# Patient Record
Sex: FEMALE | Race: WHITE
[De-identification: names, ages, dates, MRNs, and addresses within clinical notes are randomized per-mention and may not be internally consistent; named-entity substitution may affect disease eponyms.]

---

## 2020-03-26 ENCOUNTER — HOSPITAL ENCOUNTER (INPATIENT)
Dept: HOSPITAL 41 - JD.ED | Age: 80
LOS: 14 days | Discharge: NURSING, REHAB OR LONG-TERM CARE | DRG: 389 | End: 2020-04-09
Attending: INTERNAL MEDICINE | Admitting: INTERNAL MEDICINE
Payer: MEDICARE

## 2020-03-26 DIAGNOSIS — E66.9: ICD-10-CM

## 2020-03-26 DIAGNOSIS — Z79.899: ICD-10-CM

## 2020-03-26 DIAGNOSIS — B96.1: ICD-10-CM

## 2020-03-26 DIAGNOSIS — E86.0: ICD-10-CM

## 2020-03-26 DIAGNOSIS — N17.9: ICD-10-CM

## 2020-03-26 DIAGNOSIS — K56.609: Primary | ICD-10-CM

## 2020-03-26 DIAGNOSIS — Z79.01: ICD-10-CM

## 2020-03-26 DIAGNOSIS — Z86.718: ICD-10-CM

## 2020-03-26 DIAGNOSIS — E78.5: ICD-10-CM

## 2020-03-26 DIAGNOSIS — R31.9: ICD-10-CM

## 2020-03-26 DIAGNOSIS — K92.1: ICD-10-CM

## 2020-03-26 DIAGNOSIS — E87.2: ICD-10-CM

## 2020-03-26 DIAGNOSIS — R11.2: ICD-10-CM

## 2020-03-26 DIAGNOSIS — Z91.013: ICD-10-CM

## 2020-03-26 DIAGNOSIS — K59.09: ICD-10-CM

## 2020-03-26 DIAGNOSIS — E78.00: ICD-10-CM

## 2020-03-26 DIAGNOSIS — C56.9: ICD-10-CM

## 2020-03-26 DIAGNOSIS — K21.9: ICD-10-CM

## 2020-03-26 DIAGNOSIS — Z51.5: ICD-10-CM

## 2020-03-26 DIAGNOSIS — C18.9: ICD-10-CM

## 2020-03-26 DIAGNOSIS — C19: ICD-10-CM

## 2020-03-26 DIAGNOSIS — Z85.43: ICD-10-CM

## 2020-03-26 DIAGNOSIS — N39.0: ICD-10-CM

## 2020-03-26 DIAGNOSIS — Z90.722: ICD-10-CM

## 2020-03-26 DIAGNOSIS — H91.90: ICD-10-CM

## 2020-03-26 DIAGNOSIS — E86.9: ICD-10-CM

## 2020-03-26 PROCEDURE — 87186 SC STD MICRODIL/AGAR DIL: CPT

## 2020-03-26 PROCEDURE — 81001 URINALYSIS AUTO W/SCOPE: CPT

## 2020-03-26 PROCEDURE — 96365 THER/PROPH/DIAG IV INF INIT: CPT

## 2020-03-26 PROCEDURE — 85025 COMPLETE CBC W/AUTO DIFF WBC: CPT

## 2020-03-26 PROCEDURE — 87086 URINE CULTURE/COLONY COUNT: CPT

## 2020-03-26 PROCEDURE — 71046 X-RAY EXAM CHEST 2 VIEWS: CPT

## 2020-03-26 PROCEDURE — 99285 EMERGENCY DEPT VISIT HI MDM: CPT

## 2020-03-26 PROCEDURE — 86140 C-REACTIVE PROTEIN: CPT

## 2020-03-26 PROCEDURE — 84484 ASSAY OF TROPONIN QUANT: CPT

## 2020-03-26 PROCEDURE — 83605 ASSAY OF LACTIC ACID: CPT

## 2020-03-26 PROCEDURE — 87040 BLOOD CULTURE FOR BACTERIA: CPT

## 2020-03-26 PROCEDURE — 36415 COLL VENOUS BLD VENIPUNCTURE: CPT

## 2020-03-26 PROCEDURE — 87088 URINE BACTERIA CULTURE: CPT

## 2020-03-26 PROCEDURE — 80053 COMPREHEN METABOLIC PANEL: CPT

## 2020-03-26 PROCEDURE — 96375 TX/PRO/DX INJ NEW DRUG ADDON: CPT

## 2020-03-26 PROCEDURE — 96361 HYDRATE IV INFUSION ADD-ON: CPT

## 2020-03-26 PROCEDURE — 96376 TX/PRO/DX INJ SAME DRUG ADON: CPT

## 2020-03-26 PROCEDURE — 87077 CULTURE AEROBIC IDENTIFY: CPT

## 2020-03-26 RX ADMIN — SCOPALAMINE SCH MG: 1 PATCH, EXTENDED RELEASE TRANSDERMAL at 20:45

## 2020-03-26 NOTE — EDM.PDOC
ED HPI GENERAL MEDICAL PROBLEM





- General


Chief Complaint: Abdominal Pain


Stated Complaint: DEHYDRATED


Time Seen by Provider: 03/26/20 12:00


Source of Information: Reports: Patient


History Limitations: Reports: No Limitations





- History of Present Illness


INITIAL COMMENTS - FREE TEXT/NARRATIVE: 





Patient is an 79-year-old female who presents with her daughter with complaints 

of nausea and vomiting.  Patient was hospitalized and had surgery to remove her 

uterus and colon lesions at [facility] on 18 March.  Per patient's report, the 

surgery was unsuccessful.  She was discharged on 20 March.  Daughter states 

that she had nausea and vomiting for couple days after surgery.  She was okay 

for 1 day after getting home and then the nausea and vomiting returned on 

Sunday.  She has not been able to keep her medications, food, or liquids down.  

She often has nausea and vomiting with anesthesia, however this is lasting 

longer than it normally does.  She was discharged with Zofran which she took 

around 9:00 this morning.  She complains of increased weakness, dizziness, and 

lightheadedness.  She has had regular bowel movement since surgery.  She does 

also have blood in her stool, however she states this has been an ongoing issue 

for her due to her colorectal cancer.  She states that the blood in her stool 

has not worsened in any way.  She does have mild lower abdominal pain in the 

area of the incision. Denies any fever, chills, or dysuria.


  ** Middle Abdomen


Pain Score (Numeric/FACES): 4





- Related Data


 Allergies











Allergy/AdvReac Type Severity Reaction Status Date / Time


 


shellfish derived Allergy  Rash Verified 03/26/20 11:44











Home Meds: 


 Home Meds





Acetaminophen 1,000 mg PO Q6HR PRN 03/26/20 [History]


Apixaban [Eliquis] 5 mg PO BID 03/26/20 [History]


Multivitamin [Daily Bianca] 1 each PO DAILY 03/26/20 [History]


Omeprazole 20 mg PO DAILY PRN 03/26/20 [History]


Sennosides/Docusate Sodium [Sennosides-Docusate Sodium] 1 each PO BID PRN 03/26/ 20 [History]


Simethicone 80 mg PO QID PRN 03/26/20 [History]


Simvastatin 5 mg PO BEDTIME 03/26/20 [History]


traMADol [Ultram] 50 mg PO Q6H PRN 03/26/20 [History]











Past Medical History


HEENT History: Reports: Hard of Hearing


Cardiovascular History: Reports: Blood Clots/VTE/DVT, High Cholesterol


Respiratory History: Reports: None


Gastrointestinal History: Reports: Chronic Constipation, GERD


OB/GYN History: Reports: Pregnancy


Musculoskeletal History: Reports: None


Neurological History: Reports: None


Psychiatric History: Reports: None


Endocrine/Metabolic History: Reports: Obesity/BMI 30+


Hematologic History: Reports: Anticoagulation Therapy, Blood Transfusion(s)


Immunologic History: Reports: None


Oncologic (Cancer) History: Reports: Colon, Ovarian


Dermatologic History: Reports: None





- Infectious Disease History


Infectious Disease History: Reports: None





- Past Surgical History


GI Surgical History: Reports: Colonoscopy, Other (See Below)


Other GI Surgeries/Procedures: Exploratory Laparotomy


Female  Surgical History: Reports: Other (See Below)


Other Female  Surgeries/Procedures: Ovaries Removed in 2007


Oncologic Surgical History: Reports: Biopsy of Breast





Social & Family History





- Tobacco Use


Smoking Status *Q: Never Smoker





- Caffeine Use


Caffeine Use: Reports: Coffee





- Recreational Drug Use


Recreational Drug Use: No





ED ROS GENERAL





- Review of Systems


Review Of Systems: See Below


Constitutional: Reports: Weakness, Decreased Appetite.  Denies: Fever, Chills


HEENT: Reports: No Symptoms


Respiratory: Reports: No Symptoms.  Denies: Shortness of Breath, Cough


Cardiovascular: Reports: Lightheadedness.  Denies: Chest Pain, Dyspnea on 

Exertion, Syncope


Endocrine: Reports: No Symptoms


GI/Abdominal: Reports: Nausea, Vomiting.  Denies: Abdominal Pain, Diarrhea


: Reports: Hematuria.  Denies: Dysuria, Flank Pain, Frequency


Musculoskeletal: Reports: No Symptoms


Skin: Reports: No Symptoms


Neurological: Reports: No Symptoms


Psychiatric: Reports: No Symptoms


Hematologic/Lymphatic: Reports: No Symptoms


Immunologic: Reports: No Symptoms





ED EXAM, GI/ABD





- Physical Exam


Exam: See Below


Exam Limited By: No Limitations


General Appearance: Alert, WD/WN, No Apparent Distress


Respiratory/Chest: No Respiratory Distress, Lungs Clear, Normal Breath Sounds, 

No Accessory Muscle Use, Chest Non-Tender


Cardiovascular: Normal Peripheral Pulses, Regular Rate, Rhythm, No Edema, No 

Gallop, No JVD, No Murmur, No Rub


GI/Abdominal Exam: Normal Bowel Sounds, Soft, No Organomegaly, No Distention, 

No Abnormal Bruit, No Mass, Pelvis Stable, Tender (mild lower abdominal 

tenderness around incision site), Other (lower abdominal midline incision. Well 

approximated. No redness, swelling, or drainage present.)


Neurological: Alert, Oriented, CN II-XII Intact, Normal Cognition, Normal Gait, 

Normal Reflexes, No Motor/Sensory Deficits


Psychiatric: Normal Affect, Normal Mood


Skin Exam: Warm, Dry, Intact, Normal Color, No Rash





Course





- Vital Signs


Last Recorded V/S: 


 Last Vital Signs











Temp  97.9 F   03/26/20 11:39


 


Pulse  88   03/26/20 11:39


 


Resp  15   03/26/20 11:39


 


BP  163/82 H  03/26/20 11:39


 


Pulse Ox  97   03/26/20 11:39














- Orders/Labs/Meds


Orders: 


 Active Orders 24 hr











 Category Date Time Status


 


 Patient Status [ADT] Routine ADT  03/26/20 16:01 Ordered


 


 Peripheral IV Care [RC] .AS DIRECTED Care  03/26/20 12:08 Active


 


 CULTURE BLOOD [BC] Stat Lab  03/26/20 15:18 Received


 


 CULTURE BLOOD [BC] Stat Lab  03/26/20 15:35 Received


 


 CULTURE URINE [RM] Stat Lab  03/26/20 14:30 Received


 


 LACTIC ACID [CHEM] Stat Lab  03/26/20 15:18 Received


 


 Sodium Chloride 0.9% [Normal Saline] 1,000 ml Med  03/26/20 12:15 Active





 IV ASDIRECTED   


 


 Sodium Chloride 0.9% [Saline Flush] Med  03/26/20 12:08 Active





 10 ml FLUSH ASDIRECTED PRN   


 


 cefTRIAXone [Rocephin] 1 gm Med  03/26/20 14:45 Active





 Sodium Chloride 0.9% [Normal Saline] 100 ml   





 IV Q24H   


 


 Blood Culture x2 Reflex Set [OM.PC] Stat Oth  03/26/20 14:56 Ordered


 


 Peripheral IV Insertion Adult [OM.PC] Stat Oth  03/26/20 12:07 Ordered








 Medication Orders





Sodium Chloride (Normal Saline)  1,000 mls @ 999 mls/hr IV ASDIRECTED MILADYS


   Last Admin: 03/26/20 12:18  Dose: 150 mls/hr


Ceftriaxone Sodium 1 gm/ (Sodium Chloride)  100 mls @ 200 mls/hr IV Q24H MILADYS


   Last Admin: 03/26/20 15:42  Dose: 200 mls/hr


Sodium Chloride (Saline Flush)  10 ml FLUSH ASDIRECTED PRN


   PRN Reason: Keep Vein Open


   Last Admin: 03/26/20 12:18  Dose: 10 ml








Labs: 


 Laboratory Tests











  03/26/20 03/26/20 03/26/20 Range/Units





  12:30 12:30 12:30 


 


WBC  17.18 H    (3.98-10.04)  K/mm3


 


RBC  4.44    (3.98-5.22)  M/mm3


 


Hgb  13.4    (11.2-15.7)  gm/dl


 


Hct  42.6    (34.1-44.9)  %


 


MCV  95.9 H    (79.4-94.8)  fl


 


MCH  30.2    (25.6-32.2)  pg


 


MCHC  31.5 L    (32.2-35.5)  g/dl


 


RDW Std Deviation  47.1 H    (36.4-46.3)  fL


 


Plt Count  424 H    (182-369)  K/mm3


 


MPV  9.2 L    (9.4-12.3)  fl


 


Neut % (Auto)  78.4 H    (34.0-71.1)  %


 


Lymph % (Auto)  10.9 L    (19.3-51.7)  %


 


Mono % (Auto)  9.2    (4.7-12.5)  %


 


Eos % (Auto)  0.4 L    (0.7-5.8)  


 


Baso % (Auto)  0.2    (0.1-1.2)  %


 


Neut # (Auto)  13.48 H    (1.56-6.13)  K/mm3


 


Lymph # (Auto)  1.87    (1.18-3.74)  K/mm3


 


Mono # (Auto)  1.58 H    (0.24-0.36)  K/mm3


 


Eos # (Auto)  0.07    (0.04-0.36)  K/mm3


 


Baso # (Auto)  0.03    (0.01-0.08)  K/mm3


 


Manual Slide Review  Normal smear    


 


Sodium   142   (136-145)  mEq/L


 


Potassium   4.8   (3.5-5.1)  mEq/L


 


Chloride   104   ()  mEq/L


 


Carbon Dioxide   25   (21-32)  mEq/L


 


Anion Gap   17.8 H   (5-15)  


 


BUN   27 H   (7-18)  mg/dL


 


Creatinine   1.3 H   (0.55-1.02)  mg/dL


 


Est Cr Clr Drug Dosing   31.06   mL/min


 


Estimated GFR (MDRD)   39   (>60)  mL/min


 


BUN/Creatinine Ratio   20.8 H   (14-18)  


 


Glucose   103   ()  mg/dL


 


Calcium   9.0   (8.5-10.1)  mg/dL


 


Total Bilirubin   0.6   (0.2-1.0)  mg/dL


 


AST   28   (15-37)  U/L


 


ALT   36   (14-59)  U/L


 


Alkaline Phosphatase   87   ()  U/L


 


Troponin I    < 0.017  (0.00-0.056)  ng/mL


 


C-Reactive Protein   5.3 H*   (<1.0)  mg/dL


 


Total Protein   6.9   (6.4-8.2)  g/dl


 


Albumin   2.8 L   (3.4-5.0)  g/dl


 


Globulin   4.1   gm/dL


 


Albumin/Globulin Ratio   0.7 L   (1-2)  


 


Urine Color     (Yellow)  


 


Urine Appearance     (Clear)  


 


Urine pH     (5.0-8.0)  


 


Ur Specific Gravity     (1.005-1.030)  


 


Urine Protein     (Negative)  


 


Urine Glucose (UA)     (Negative)  


 


Urine Ketones     (Negative)  


 


Urine Occult Blood     (Negative)  


 


Urine Nitrite     (Negative)  


 


Urine Bilirubin     (Negative)  


 


Urine Urobilinogen     (0.2-1.0)  


 


Ur Leukocyte Esterase     (Negative)  


 


Urine RBC     (0-5)  /hpf


 


Urine WBC     (0-5)  /hpf


 


Ur Squamous Epith Cells     (0-5)  /hpf


 


Urine Bacteria     (FEW)  /hpf


 


Urine Mucus     (FEW)  /hpf














  03/26/20 Range/Units





  14:30 


 


WBC   (3.98-10.04)  K/mm3


 


RBC   (3.98-5.22)  M/mm3


 


Hgb   (11.2-15.7)  gm/dl


 


Hct   (34.1-44.9)  %


 


MCV   (79.4-94.8)  fl


 


MCH   (25.6-32.2)  pg


 


MCHC   (32.2-35.5)  g/dl


 


RDW Std Deviation   (36.4-46.3)  fL


 


Plt Count   (182-369)  K/mm3


 


MPV   (9.4-12.3)  fl


 


Neut % (Auto)   (34.0-71.1)  %


 


Lymph % (Auto)   (19.3-51.7)  %


 


Mono % (Auto)   (4.7-12.5)  %


 


Eos % (Auto)   (0.7-5.8)  


 


Baso % (Auto)   (0.1-1.2)  %


 


Neut # (Auto)   (1.56-6.13)  K/mm3


 


Lymph # (Auto)   (1.18-3.74)  K/mm3


 


Mono # (Auto)   (0.24-0.36)  K/mm3


 


Eos # (Auto)   (0.04-0.36)  K/mm3


 


Baso # (Auto)   (0.01-0.08)  K/mm3


 


Manual Slide Review   


 


Sodium   (136-145)  mEq/L


 


Potassium   (3.5-5.1)  mEq/L


 


Chloride   ()  mEq/L


 


Carbon Dioxide   (21-32)  mEq/L


 


Anion Gap   (5-15)  


 


BUN   (7-18)  mg/dL


 


Creatinine   (0.55-1.02)  mg/dL


 


Est Cr Clr Drug Dosing   mL/min


 


Estimated GFR (MDRD)   (>60)  mL/min


 


BUN/Creatinine Ratio   (14-18)  


 


Glucose   ()  mg/dL


 


Calcium   (8.5-10.1)  mg/dL


 


Total Bilirubin   (0.2-1.0)  mg/dL


 


AST   (15-37)  U/L


 


ALT   (14-59)  U/L


 


Alkaline Phosphatase   ()  U/L


 


Troponin I   (0.00-0.056)  ng/mL


 


C-Reactive Protein   (<1.0)  mg/dL


 


Total Protein   (6.4-8.2)  g/dl


 


Albumin   (3.4-5.0)  g/dl


 


Globulin   gm/dL


 


Albumin/Globulin Ratio   (1-2)  


 


Urine Color  Allison H  (Yellow)  


 


Urine Appearance  Cloudy H  (Clear)  


 


Urine pH  6.0  (5.0-8.0)  


 


Ur Specific Gravity  > or = 1.030  (1.005-1.030)  


 


Urine Protein  2+ H  (Negative)  


 


Urine Glucose (UA)  Negative  (Negative)  


 


Urine Ketones  2+ H  (Negative)  


 


Urine Occult Blood  3+ H  (Negative)  


 


Urine Nitrite  Positive H  (Negative)  


 


Urine Bilirubin  1+ H  (Negative)  


 


Urine Urobilinogen  1.0  (0.2-1.0)  


 


Ur Leukocyte Esterase  1+ H  (Negative)  


 


Urine RBC  40-50 H  (0-5)  /hpf


 


Urine WBC  >100 H  (0-5)  /hpf


 


Ur Squamous Epith Cells  5-10 H  (0-5)  /hpf


 


Urine Bacteria  Moderate H  (FEW)  /hpf


 


Urine Mucus  Not seen  (FEW)  /hpf











Meds: 


Medications











Generic Name Dose Route Start Last Admin





  Trade Name Virginie  PRN Reason Stop Dose Admin


 


Sodium Chloride  1,000 mls @ 999 mls/hr  03/26/20 12:15  03/26/20 12:18





  Normal Saline  IV   150 mls/hr





  ASDIRECTED MILADYS   Administration





     





     





     





     


 


Ceftriaxone Sodium 1 gm/  100 mls @ 200 mls/hr  03/26/20 14:45  03/26/20 15:42





  Sodium Chloride  IV   200 mls/hr





  Q24H MILADYS   Administration





     





     





     





     


 


Sodium Chloride  10 ml  03/26/20 12:08  03/26/20 12:18





  Saline Flush  FLUSH   10 ml





  ASDIRECTED PRN   Administration





  Keep Vein Open   





     





     





     














Discontinued Medications














Generic Name Dose Route Start Last Admin





  Trade Name Virginie  PRN Reason Stop Dose Admin


 


Al Hydroxide/Mg Hydroxide  30 ml  03/26/20 13:41  03/26/20 13:47





  Mag-Al Plus  PO  03/26/20 13:42  30 ml





  ONETIME ONE   Administration





     





     





     





     


 


Famotidine  20 mg  03/26/20 14:32  03/26/20 14:38





  Pepcid  IVPUSH  03/26/20 14:33  20 mg





  ONETIME ONE   Administration





     





     





     





     


 


Ondansetron HCl  4 mg  03/26/20 12:08  03/26/20 12:18





  Zofran  IVPUSH  03/26/20 12:09  4 mg





  ONETIME ONE   Administration





     





     





     





     


 


Ondansetron HCl  4 mg  03/26/20 14:30  03/26/20 14:38





  Zofran  IVPUSH  03/26/20 14:31  4 mg





  ONETIME ONE   Administration





     





     





     





     














- Re-Assessments/Exams


Free Text/Narrative Re-Assessment/Exam: 





03/26/20 1500


Hematology was significant for WBC elevated at 17.18, anion gap 17.8, BUN 27, 

creatinine 1.3, CRP 5.3.  Urinalysis was grossly positive for urinary tract 

infection.  I have ordered blood cultures and a lactic acid.  Once he is a 

complete we will give Rocephin 1 g IV.  I will call to speak with the 

hospitalist regarding admission.





03/26/20 16:03


Spoke with Dr. Griggs.  Patient will be admitted as inpatient for urinary tract 

infection.





Departure





- Departure


Time of Disposition: 16:03


Disposition: Admitted As Inpatient 65


Condition: Fair


Clinical Impression: 


Urinary tract infection


Qualifiers:


 Urinary tract infection type: site unspecified Hematuria presence: with 

hematuria Qualified Code(s): N39.0 - Urinary tract infection, site not specified

; R31.9 - Hematuria, unspecified








- Discharge Information


Referrals: 


Eliana Patel MD [Primary Care Provider] - 


Forms:  ED Department Discharge





Sepsis Event Note





- Evaluation


Sepsis Screening Result: No Definite Risk





- Focused Exam


Vital Signs: 


 Vital Signs











  Temp Pulse Resp BP Pulse Ox


 


 03/26/20 11:39  97.9 F  88  15  163/82 H  97











Date Exam was Performed: 03/26/20


Time Exam was Performed: 16:03





- My Orders


Last 24 Hours: 


My Active Orders





03/26/20 12:07


Peripheral IV Insertion Adult [OM.PC] Stat 





03/26/20 12:08


Peripheral IV Care [RC] .AS DIRECTED 


Sodium Chloride 0.9% [Saline Flush]   10 ml FLUSH ASDIRECTED PRN 





03/26/20 12:15


Sodium Chloride 0.9% [Normal Saline] 1,000 ml IV ASDIRECTED 





03/26/20 14:30


CULTURE URINE [RM] Stat 





03/26/20 14:45


cefTRIAXone [Rocephin] 1 gm   Sodium Chloride 0.9% [Normal Saline] 100 ml IV 

Q24H 





03/26/20 14:56


Blood Culture x2 Reflex Set [OM.PC] Stat 





03/26/20 15:18


CULTURE BLOOD [BC] Stat 


LACTIC ACID [CHEM] Stat 





03/26/20 15:35


CULTURE BLOOD [BC] Stat 





03/26/20 16:01


Patient Status [ADT] Routine 














- Assessment/Plan


Last 24 Hours: 


My Active Orders





03/26/20 12:07


Peripheral IV Insertion Adult [OM.PC] Stat 





03/26/20 12:08


Peripheral IV Care [RC] .AS DIRECTED 


Sodium Chloride 0.9% [Saline Flush]   10 ml FLUSH ASDIRECTED PRN 





03/26/20 12:15


Sodium Chloride 0.9% [Normal Saline] 1,000 ml IV ASDIRECTED 





03/26/20 14:30


CULTURE URINE [RM] Stat 





03/26/20 14:45


cefTRIAXone [Rocephin] 1 gm   Sodium Chloride 0.9% [Normal Saline] 100 ml IV 

Q24H 





03/26/20 14:56


Blood Culture x2 Reflex Set [OM.PC] Stat 





03/26/20 15:18


CULTURE BLOOD [BC] Stat 


LACTIC ACID [CHEM] Stat 





03/26/20 15:35


CULTURE BLOOD [BC] Stat 





03/26/20 16:01


Patient Status [ADT] Routine

## 2020-03-26 NOTE — CR
Chest: 2 views of the chest were obtained.

 

Comparison: No prior chest imaging.

 

Heart size and mediastinum are normal.  Lungs are clear with no acute 

parenchymal change.  Bony structures are grossly intact.

 

Impression:

1.  Nothing acute is suspected on 2 view chest x-ray.

 

Diagnostic code #1

 

This report was dictated in MDT

## 2020-03-26 NOTE — PCM.HP.2
H&P History of Present Illness





- General


Date of Service: 03/26/20


Admit Problem/Dx: 


 Admission Diagnosis/Problem





Admission Diagnosis/Problem      Urinary tract infection








Source of Information: Patient, Provider, RN, RN Notes Reviewed


History Limitations: Reports: No Limitations





- History of Present Illness


Initial Comments - Free Text/Narative: 


Ninfa Carrillo is a 78 yo female who presents to ED on 3/26/2020 with nausea and 

vomiting.  She states that she feels like she is dehydrated.  She recently 

returned from [facility] in which she had a unsuccessful surgery to remove her 

uterus and colon lesions.  This reportedly occurred on March 18 and she was 

discharged on March 20.  Patient's daughter who accompanies her states that she 

had nausea and vomiting for couple days after surgery.  She had one good day 

and then her symptoms returned this past Sunday.  Reports has not been able to 

keep any medications food or liquid down.  She has history of significant 

nausea and vomiting with anesthesia however this is been lasting much longer 

than normal.  She has a prescription for p.o. Zofran which she took this morning

, does not really helped.  She reports additional increased weakness, dizziness

, lightheadedness, and lower abdominal pain at her incision site.  She reports 

she is had regular bowel movement since her surgery and states she does have 

blood in her stool.  She reports this is been an ongoing issue with her 

colorectal cancer but has not worsened in any way.  She denies any fever, chills

, or urinary issues.





In the ED temp was 97.9.  Pulse 88.  Respirations 15.  Blood pressure 163/82.  

Pulse ox 97%.  Labs are obtained with an elevated white count of 17.18.  

Hemoglobin is good at 13.4.  Hematocrit 42.6.  Platelets are high at 424,000.  

She is macrocytic.  Neutrophils are elevated at 13.48.  Anion gap is noted to 

be high at 17.8.  Creatinine is 1.3.  GFR is 39.  BUN is 27.  Glucose is normal 

at 103.  Troponin is negative.  Electrolytes otherwise look normal.  CRP is 

elevated at 5.3.  Albumin is low at 2.8.  UA is obtained and is strongly 

positive and concentrated.  2+ protein, 2+ ketones, 3+ occult blood, positive 

nitrate, 1+ bilirubin, 1+ leukocyte esterase, 50 RBCs, greater than 100 WBCs, 5-

10 squamous epithelial cells, and moderate bacteria are noted.  She is given a 

1 L fluid bolus and started on 1 g of Rocephin.  She is also given 20 mg of 

Pepcid and IV push Zofran for nausea.  Lactic acid is normal at 1.1.  Chest x-

ray is obtained and shows nothing acute.  Blood cultures were obtained and are 

pending.  Urine culture is pending.





She carries a history of: prior VTE, HLD, chronic constipation, GERD, obesity, 

colon and ovarian cancer, S/P oophorectomy in 2007.  She was never smoker.  Her 

PCP is Dr. Patel.  She subsequently admitted to the medical floor for 

management of her intractable nausea and vomiting and UTI.





  ** Middle Abdomen


Pain Score (Numeric/FACES): 4





- Related Data


Allergies/Adverse Reactions: 


 Allergies











Allergy/AdvReac Type Severity Reaction Status Date / Time


 


shellfish derived Allergy  Rash Verified 03/26/20 17:00











Home Medications: 


 Home Meds





Acetaminophen 1,000 mg PO Q6HR PRN 03/26/20 [History]


Apixaban [Eliquis] 5 mg PO BID 03/26/20 [History]


Magnesium Hydroxide [Milk of Magnesia] 30 ml PO BID PRN 03/26/20 [History]


Multivitamin [Daily Bianca] 1 each PO DAILY 03/26/20 [History]


Omeprazole 20 mg PO DAILY PRN 03/26/20 [History]


Sennosides/Docusate Sodium [Sennosides-Docusate Sodium] 1 each PO BID PRN 03/26/ 20 [History]


Simethicone 80 mg PO QID PRN 03/26/20 [History]


Simvastatin 5 mg PO BEDTIME 03/26/20 [History]


traMADol [Ultram] 50 mg PO Q6H PRN 03/26/20 [History]











Past Medical History


HEENT History: Reports: Hard of Hearing


Cardiovascular History: Reports: Blood Clots/VTE/DVT, High Cholesterol


Respiratory History: Reports: None


Gastrointestinal History: Reports: Chronic Constipation, GERD


OB/GYN History: Reports: Pregnancy


Musculoskeletal History: Reports: None


Neurological History: Reports: None


Psychiatric History: Reports: None


Endocrine/Metabolic History: Reports: Obesity/BMI 30+


Hematologic History: Reports: Anticoagulation Therapy, Blood Transfusion(s)


Immunologic History: Reports: None


Oncologic (Cancer) History: Reports: Colon, Ovarian


Dermatologic History: Reports: None





- Infectious Disease History


Infectious Disease History: Reports: None





- Past Surgical History


GI Surgical History: Reports: Colonoscopy, Other (See Below)


Other GI Surgeries/Procedures: Exploratory Laparotomy


Female  Surgical History: Reports: Other (See Below)


Other Female  Surgeries/Procedures: Ovaries Removed in 2007


Oncologic Surgical History: Reports: Biopsy of Breast





Social & Family History





- Tobacco Use


Smoking Status *Q: Never Smoker





- Caffeine Use


Caffeine Use: Reports: Coffee





- Recreational Drug Use


Recreational Drug Use: No





H&P Review of Systems





- Review of Systems:


Review Of Systems: See Below


General: Reports: Malaise, Weakness, Fatigue, Decreased Appetite.  Denies: Fever

, Chills


HEENT: Reports: No Symptoms.  Denies: Headaches, Sore Throat


Pulmonary: Reports: No Symptoms.  Denies: Shortness of Breath, Wheezing, 

Pleuritic Chest Pain, Cough, Sputum


Cardiovascular: Reports: No Symptoms.  Denies: Chest Pain, Palpitations, 

Dyspnea on Exertion, Syncope


Gastrointestinal: Reports: Bloody Stool (chronic 2/2 colon cancer ), Nausea, 

Vomiting.  Denies: Abdominal Pain, Constipation, Diarrhea


Genitourinary: Reports: Hematuria.  Denies: Frequency, Pain, Discharge


Musculoskeletal: Reports: No Symptoms


Skin: Reports: No Symptoms.  Denies: Cyanosis


Psychiatric: Reports: No Symptoms.  Denies: Confusion


Neurological: Reports: No Symptoms.  Denies: Pre-Existing Deficit, Trouble 

Speaking, Difficulty Walking, Gait Disturbance


Hematologic/Lymphatic: Reports: No Symptoms


Immunologic: Reports: No Symptoms





Exam





- Exam


Exam: See Below





- Vital Signs


Vital Signs: 


 Last Vital Signs











Temp  97.9 F   03/26/20 11:39


 


Pulse  88   03/26/20 11:39


 


Resp  15   03/26/20 11:39


 


BP  163/82 H  03/26/20 11:39


 


Pulse Ox  97   03/26/20 11:39











Weight: 232 lb





- Exam


Quality Assessment: DVT Prophylaxis


General: Alert, Oriented, Cooperative, Mild Distress (looks uncomfortable )


HEENT: Conjunctiva Clear, EACs Clear, Hearing Intact, Mucosa Moist & Pink, 

Nares Patent, Posterior Pharynx Clear, PERRLA


Neck: Supple, Trachea Midline


Lungs: Clear to Auscultation, Normal Respiratory Effort


Cardiovascular: Regular Rate, Regular Rhythm


GI/Abdominal Exam: Normal Bowel Sounds, Soft, No Distention, Tender (around 

incision site )


 (Female) Exam: Deferred


Rectal (Female) Exam: Deferred


Back Exam: Normal Inspection, Full Range of Motion


Extremities: Normal Inspection, Normal Range of Motion, Non-Tender, No Pedal 

Edema, Normal Capillary Refill


Skin: Warm, Dry, Intact, Incision (midline lower abdominen - no signs of 

infection)


Neurological: Cranial Nerves Intact (grossly )


Neuro Extensive - Mental Status: Alert, Oriented x3





- Patient Data


Lab Results Last 24 hrs: 


 Laboratory Results - last 24 hr











  03/26/20 03/26/20 03/26/20 Range/Units





  12:30 12:30 12:30 


 


WBC  17.18 H    (3.98-10.04)  K/mm3


 


RBC  4.44    (3.98-5.22)  M/mm3


 


Hgb  13.4    (11.2-15.7)  gm/dl


 


Hct  42.6    (34.1-44.9)  %


 


MCV  95.9 H    (79.4-94.8)  fl


 


MCH  30.2    (25.6-32.2)  pg


 


MCHC  31.5 L    (32.2-35.5)  g/dl


 


RDW Std Deviation  47.1 H    (36.4-46.3)  fL


 


Plt Count  424 H    (182-369)  K/mm3


 


MPV  9.2 L    (9.4-12.3)  fl


 


Neut % (Auto)  78.4 H    (34.0-71.1)  %


 


Lymph % (Auto)  10.9 L    (19.3-51.7)  %


 


Mono % (Auto)  9.2    (4.7-12.5)  %


 


Eos % (Auto)  0.4 L    (0.7-5.8)  


 


Baso % (Auto)  0.2    (0.1-1.2)  %


 


Neut # (Auto)  13.48 H    (1.56-6.13)  K/mm3


 


Lymph # (Auto)  1.87    (1.18-3.74)  K/mm3


 


Mono # (Auto)  1.58 H    (0.24-0.36)  K/mm3


 


Eos # (Auto)  0.07    (0.04-0.36)  K/mm3


 


Baso # (Auto)  0.03    (0.01-0.08)  K/mm3


 


Manual Slide Review  Normal smear    


 


Sodium   142   (136-145)  mEq/L


 


Potassium   4.8   (3.5-5.1)  mEq/L


 


Chloride   104   ()  mEq/L


 


Carbon Dioxide   25   (21-32)  mEq/L


 


Anion Gap   17.8 H   (5-15)  


 


BUN   27 H   (7-18)  mg/dL


 


Creatinine   1.3 H   (0.55-1.02)  mg/dL


 


Est Cr Clr Drug Dosing   31.06   mL/min


 


Estimated GFR (MDRD)   39   (>60)  mL/min


 


BUN/Creatinine Ratio   20.8 H   (14-18)  


 


Glucose   103   ()  mg/dL


 


Lactic Acid     (0.4-2.0)  mmol/L


 


Calcium   9.0   (8.5-10.1)  mg/dL


 


Total Bilirubin   0.6   (0.2-1.0)  mg/dL


 


AST   28   (15-37)  U/L


 


ALT   36   (14-59)  U/L


 


Alkaline Phosphatase   87   ()  U/L


 


Troponin I    < 0.017  (0.00-0.056)  ng/mL


 


C-Reactive Protein   5.3 H*   (<1.0)  mg/dL


 


Total Protein   6.9   (6.4-8.2)  g/dl


 


Albumin   2.8 L   (3.4-5.0)  g/dl


 


Globulin   4.1   gm/dL


 


Albumin/Globulin Ratio   0.7 L   (1-2)  


 


Urine Color     (Yellow)  


 


Urine Appearance     (Clear)  


 


Urine pH     (5.0-8.0)  


 


Ur Specific Gravity     (1.005-1.030)  


 


Urine Protein     (Negative)  


 


Urine Glucose (UA)     (Negative)  


 


Urine Ketones     (Negative)  


 


Urine Occult Blood     (Negative)  


 


Urine Nitrite     (Negative)  


 


Urine Bilirubin     (Negative)  


 


Urine Urobilinogen     (0.2-1.0)  


 


Ur Leukocyte Esterase     (Negative)  


 


Urine RBC     (0-5)  /hpf


 


Urine WBC     (0-5)  /hpf


 


Ur Squamous Epith Cells     (0-5)  /hpf


 


Urine Bacteria     (FEW)  /hpf


 


Urine Mucus     (FEW)  /hpf














  03/26/20 03/26/20 Range/Units





  14:30 15:18 


 


WBC    (3.98-10.04)  K/mm3


 


RBC    (3.98-5.22)  M/mm3


 


Hgb    (11.2-15.7)  gm/dl


 


Hct    (34.1-44.9)  %


 


MCV    (79.4-94.8)  fl


 


MCH    (25.6-32.2)  pg


 


MCHC    (32.2-35.5)  g/dl


 


RDW Std Deviation    (36.4-46.3)  fL


 


Plt Count    (182-369)  K/mm3


 


MPV    (9.4-12.3)  fl


 


Neut % (Auto)    (34.0-71.1)  %


 


Lymph % (Auto)    (19.3-51.7)  %


 


Mono % (Auto)    (4.7-12.5)  %


 


Eos % (Auto)    (0.7-5.8)  


 


Baso % (Auto)    (0.1-1.2)  %


 


Neut # (Auto)    (1.56-6.13)  K/mm3


 


Lymph # (Auto)    (1.18-3.74)  K/mm3


 


Mono # (Auto)    (0.24-0.36)  K/mm3


 


Eos # (Auto)    (0.04-0.36)  K/mm3


 


Baso # (Auto)    (0.01-0.08)  K/mm3


 


Manual Slide Review    


 


Sodium    (136-145)  mEq/L


 


Potassium    (3.5-5.1)  mEq/L


 


Chloride    ()  mEq/L


 


Carbon Dioxide    (21-32)  mEq/L


 


Anion Gap    (5-15)  


 


BUN    (7-18)  mg/dL


 


Creatinine    (0.55-1.02)  mg/dL


 


Est Cr Clr Drug Dosing    mL/min


 


Estimated GFR (MDRD)    (>60)  mL/min


 


BUN/Creatinine Ratio    (14-18)  


 


Glucose    ()  mg/dL


 


Lactic Acid   1.1  (0.4-2.0)  mmol/L


 


Calcium    (8.5-10.1)  mg/dL


 


Total Bilirubin    (0.2-1.0)  mg/dL


 


AST    (15-37)  U/L


 


ALT    (14-59)  U/L


 


Alkaline Phosphatase    ()  U/L


 


Troponin I    (0.00-0.056)  ng/mL


 


C-Reactive Protein    (<1.0)  mg/dL


 


Total Protein    (6.4-8.2)  g/dl


 


Albumin    (3.4-5.0)  g/dl


 


Globulin    gm/dL


 


Albumin/Globulin Ratio    (1-2)  


 


Urine Color  Allison H   (Yellow)  


 


Urine Appearance  Cloudy H   (Clear)  


 


Urine pH  6.0   (5.0-8.0)  


 


Ur Specific Gravity  > or = 1.030   (1.005-1.030)  


 


Urine Protein  2+ H   (Negative)  


 


Urine Glucose (UA)  Negative   (Negative)  


 


Urine Ketones  2+ H   (Negative)  


 


Urine Occult Blood  3+ H   (Negative)  


 


Urine Nitrite  Positive H   (Negative)  


 


Urine Bilirubin  1+ H   (Negative)  


 


Urine Urobilinogen  1.0   (0.2-1.0)  


 


Ur Leukocyte Esterase  1+ H   (Negative)  


 


Urine RBC  40-50 H   (0-5)  /hpf


 


Urine WBC  >100 H   (0-5)  /hpf


 


Ur Squamous Epith Cells  5-10 H   (0-5)  /hpf


 


Urine Bacteria  Moderate H   (FEW)  /hpf


 


Urine Mucus  Not seen   (FEW)  /hpf











Result Diagrams: 


 03/26/20 12:30





 03/26/20 12:30





Sepsis Event Note





- Evaluation


Sepsis Screening Result: No Definite Risk





- Focused Exam


Vital Signs: 


 Vital Signs











  Temp Pulse Resp BP Pulse Ox


 


 03/26/20 11:39  97.9 F  88  15  163/82 H  97











Date Exam was Performed: 03/26/20


Time Exam was Performed: 18:34





- Problem List


(1) Nausea and vomiting


SNOMED Code(s): 44982618


   ICD Code: R11.2 - NAUSEA WITH VOMITING, UNSPECIFIED   Status: Acute   

Priority: High   Current Visit: Yes   


Qualifiers: 


   Vomiting type: unspecified   Vomiting Intractability: intractable   

Qualified Code(s): R11.2 - Nausea with vomiting, unspecified   





(2) History of ovarian cancer


Status: Chronic   Priority: Medium   Current Visit: No   





(3) Colon cancer


SNOMED Code(s): 197547081


   ICD Code: C18.9 - MALIGNANT NEOPLASM OF COLON, UNSPECIFIED   Status: Acute   

Priority: High   Current Visit: Yes   


Qualifiers: 


   Colon location: unspecified part of colon   Qualified Code(s): C18.9 - 

Malignant neoplasm of colon, unspecified   





(4) Leukocytosis


SNOMED Code(s): 878391933, 818030347


   ICD Code: D72.829 - ELEVATED WHITE BLOOD CELL COUNT, UNSPECIFIED   Status: 

Acute   Priority: High   Current Visit: Yes   


Qualifiers: 


   Leukocytosis type: unspecified   Qualified Code(s): D72.829 - Elevated white 

blood cell count, unspecified   





(5) Acute renal injury


SNOMED Code(s): 11538608, 97520676


   ICD Code: N17.9 - ACUTE KIDNEY FAILURE, UNSPECIFIED   Status: Acute   

Priority: High   Current Visit: Yes   





(6) High anion gap metabolic acidosis


SNOMED Code(s): 06886853


   ICD Code: E87.2 - ACIDOSIS   Status: Acute   Priority: High   Current Visit: 

Yes   





(7) History of venous thromboembolism


SNOMED Code(s): 456712061


   ICD Code: Z86.718 - PERSONAL HISTORY OF OTHER VENOUS THROMBOSIS AND EMBOLISM

   Status: Chronic   Priority: Medium   Current Visit: Yes   





(8) Chronic anticoagulation


SNOMED Code(s): 446434748


   ICD Code: Z79.01 - LONG TERM (CURRENT) USE OF ANTICOAGULANTS   Status: 

Chronic   Priority: Low   Current Visit: Yes   





(9) HLD (hyperlipidemia)


SNOMED Code(s): 16991636


   ICD Code: E78.5 - HYPERLIPIDEMIA, UNSPECIFIED   Status: Chronic   Priority: 

Low   Current Visit: No   


Qualifiers: 


   Hyperlipidemia type: unspecified   Qualified Code(s): E78.5 - Hyperlipidemia

, unspecified   





(10) GERD (gastroesophageal reflux disease)


SNOMED Code(s): 488028765


   ICD Code: K21.9 - GASTRO-ESOPHAGEAL REFLUX DISEASE WITHOUT ESOPHAGITIS   

Status: Chronic   Priority: Medium   Current Visit: No   


Qualifiers: 


   Esophagitis presence: esophagitis presence not specified   Qualified Code(s)

: K21.9 - Gastro-esophageal reflux disease without esophagitis   





(11) Obesity


SNOMED Code(s): 738439500, 331482679


   ICD Code: E66.9 - OBESITY, UNSPECIFIED   Status: Chronic   Priority: Medium 

  Current Visit: No   


Qualifiers: 


   Obesity type: unspecified obesity type   Obesity classification: adult class 

2 (BMI 35 - 39.9)   Body mass index: BMI 38.0-38.9 





(12) Hematochezia


SNOMED Code(s): 153613223


   ICD Code: K92.1 - MELENA   Status: Chronic   Priority: Medium   Current Visit

: Yes   





(13) Urinary tract infection


SNOMED Code(s): 01056410


   ICD Code: N39.0 - URINARY TRACT INFECTION, SITE NOT SPECIFIED   Status: 

Acute   Priority: High   Current Visit: Yes   


Qualifiers: 


   Urinary tract infection type: site unspecified   Hematuria presence: with 

hematuria   Qualified Code(s): N39.0 - Urinary tract infection, site not 

specified; R31.9 - Hematuria, unspecified   





(14) S/P laparotomy


SNOMED Code(s): 233932752, 957541617, 483202155


   ICD Code: Z98.890 - OTHER SPECIFIED POSTPROCEDURAL STATES   Status: Acute   

Priority: Medium   Current Visit: Yes   





(15) Volume depletion


SNOMED Code(s): 67142263


   ICD Code: E86.9 - VOLUME DEPLETION, UNSPECIFIED   Status: Acute   Current 

Visit: Yes   


Problem List Initiated/Reviewed/Updated: Yes


Orders Last 24hrs: 


 Active Orders 24 hr











 Category Date Time Status


 


 Patient Status [ADT] Routine ADT  03/26/20 16:01 Active


 


 Height and Weight [RC] DAILY Care  03/26/20 16:14 Active


 


 Intake and Output [RC] QSHIFT Care  03/26/20 16:14 Active


 


 Oxygen Therapy [RC] PRN Care  03/26/20 16:14 Active


 


 Peripheral IV Care [RC] .AS DIRECTED Care  03/26/20 12:08 Active


 


 Pulse Oximetry [RC] PRN Care  03/26/20 16:14 Active


 


 Up With Assistance [RC] ASDIRECTED Care  03/26/20 16:14 Active


 


 VTE/DVT Education [RC] PER UNIT ROUTINE Care  03/26/20 16:14 Active


 


 Vital Signs [RC] Q4H Care  03/26/20 16:14 Active


 


 Consult to Case Management/ [CONS] Cons  03/26/20 16:14 Active





 Routine   


 


 Consult to Dietician [CONS] Routine Cons  03/26/20 16:14 Active


 


 Consult to Spiritual Care [CONS] Routine Cons  03/26/20 16:14 Active


 


 OT Evaluation and Treatment [CONS] Routine Cons  03/26/20 16:14 Active


 


 PT Evaluation and Treatment [CONS] Routine Cons  03/26/20 16:14 Active


 


 Heart Healthy Diet [DIET] Diet  03/26/20 Lunch Active


 


 BASIC METABOLIC PANEL,BMP [CHEM] AM Lab  03/27/20 05:11 Ordered


 


 BASIC METABOLIC PANEL,BMP [CHEM] AM Lab  03/28/20 05:11 Ordered


 


 BASIC METABOLIC PANEL,BMP [CHEM] AM Lab  03/29/20 05:11 Ordered


 


 BASIC METABOLIC PANEL,BMP [CHEM] AM Lab  03/30/20 05:11 Ordered


 


 CBC WITH AUTO DIFF [HEME] AM Lab  03/27/20 05:11 Ordered


 


 CBC WITH AUTO DIFF [HEME] AM Lab  03/28/20 05:11 Ordered


 


 CBC WITH AUTO DIFF [HEME] AM Lab  03/29/20 05:11 Ordered


 


 CBC WITH AUTO DIFF [HEME] AM Lab  03/30/20 05:11 Ordered


 


 CULTURE BLOOD [BC] Stat Lab  03/26/20 15:18 Received


 


 CULTURE BLOOD [BC] Stat Lab  03/26/20 15:35 Received


 


 CULTURE URINE [RM] Stat Lab  03/26/20 14:30 Received


 


 MAGNESIUM [CHEM] AM Lab  03/27/20 05:11 Ordered


 


 MAGNESIUM [CHEM] AM Lab  03/28/20 05:11 Ordered


 


 MAGNESIUM [CHEM] AM Lab  03/29/20 05:11 Ordered


 


 MAGNESIUM [CHEM] AM Lab  03/30/20 05:11 Ordered


 


 Acetaminophen [Tylenol] Med  03/26/20 16:14 Active





 650 mg PO Q4H PRN   


 


 Apixaban [Eliquis] Med  03/26/20 21:00 Ordered





 5 mg PO BID   


 


 Docusate Sodium/Sennosides [Senna Plus] Med  03/26/20 16:20 Active





 1 tab PO BID PRN   


 


 Multivitamin Med  03/27/20 09:00 Ordered





 1 each PO DAILY   


 


 Ondansetron [Zofran] Med  03/26/20 16:14 Active





 4 mg IV Q6H PRN   


 


 Simethicone Med  03/26/20 16:20 Ordered





 80 mg PO QID PRN   


 


 Sodium Chloride 0.9% [Normal Saline] 1,000 ml Med  03/26/20 12:15 Active





 IV ASDIRECTED   


 


 Sodium Chloride 0.9% [Normal Saline] 1,000 ml Med  03/26/20 16:30 Active





 IV ASDIRECTED   


 


 Sodium Chloride 0.9% [Saline Flush] Med  03/26/20 12:08 Active





 10 ml FLUSH ASDIRECTED PRN   


 


 cefTRIAXone [Rocephin] 1 gm Med  03/26/20 14:45 Active





 Sodium Chloride 0.9% [Normal Saline] 100 ml   





 IV Q24H   


 


 traMADol [Ultram] Med  03/26/20 16:20 Ordered





 50 mg PO Q6H PRN   


 


 Blood Culture x2 Reflex Set [OM.PC] Stat Oth  03/26/20 14:56 Ordered


 


 Peripheral IV Insertion Adult [OM.PC] Stat Oth  03/26/20 12:07 Ordered








 Medication Orders





Acetaminophen (Tylenol)  650 mg PO Q4H PRN


   PRN Reason: Pain (Mild 1-3)/fever


Apixaban (Eliquis)  5 mg PO BID [facility]


Sodium Chloride (Normal Saline)  1,000 mls @ 999 mls/hr IV ASDIRECTED MILADYS


   Last Admin: 03/26/20 12:18  Dose: 150 mls/hr


Ceftriaxone Sodium 1 gm/ (Sodium Chloride)  100 mls @ 200 mls/hr IV Q24H [facility]


   Last Admin: 03/26/20 15:42  Dose: 200 mls/hr


Sodium Chloride (Normal Saline)  1,000 mls @ 75 mls/hr IV ASDIRECTED [facility]


   Stop: 03/27/20 05:49


Non-Formulary Medication (Multivitamin)  1 each PO DAILY [facility]


Ondansetron HCl (Zofran)  4 mg IV Q6H PRN


   PRN Reason: Nausea/Vomiting


Senna/Docusate Sodium (Senna Plus)  1 tab PO BID PRN


   PRN Reason: Constipation


Simethicone (Simethicone)  80 mg PO QID PRN


   PRN Reason: flatulence


Sodium Chloride (Saline Flush)  10 ml FLUSH ASDIRECTED PRN


   PRN Reason: Keep Vein Open


   Last Admin: 03/26/20 12:18  Dose: 10 ml


Tramadol HCl (Ultram)  50 mg PO Q6H PRN


   PRN Reason: Pain








Assessment/Plan Comment:: 


I/P:





Acute:





Urinary tract infection


   -Reports hematuria


   -Recently underwent surgery at [facility] - martinez catheter placed during 

procedure


   -Denies any fever


   -WBC 17.18


   -CRP 5.3


   -UA: Cloudy, concentrated, 2+ protein, 2+ ketones, 3+ occult blood, positive 

nitrite, 1+ bilirubin, 1+ leukocyte esterase, 40-50 RBC, greater than 100 WBCs, 

moderate bacteria.


   -Urine culture pending 


   -Blood cultures pending


   -Sepsis criteria:


      -Apparent bacterial infection, No fever, WBC 17.18, No tachypnea, No 

tachycardia, Lactic acid 1.1


      -Does not meet sepsis criteria at this point


   -Started on Rocephin 1gm in ED - continue


   -IV fluids as indicated





Nausea and vomiting


   -Reports history of significant post-operative nausea and vomiting


   -Has had symptoms since return from surgery


   -Given IV fluids and zofran after discharge with minimal brief improvement 

of symptoms


   -Zofran in ED with minimal improvement


   -Start Phenergan PRN


   -Scopolamine patch 


   -Monitor electrolytes - good so far


   -Last BM 3/25/20


   


S/P laparotomy 2/2 planned colon resection


   -Performed at [facility]


   -Daughter reports procedure aborted once she was opened due to significance 

of neoplasm


      -Planning chemotherapy in [place]


   -No signs of infection of surgical site


   -Continue to monitor 





Acute kidney injury


   -Likely 2/2 dehydration from vomiting


   -BUN 27


   -Creatinine 1.3


   -eGFR 39


   -IV bolus given in ED


   -IV fluids as ordered





Volume depletion


   -TRINO as above


   -Concentrated urine


   -Anion gap 17.8


   -IV fluids as indicated





Chronic:


prior VTE


HLD


chronic constipation


GERD


obesity


colon 


Hx/o ovarian cancer


S/P oophorectomy in 2007





Plan:


Admit to medical floor


Routine AM labs


Other orders as indicated above


Review home medications 


PT/OT


CM/SW for discharge planning


Dietician consult


DVT prophylaxis: Home Eliquis


Code status: Full Code


PCP: Dr. Patel








- Mortality Measure


Prognosis:: Poor (Overall poor prognosis given colon cancer; Good prognosis for 

UTI/Nausea episode)

## 2020-03-27 RX ADMIN — THERA TABS SCH: TAB at 08:03

## 2020-03-27 NOTE — PCM.PN
- General Info


Date of Service: 03/27/20


Admission Dx/Problem (Free Text): 


 Admission Diagnosis/Problem





Admission Diagnosis/Problem      Urinary tract infection








Subjective Update: 


Tatyana is doing well. Her nausea has resolved. She has been up ambulating. No 

patient concerns. 





Functional Status: Reports: Pain Controlled, Tolerating Diet, Ambulating, 

Urinating.  Denies: New Symptoms





- Review of Systems


General: Reports: No Symptoms.  Denies: Fever, Weakness, Fatigue, Malaise


HEENT: Reports: No Symptoms.  Denies: Headaches, Sore Throat


Pulmonary: Reports: No Symptoms.  Denies: Shortness of Breath, Cough, Sputum


Cardiovascular: Reports: No Symptoms.  Denies: Chest Pain, Palpitations, Edema


Gastrointestinal: Reports: No Symptoms.  Denies: Abdominal Pain, Constipation, 

Diarrhea, Nausea, Vomiting


Genitourinary: Reports: No Symptoms.  Denies: Pain


Musculoskeletal: Reports: No Symptoms


Skin: Reports: No Symptoms.  Denies: Cyanosis


Neurological: Reports: No Symptoms.  Denies: Confusion, Difficulty Walking, 

Gait Disturbance


Psychiatric: Reports: No Symptoms





- Patient Data


Vitals - Most Recent: 


 Last Vital Signs











Temp  98.2 F   03/27/20 05:24


 


Pulse  81   03/27/20 05:24


 


Resp  20   03/27/20 05:24


 


BP  119/63   03/27/20 05:24


 


Pulse Ox  94 L  03/27/20 05:24











Weight - Most Recent: 232 lb 11.2 oz


I&O - Last 24 Hours: 


 Intake & Output











 03/26/20 03/27/20 03/27/20





 22:59 06:59 14:59


 


Intake Total  1200 


 


Output Total 600 450 


 


Balance -600 750 











Lab Results Last 24 Hours: 


 Laboratory Results - last 24 hr











  03/26/20 03/26/20 03/26/20 Range/Units





  12:30 12:30 12:30 


 


WBC  17.18 H    (3.98-10.04)  K/mm3


 


RBC  4.44    (3.98-5.22)  M/mm3


 


Hgb  13.4    (11.2-15.7)  gm/dl


 


Hct  42.6    (34.1-44.9)  %


 


MCV  95.9 H    (79.4-94.8)  fl


 


MCH  30.2    (25.6-32.2)  pg


 


MCHC  31.5 L    (32.2-35.5)  g/dl


 


RDW Std Deviation  47.1 H    (36.4-46.3)  fL


 


Plt Count  424 H    (182-369)  K/mm3


 


MPV  9.2 L    (9.4-12.3)  fl


 


Neut % (Auto)  78.4 H    (34.0-71.1)  %


 


Lymph % (Auto)  10.9 L    (19.3-51.7)  %


 


Mono % (Auto)  9.2    (4.7-12.5)  %


 


Eos % (Auto)  0.4 L    (0.7-5.8)  


 


Baso % (Auto)  0.2    (0.1-1.2)  %


 


Neut # (Auto)  13.48 H    (1.56-6.13)  K/mm3


 


Lymph # (Auto)  1.87    (1.18-3.74)  K/mm3


 


Mono # (Auto)  1.58 H    (0.24-0.36)  K/mm3


 


Eos # (Auto)  0.07    (0.04-0.36)  K/mm3


 


Baso # (Auto)  0.03    (0.01-0.08)  K/mm3


 


Manual Slide Review  Normal smear    


 


Sodium   142   (136-145)  mEq/L


 


Potassium   4.8   (3.5-5.1)  mEq/L


 


Chloride   104   ()  mEq/L


 


Carbon Dioxide   25   (21-32)  mEq/L


 


Anion Gap   17.8 H   (5-15)  


 


BUN   27 H   (7-18)  mg/dL


 


Creatinine   1.3 H   (0.55-1.02)  mg/dL


 


Est Cr Clr Drug Dosing   31.06   mL/min


 


Estimated GFR (MDRD)   39   (>60)  mL/min


 


BUN/Creatinine Ratio   20.8 H   (14-18)  


 


Glucose   103   ()  mg/dL


 


Lactic Acid     (0.4-2.0)  mmol/L


 


Calcium   9.0   (8.5-10.1)  mg/dL


 


Magnesium     (1.8-2.4)  mg/dl


 


Total Bilirubin   0.6   (0.2-1.0)  mg/dL


 


AST   28   (15-37)  U/L


 


ALT   36   (14-59)  U/L


 


Alkaline Phosphatase   87   ()  U/L


 


Troponin I    < 0.017  (0.00-0.056)  ng/mL


 


C-Reactive Protein   5.3 H*   (<1.0)  mg/dL


 


Total Protein   6.9   (6.4-8.2)  g/dl


 


Albumin   2.8 L   (3.4-5.0)  g/dl


 


Globulin   4.1   gm/dL


 


Albumin/Globulin Ratio   0.7 L   (1-2)  


 


Urine Color     (Yellow)  


 


Urine Appearance     (Clear)  


 


Urine pH     (5.0-8.0)  


 


Ur Specific Gravity     (1.005-1.030)  


 


Urine Protein     (Negative)  


 


Urine Glucose (UA)     (Negative)  


 


Urine Ketones     (Negative)  


 


Urine Occult Blood     (Negative)  


 


Urine Nitrite     (Negative)  


 


Urine Bilirubin     (Negative)  


 


Urine Urobilinogen     (0.2-1.0)  


 


Ur Leukocyte Esterase     (Negative)  


 


Urine RBC     (0-5)  /hpf


 


Urine WBC     (0-5)  /hpf


 


Ur Squamous Epith Cells     (0-5)  /hpf


 


Urine Bacteria     (FEW)  /hpf


 


Urine Mucus     (FEW)  /hpf














  03/26/20 03/26/20 03/27/20 Range/Units





  14:30 15:18 04:38 


 


WBC    13.51 H  (3.98-10.04)  K/mm3


 


RBC    3.93 L  (3.98-5.22)  M/mm3


 


Hgb    11.7  D  (11.2-15.7)  gm/dl


 


Hct    38.3  (34.1-44.9)  %


 


MCV    97.5 H  (79.4-94.8)  fl


 


MCH    29.8  (25.6-32.2)  pg


 


MCHC    30.5 L  (32.2-35.5)  g/dl


 


RDW Std Deviation    47.8 H  (36.4-46.3)  fL


 


Plt Count    412 H  (182-369)  K/mm3


 


MPV    9.9  (9.4-12.3)  fl


 


Neut % (Auto)    69.1  (34.0-71.1)  %


 


Lymph % (Auto)    15.8 L  (19.3-51.7)  %


 


Mono % (Auto)    12.3  (4.7-12.5)  %


 


Eos % (Auto)    1.4  (0.7-5.8)  


 


Baso % (Auto)    0.4  (0.1-1.2)  %


 


Neut # (Auto)    9.35 H  (1.56-6.13)  K/mm3


 


Lymph # (Auto)    2.13  (1.18-3.74)  K/mm3


 


Mono # (Auto)    1.66 H  (0.24-0.36)  K/mm3


 


Eos # (Auto)    0.19  (0.04-0.36)  K/mm3


 


Baso # (Auto)    0.05  (0.01-0.08)  K/mm3


 


Manual Slide Review    Normal smear  


 


Sodium     (136-145)  mEq/L


 


Potassium     (3.5-5.1)  mEq/L


 


Chloride     ()  mEq/L


 


Carbon Dioxide     (21-32)  mEq/L


 


Anion Gap     (5-15)  


 


BUN     (7-18)  mg/dL


 


Creatinine     (0.55-1.02)  mg/dL


 


Est Cr Clr Drug Dosing     mL/min


 


Estimated GFR (MDRD)     (>60)  mL/min


 


BUN/Creatinine Ratio     (14-18)  


 


Glucose     ()  mg/dL


 


Lactic Acid   1.1   (0.4-2.0)  mmol/L


 


Calcium     (8.5-10.1)  mg/dL


 


Magnesium     (1.8-2.4)  mg/dl


 


Total Bilirubin     (0.2-1.0)  mg/dL


 


AST     (15-37)  U/L


 


ALT     (14-59)  U/L


 


Alkaline Phosphatase     ()  U/L


 


Troponin I     (0.00-0.056)  ng/mL


 


C-Reactive Protein     (<1.0)  mg/dL


 


Total Protein     (6.4-8.2)  g/dl


 


Albumin     (3.4-5.0)  g/dl


 


Globulin     gm/dL


 


Albumin/Globulin Ratio     (1-2)  


 


Urine Color  Allison H    (Yellow)  


 


Urine Appearance  Cloudy H    (Clear)  


 


Urine pH  6.0    (5.0-8.0)  


 


Ur Specific Gravity  > or = 1.030    (1.005-1.030)  


 


Urine Protein  2+ H    (Negative)  


 


Urine Glucose (UA)  Negative    (Negative)  


 


Urine Ketones  2+ H    (Negative)  


 


Urine Occult Blood  3+ H    (Negative)  


 


Urine Nitrite  Positive H    (Negative)  


 


Urine Bilirubin  1+ H    (Negative)  


 


Urine Urobilinogen  1.0    (0.2-1.0)  


 


Ur Leukocyte Esterase  1+ H    (Negative)  


 


Urine RBC  40-50 H    (0-5)  /hpf


 


Urine WBC  >100 H    (0-5)  /hpf


 


Ur Squamous Epith Cells  5-10 H    (0-5)  /hpf


 


Urine Bacteria  Moderate H    (FEW)  /hpf


 


Urine Mucus  Not seen    (FEW)  /hpf














  03/27/20 Range/Units





  04:38 


 


WBC   (3.98-10.04)  K/mm3


 


RBC   (3.98-5.22)  M/mm3


 


Hgb   (11.2-15.7)  gm/dl


 


Hct   (34.1-44.9)  %


 


MCV   (79.4-94.8)  fl


 


MCH   (25.6-32.2)  pg


 


MCHC   (32.2-35.5)  g/dl


 


RDW Std Deviation   (36.4-46.3)  fL


 


Plt Count   (182-369)  K/mm3


 


MPV   (9.4-12.3)  fl


 


Neut % (Auto)   (34.0-71.1)  %


 


Lymph % (Auto)   (19.3-51.7)  %


 


Mono % (Auto)   (4.7-12.5)  %


 


Eos % (Auto)   (0.7-5.8)  


 


Baso % (Auto)   (0.1-1.2)  %


 


Neut # (Auto)   (1.56-6.13)  K/mm3


 


Lymph # (Auto)   (1.18-3.74)  K/mm3


 


Mono # (Auto)   (0.24-0.36)  K/mm3


 


Eos # (Auto)   (0.04-0.36)  K/mm3


 


Baso # (Auto)   (0.01-0.08)  K/mm3


 


Manual Slide Review   


 


Sodium  142  (136-145)  mEq/L


 


Potassium  4.4  (3.5-5.1)  mEq/L


 


Chloride  107  ()  mEq/L


 


Carbon Dioxide  24  (21-32)  mEq/L


 


Anion Gap  15.4 H  (5-15)  


 


BUN  27 H  (7-18)  mg/dL


 


Creatinine  1.2 H  (0.55-1.02)  mg/dL


 


Est Cr Clr Drug Dosing  33.65  mL/min


 


Estimated GFR (MDRD)  43  (>60)  mL/min


 


BUN/Creatinine Ratio  22.5 H  (14-18)  


 


Glucose  77 L  ()  mg/dL


 


Lactic Acid   (0.4-2.0)  mmol/L


 


Calcium  8.4 L  (8.5-10.1)  mg/dL


 


Magnesium  2.2  (1.8-2.4)  mg/dl


 


Total Bilirubin   (0.2-1.0)  mg/dL


 


AST   (15-37)  U/L


 


ALT   (14-59)  U/L


 


Alkaline Phosphatase   ()  U/L


 


Troponin I   (0.00-0.056)  ng/mL


 


C-Reactive Protein   (<1.0)  mg/dL


 


Total Protein   (6.4-8.2)  g/dl


 


Albumin   (3.4-5.0)  g/dl


 


Globulin   gm/dL


 


Albumin/Globulin Ratio   (1-2)  


 


Urine Color   (Yellow)  


 


Urine Appearance   (Clear)  


 


Urine pH   (5.0-8.0)  


 


Ur Specific Gravity   (1.005-1.030)  


 


Urine Protein   (Negative)  


 


Urine Glucose (UA)   (Negative)  


 


Urine Ketones   (Negative)  


 


Urine Occult Blood   (Negative)  


 


Urine Nitrite   (Negative)  


 


Urine Bilirubin   (Negative)  


 


Urine Urobilinogen   (0.2-1.0)  


 


Ur Leukocyte Esterase   (Negative)  


 


Urine RBC   (0-5)  /hpf


 


Urine WBC   (0-5)  /hpf


 


Ur Squamous Epith Cells   (0-5)  /hpf


 


Urine Bacteria   (FEW)  /hpf


 


Urine Mucus   (FEW)  /hpf











Sa[facility]l Results Last 24 Hours: 


 Microbiology











 03/26/20 14:30 Urine Culture - Preliminary





 Urine, Clean Catch    Gram Negative Rods











Med Orders - Current: 


 Current Medications





Acetaminophen (Tylenol)  650 mg PO Q4H PRN


   PRN Reason: Pain (Mild 1-3)/fever


Apixaban (Eliquis)  5 mg PO BID [facility]


   Last Admin: 03/26/20 20:45 Dose:  5 mg


Famotidine (Pepcid)  20 mg PO DAILY [facility]


Ceftriaxone Sodium 1 gm/ (Sodium Chloride)  100 mls @ 200 mls/hr IV Q24H [facility]


   Last Admin: 03/26/20 15:42 Dose:  200 mls/hr


Promethazine HCl 25 mg/ Sodium (Chloride)  51 mls @ 100 mls/hr IV Q6H PRN


   PRN Reason: Nausea/Vomiting


Magnesium Hydroxide (Milk Of Magnesia)  30 ml PO BID PRN


   PRN Reason: Constipation


Miscellaneous Information (Remove Patch)  1 ea TRDERM Q72H [facility]


Multivitamins (Thera)  1 each PO DAILY [facility]


Ondansetron HCl (Zofran)  4 mg IV Q6H PRN


   PRN Reason: Nausea/Vomiting


Scopolamine (Transderm-Scop)  1.5 mg TRDERM Q72H [facility]


   Last Admin: 03/26/20 20:45 Dose:  1.5 mg


Senna/Docusate Sodium (Senna Plus)  1 tab PO BID PRN


   PRN Reason: Constipation


Simethicone (Simethicone)  80 mg PO QID PRN


   PRN Reason: flatulence


Sodium Chloride (Saline Flush)  10 ml FLUSH ASDIRECTED PRN


   PRN Reason: Keep Vein Open


   Last Admin: 03/26/20 12:18 Dose:  10 ml


Tramadol HCl (Ultram)  50 mg PO Q6H PRN


   PRN Reason: Pain





Discontinued Medications





Al Hydroxide/Mg Hydroxide (Mag-Al Plus)  30 ml PO ONETIME ONE


   Stop: 03/26/20 13:42


   Last Admin: 03/26/20 13:47 Dose:  30 ml


Famotidine (Pepcid)  20 mg IVPUSH ONETIME ONE


   Stop: 03/26/20 14:33


   Last Admin: 03/26/20 14:38 Dose:  20 mg


Sodium Chloride (Normal Saline)  1,000 mls @ 999 mls/hr IV ASDIRECTED [facility]


   Last Admin: 03/26/20 12:18 Dose:  150 mls/hr


Sodium Chloride (Normal Saline)  1,000 mls @ 75 mls/hr IV ASDIRECTED [facility]


   Stop: 03/27/20 05:49


   Last Admin: 03/26/20 16:47 Dose:  75 mls/hr


Promethazine HCl 25 mg/ Sodium (Chloride)  51 mls @ 100 mls/hr IV ONETIME ONE


   Stop: 03/26/20 18:09


   Last Admin: 03/26/20 18:32 Dose:  100 mls/hr


Ondansetron HCl (Zofran)  4 mg IVPUSH ONETIME ONE


   Stop: 03/26/20 12:09


   Last Admin: 03/26/20 12:18 Dose:  4 mg


Ondansetron HCl (Zofran)  4 mg IVPUSH ONETIME ONE


   Stop: 03/26/20 14:31


   Last Admin: 03/26/20 14:38 Dose:  4 mg











- Exam


Quality Assessment: DVT Prophylaxis


General: Alert, Oriented, Cooperative, No Acute Distress


HEENT: Pupils Equal, Pupils Reactive, Mucous Membr. Moist/Pink


Neck: Supple, Trachea Midline


Lungs: Clear to Auscultation, Normal Respiratory Effort


Cardiovascular: Regular Rate, Regular Rhythm


GI/Abdominal Exam: Normal Bowel Sounds, Soft, No Distention, Tender (around 

incision site )


 (Female) Exam: Deferred


Back Exam: Normal Inspection, Full Range of Motion


Extremities: Normal Inspection, Normal Range of Motion, Non-Tender, Normal 

Capillary Refill


Skin: Warm, Dry, Intact


Wound/Incisions: Healing Well, No Drainage.  No: Erythema


Neurological: No New Focal Deficit


Psy/Mental Status: Alert, Normal Affect, Normal Mood





Sepsis Event Note





- Evaluation


Sepsis Screening Result: No Definite Risk





- Focused Exam


Vital Signs: 


 Vital Signs











  Temp Pulse Resp BP Pulse Ox


 


 03/27/20 05:24  98.2 F  81  20  119/63  94 L


 


 03/27/20 00:09   88   118/69  94 L


 


 03/27/20 00:08  97.7 F  92  20  99/69  94 L


 


 03/26/20 19:30  98.6 F  99  18  148/56 H  98











Date Exam was Performed: 03/27/20


Time Exam was Performed: 12:33





- Problem List & Annotations


(1) Nausea and vomiting


SNOMED Code(s): 21945491


   Code(s): R11.2 - NAUSEA WITH VOMITING, UNSPECIFIED   Status: Resolved   

Priority: High   Current Visit: Yes   


Qualifiers: 


   Vomiting type: unspecified   Vomiting Intractability: intractable   

Qualified Code(s): R11.2 - Nausea with vomiting, unspecified   





(2) History of ovarian cancer


Status: Chronic   Priority: Medium   Current Visit: No   





(3) Colon cancer


SNOMED Code(s): 557512545


   Code(s): C18.9 - MALIGNANT NEOPLASM OF COLON, UNSPECIFIED   Status: Acute   

Priority: High   Current Visit: Yes   


Qualifiers: 


   Colon location: unspecified part of colon   Qualified Code(s): C18.9 - 

Malignant neoplasm of colon, unspecified   





(4) Leukocytosis


SNOMED Code(s): 657933430, 479481496


   Code(s): D72.829 - ELEVATED WHITE BLOOD CELL COUNT, UNSPECIFIED   Status: 

Acute   Priority: High   Current Visit: Yes   


Qualifiers: 


   Leukocytosis type: unspecified   Qualified Code(s): D72.829 - Elevated white 

blood cell count, unspecified   





(5) Acute renal injury


SNOMED Code(s): 47861582, 57103295


   Code(s): N17.9 - ACUTE KIDNEY FAILURE, UNSPECIFIED   Status: Acute   Priority

: High   Current Visit: Yes   





(6) High anion gap metabolic acidosis


SNOMED Code(s): 14940786


   Code(s): E87.2 - ACIDOSIS   Status: Acute   Priority: High   Current Visit: 

Yes   





(7) History of venous thromboembolism


SNOMED Code(s): 046403353


   Code(s): Z86.718 - PERSONAL HISTORY OF OTHER VENOUS THROMBOSIS AND EMBOLISM 

  Status: Chronic   Priority: Medium   Current Visit: Yes   





(8) Chronic anticoagulation


SNOMED Code(s): 179528828


   Code(s): Z79.01 - LONG TERM (CURRENT) USE OF ANTICOAGULANTS   Status: 

Chronic   Priority: Low   Current Visit: Yes   





(9) HLD (hyperlipidemia)


SNOMED Code(s): 96727175


   Code(s): E78.5 - HYPERLIPIDEMIA, UNSPECIFIED   Status: Chronic   Priority: 

Low   Current Visit: No   


Qualifiers: 


   Hyperlipidemia type: unspecified   Qualified Code(s): E78.5 - Hyperlipidemia

, unspecified   





(10) GERD (gastroesophageal reflux disease)


SNOMED Code(s): 585338291


   Code(s): K21.9 - GASTRO-ESOPHAGEAL REFLUX DISEASE WITHOUT ESOPHAGITIS   

Status: Chronic   Priority: Medium   Current Visit: No   


Qualifiers: 


   Esophagitis presence: esophagitis presence not specified   Qualified Code(s)

: K21.9 - Gastro-esophageal reflux disease without esophagitis   





(11) Obesity


SNOMED Code(s): 807364744, 289919819


   Code(s): E66.9 - OBESITY, UNSPECIFIED   Status: Chronic   Priority: Medium   

Current Visit: No   


Qualifiers: 


   Obesity type: unspecified obesity type   Obesity classification: adult class 

2 (BMI 35 - 39.9)   Body mass index: BMI 38.0-38.9 





(12) Hematochezia


SNOMED Code(s): 466314977


   Code(s): K92.1 - MELENA   Status: Chronic   Priority: Medium   Current Visit

: Yes   





(13) Urinary tract infection


SNOMED Code(s): 76255048


   Code(s): N39.0 - URINARY TRACT INFECTION, SITE NOT SPECIFIED   Status: Acute

   Priority: High   Current Visit: Yes   


Qualifiers: 


   Urinary tract infection type: site unspecified   Hematuria presence: with 

hematuria   Qualified Code(s): N39.0 - Urinary tract infection, site not 

specified; R31.9 - Hematuria, unspecified   





(14) S/P laparotomy


SNOMED Code(s): 973080104, 516887944, 800913549


   Code(s): Z98.890 - OTHER SPECIFIED POSTPROCEDURAL STATES   Status: Acute   

Priority: Medium   Current Visit: Yes   





(15) Volume depletion


SNOMED Code(s): 34892537


   Code(s): E86.9 - VOLUME DEPLETION, UNSPECIFIED   Status: Acute   Current 

Visit: Yes   





- Problem List Review


Problem List Initiated/Reviewed/Updated: Yes





- My Orders


Last 24 Hours: 


My Active Orders





03/26/20 16:14


Oxygen Therapy [RC] PRN 


Pulse Oximetry [RC] PRN 


VTE/DVT Education [RC] 09,21 


Vital Signs [RC] Q4HR 


Consult to Case Management/ [CONS] Routine 


Consult to Dietician [CONS] Routine 


Consult to Spiritual Care [CONS] Routine 


OT Evaluation and Treatment [CONS] Routine 


PT Evaluation and Treatment [CONS] Routine 


Acetaminophen [Tylenol]   650 mg PO Q4H PRN 


Ondansetron [Zofran]   4 mg IV Q6H PRN 





03/26/20 16:20


Docusate Sodium/Sennosides [Senna Plus]   1 tab PO BID PRN 


Simethicone   80 mg PO QID PRN 


traMADol [Ultram]   50 mg PO Q6H PRN 





03/26/20 18:32


Promethazine [Phenergan] 25 mg   Sodium Chloride 0.9% [Normal Saline] 50 ml IV 

Q6H 





03/26/20 18:33


Magnesium Hydroxide [Milk of Magnesia]   30 ml PO BID PRN 





03/26/20 20:00


Scopolamine [Transderm-Scop]   1.5 mg TRDERM Q72H 





03/26/20 21:00


Apixaban [Eliquis]   5 mg PO BID 





03/26/20 Lunch


Heart Healthy Diet [DIET] 





03/27/20 09:00


Famotidine [Pepcid]   20 mg PO DAILY 


Multivitamins,Therapeutic [Thera]   1 each PO DAILY 





03/28/20 05:11


BASIC METABOLIC PANEL,BMP [CHEM] AM 


CBC WITH AUTO DIFF [HEME] AM 


MAGNESIUM [CHEM] AM 





03/29/20 05:11


BASIC METABOLIC PANEL,BMP [CHEM] AM 


CBC WITH AUTO DIFF [HEME] AM 


MAGNESIUM [CHEM] AM 





03/30/20 05:11


BASIC METABOLIC PANEL,BMP [CHEM] AM 


CBC WITH AUTO DIFF [HEME] AM 


MAGNESIUM [CHEM] AM 














- Plan


Plan:: 


I/P:





Acute:





Urinary tract infection


   -Reports hematuria


   -Recently underwent surgery at [facility] - martinez catheter placed during 

procedure


   -Denies any fever


   -WBC 17.18-->13.51


   -CRP 5.3


   -UA: Cloudy, concentrated, 2+ protein, 2+ ketones, 3+ occult blood, positive 

nitrite, 1+ bilirubin, 1+ leukocyte esterase, 40-50 RBC, greater than 100 WBCs, 

moderate bacteria.


   -Urine culture: Gram negative rods thus far


   -Blood cultures pending


   -Sepsis criteria:


      -Apparent bacterial infection, No fever, WBC 17.18, No tachypnea, No 

tachycardia, Lactic acid 1.1


      -Does not meet sepsis criteria at this point


   -Started on Rocephin 1gm in ED - continue


   -IV fluids as indicated





S/P Nausea and vomiting


   -Reports history of significant post-operative nausea and vomiting


   -Has had symptoms since return from surgery


   -Given IV fluids and zofran after discharge with minimal brief improvement 

of symptoms


   -Zofran in ED with minimal improvement


   -Start Phenergan PRN


   -Scopolamine patch 


   -Monitor electrolytes - good so far


   -Last BM 3/25/20


   


S/P laparotomy 2/2 planned colon resection


   -Performed at [facility]


   -Daughter reports procedure aborted once she was opened due to significance 

of neoplasm


      -Planning chemotherapy in [place]


   -No signs of infection of surgical site


   -Continue to monitor 





Acute kidney injury, improving 


   -Likely 2/2 dehydration from vomiting


   -BUN 27-->27


   -Creatinine 1.3-->1.2


   -eGFR 39-->43


   -IV bolus given in ED


   -IV fluids as ordered





Volume depletion, improving 


   -TRINO as above


   -Concentrated urine


   -Anion gap 17.8-->15.4


   -IV fluids as indicated





Chronic:


prior VTE


HLD


chronic constipation


GERD


obesity


colon 


Hx/o ovarian cancer


S/P oophorectomy in 2007





Plan:


Admit to medical floor


Routine AM labs


Other orders as indicated above


Review home medications 


PT/OT


CM/SW for discharge planning


Dietician consult


DVT prophylaxis: Home Eliquis


Code status: Full Code


PCP: Dr. Patel


Discharge plan: Likely discharge in 1-2 days pending urine culture and 

continued response to treatment.

## 2020-03-28 RX ADMIN — THERA TABS SCH: TAB at 08:14

## 2020-03-28 RX ADMIN — ONDANSETRON SCH MG: 4 TABLET, ORALLY DISINTEGRATING ORAL at 12:12

## 2020-03-28 NOTE — PCM.PN
- General Info


Date of Service: 03/28/20


Admission Dx/Problem (Free Text): 


 Admission Diagnosis/Problem





Admission Diagnosis/Problem      Urinary tract infection








Functional Status: Reports: Pain Controlled, Tolerating Diet, Ambulating, 

Urinating.  Denies: New Symptoms





- Review of Systems


General: Reports: Weakness.  Denies: Fever, Fatigue, Malaise, Chills


HEENT: Denies: Headaches, Sore Throat


Pulmonary: Denies: Shortness of Breath, Pleuritic Chest Pain, Cough, Sputum, 

Wheezing


Cardiovascular: Denies: Chest Pain, Palpitations, Dyspnea on Exertion


Gastrointestinal: Reports: Abdominal Pain (mild from surgical site ), 

Constipation, Decreased Appetite, Other (Feels bloated ).  Denies: Diarrhea, 

Nausea, Vomiting


Genitourinary: Reports: No Symptoms.  Denies: Pain


Musculoskeletal: Reports: No Symptoms


Skin: Reports: No Symptoms


Neurological: Reports: Difficulty Walking, Weakness.  Denies: Gait Disturbance


Psychiatric: Reports: No Symptoms





- Patient Data


Vitals - Most Recent: 


 Last Vital Signs











Temp  97.0 F   03/28/20 08:13


 


Pulse  91   03/28/20 08:13


 


Resp  18   03/28/20 08:13


 


BP  139/54 L  03/28/20 08:13


 


Pulse Ox  97   03/28/20 08:13











Weight - Most Recent: 235 lb 4.8 oz


I&O - Last 24 Hours: 


 Intake & Output











 03/27/20 03/28/20 03/28/20





 22:59 06:59 14:59


 


Intake Total 1786 1480 300


 


Output Total 200 400 


 


Balance 1586 1080 300











Lab Results Last 24 Hours: 


 Laboratory Results - last 24 hr











  03/28/20 03/28/20 Range/Units





  05:50 05:50 


 


WBC  11.48 H   (3.98-10.04)  K/mm3


 


RBC  3.95 L   (3.98-5.22)  M/mm3


 


Hgb  11.7   (11.2-15.7)  gm/dl


 


Hct  38.3   (34.1-44.9)  %


 


MCV  97.0 H   (79.4-94.8)  fl


 


MCH  29.6   (25.6-32.2)  pg


 


MCHC  30.5 L   (32.2-35.5)  g/dl


 


RDW Std Deviation  47.4 H   (36.4-46.3)  fL


 


Plt Count  445 H   (182-369)  K/mm3


 


MPV  9.6   (9.4-12.3)  fl


 


Neut % (Auto)  66.0   (34.0-71.1)  %


 


Lymph % (Auto)  16.4 L   (19.3-51.7)  %


 


Mono % (Auto)  13.7 H   (4.7-12.5)  %


 


Eos % (Auto)  2.6   (0.7-5.8)  


 


Baso % (Auto)  0.3   (0.1-1.2)  %


 


Neut # (Auto)  7.57 H   (1.56-6.13)  K/mm3


 


Lymph # (Auto)  1.88   (1.18-3.74)  K/mm3


 


Mono # (Auto)  1.57 H   (0.24-0.36)  K/mm3


 


Eos # (Auto)  0.30   (0.04-0.36)  K/mm3


 


Baso # (Auto)  0.04   (0.01-0.08)  K/mm3


 


Manual Slide Review  Normal smear   


 


Sodium   142  (136-145)  mEq/L


 


Potassium   4.1  (3.5-5.1)  mEq/L


 


Chloride   106  ()  mEq/L


 


Carbon Dioxide   26  (21-32)  mEq/L


 


Anion Gap   14.1  (5-15)  


 


BUN   22 H  (7-18)  mg/dL


 


Creatinine   1.2 H  (0.55-1.02)  mg/dL


 


Est Cr Clr Drug Dosing   33.65  mL/min


 


Estimated GFR (MDRD)   43  (>60)  mL/min


 


BUN/Creatinine Ratio   18.3 H  (14-18)  


 


Glucose   94  ()  mg/dL


 


Calcium   8.7  (8.5-10.1)  mg/dL


 


Magnesium   2.1  (1.8-2.4)  mg/dl











Saúl Results Last 24 Hours: 


 Microbiology











 03/26/20 14:30 Urine Culture - Final





 Urine, Clean Catch    Klebsiella Oxytoca


 


 03/26/20 15:18 Aerobic Blood Culture - Preliminary





 Blood - Venous    NO GROWTH AFTER 1 DAY





 Anaerobic Blood Culture - Preliminary





    NO GROWTH AFTER 1 DAY


 


 03/26/20 15:35 Aerobic Blood Culture - Preliminary





 Blood - Venous - Lab Draw    NO GROWTH AFTER 1 DAY





 Anaerobic Blood Culture - Preliminary





    NO GROWTH AFTER 1 DAY











Med Orders - Current: 


 Current Medications





Acetaminophen (Tylenol)  650 mg PO Q4H PRN


   PRN Reason: Pain (Mild 1-3)/fever


   Last Admin: 03/28/20 05:07 Dose:  650 mg


Apixaban (Eliquis)  5 mg PO BID [facility]


   Last Admin: 03/28/20 08:14 Dose:  5 mg


Famotidine (Pepcid)  20 mg PO DAILY [facility]


   Last Admin: 03/28/20 08:14 Dose:  20 mg


Ceftriaxone Sodium 1 gm/ (Sodium Chloride)  100 mls @ 200 mls/hr IV Q24H [facility]


   Last Admin: 03/27/20 14:58 Dose:  200 mls/hr


Promethazine HCl 25 mg/ Sodium (Chloride)  51 mls @ 100 mls/hr IV Q6H PRN


   PRN Reason: Nausea/Vomiting


Sodium Chloride (Normal Saline)  1,000 mls @ 100 mls/hr IV ASDIRECTED [facility]


   Stop: 03/28/20 17:29


   Last Admin: 03/27/20 17:44 Dose:  100 mls/hr


Magnesium Hydroxide (Milk Of Magnesia)  30 ml PO BID PRN


   PRN Reason: Constipation


Miscellaneous Information (Remove Patch)  1 ea TRDERM Q72H [facility]


Multivitamins (Thera)  1 each PO DAILY [facility]


   Last Admin: 03/28/20 08:14 Dose:  Not Given


Ondansetron HCl (Zofran)  4 mg IV Q6H PRN


   PRN Reason: Nausea/Vomiting


Scopolamine (Transderm-Scop)  1.5 mg TRDERM Q72H [facility]


   Last Admin: 03/26/20 20:45 Dose:  1.5 mg


Senna/Docusate Sodium (Senna Plus)  1 tab PO BID PRN


   PRN Reason: Constipation


   Last Admin: 03/28/20 05:07 Dose:  1 tab


Simethicone (Simethicone)  80 mg PO QID PRN


   PRN Reason: flatulence


Sodium Chloride (Saline Flush)  10 ml FLUSH ASDIRECTED PRN


   PRN Reason: Keep Vein Open


   Last Admin: 03/26/20 12:18 Dose:  10 ml


Tramadol HCl (Ultram)  50 mg PO Q6H PRN


   PRN Reason: Pain





Discontinued Medications





Al Hydroxide/Mg Hydroxide (Mag-Al Plus)  30 ml PO ONETIME ONE


   Stop: 03/26/20 13:42


   Last Admin: 03/26/20 13:47 Dose:  30 ml


Famotidine (Pepcid)  20 mg IVPUSH ONETIME ONE


   Stop: 03/26/20 14:33


   Last Admin: 03/26/20 14:38 Dose:  20 mg


Sodium Chloride (Normal Saline)  1,000 mls @ 999 mls/hr IV ASDIRECTED [facility]


   Last Admin: 03/26/20 12:18 Dose:  150 mls/hr


Sodium Chloride (Normal Saline)  1,000 mls @ 75 mls/hr IV ASDIRECTED [facility]


   Stop: 03/27/20 05:49


   Last Admin: 03/26/20 16:47 Dose:  75 mls/hr


Promethazine HCl 25 mg/ Sodium (Chloride)  51 mls @ 100 mls/hr IV ONETIME ONE


   Stop: 03/26/20 18:09


   Last Admin: 03/26/20 18:32 Dose:  100 mls/hr


Ondansetron HCl (Zofran)  4 mg IVPUSH ONETIME ONE


   Stop: 03/26/20 12:09


   Last Admin: 03/26/20 12:18 Dose:  4 mg


Ondansetron HCl (Zofran)  4 mg IVPUSH ONETIME ONE


   Stop: 03/26/20 14:31


   Last Admin: 03/26/20 14:38 Dose:  4 mg











- Exam


Quality Assessment: DVT Prophylaxis


General: Alert, Oriented, Cooperative, No Acute Distress


HEENT: Pupils Equal, Pupils Reactive, Mucous Membr. Moist/Pink


Neck: Supple, Trachea Midline


Lungs: Clear to Auscultation, Normal Respiratory Effort


Cardiovascular: Regular Rate, Regular Rhythm


GI/Abdominal Exam: Normal Bowel Sounds, Soft, Non-Tender, No Distention


 (Female) Exam: Deferred


Back Exam: Normal Inspection, Full Range of Motion


Extremities: Normal Inspection, Normal Range of Motion, Non-Tender, Normal 

Capillary Refill


Skin: Warm, Dry, Intact


Neurological: No New Focal Deficit


Psy/Mental Status: Alert, Normal Affect, Normal Mood





Sepsis Event Note





- Evaluation


Sepsis Screening Result: No Definite Risk





- Focused Exam


Vital Signs: 


 Vital Signs











  Temp Pulse Resp BP Pulse Ox


 


 03/28/20 08:13  97.0 F  91  18  139/54 L  97


 


 03/28/20 03:47  97.3 F  83  20  138/82  95


 


 03/28/20 00:04  98.2 F  87  16  117/71  92 L











Date Exam was Performed: 03/28/20


Time Exam was Performed: 12:12





- Problem List & Annotations


(1) Nausea and vomiting


SNOMED Code(s): 37127475


   Code(s): R11.2 - NAUSEA WITH VOMITING, UNSPECIFIED   Status: Resolved   

Priority: High   Current Visit: Yes   


Qualifiers: 


   Vomiting type: unspecified   Vomiting Intractability: intractable   

Qualified Code(s): R11.2 - Nausea with vomiting, unspecified   





(2) History of ovarian cancer


Status: Chronic   Priority: Medium   Current Visit: No   





(3) Colon cancer


SNOMED Code(s): 249274559


   Code(s): C18.9 - MALIGNANT NEOPLASM OF COLON, UNSPECIFIED   Status: Acute   

Priority: High   Current Visit: Yes   


Qualifiers: 


   Colon location: unspecified part of colon   Qualified Code(s): C18.9 - 

Malignant neoplasm of colon, unspecified   





(4) Leukocytosis


SNOMED Code(s): 359289978, 905579888


   Code(s): D72.829 - ELEVATED WHITE BLOOD CELL COUNT, UNSPECIFIED   Status: 

Acute   Priority: High   Current Visit: Yes   


Qualifiers: 


   Leukocytosis type: unspecified   Qualified Code(s): D72.829 - Elevated white 

blood cell count, unspecified   





(5) Acute renal injury


SNOMED Code(s): 72260671, 84249804


   Code(s): N17.9 - ACUTE KIDNEY FAILURE, UNSPECIFIED   Status: Acute   Priority

: High   Current Visit: Yes   





(6) High anion gap metabolic acidosis


SNOMED Code(s): 44156762


   Code(s): E87.2 - ACIDOSIS   Status: Acute   Priority: High   Current Visit: 

Yes   





(7) History of venous thromboembolism


SNOMED Code(s): 367111680


   Code(s): Z86.718 - PERSONAL HISTORY OF OTHER VENOUS THROMBOSIS AND EMBOLISM 

  Status: Chronic   Priority: Medium   Current Visit: Yes   





(8) Chronic anticoagulation


SNOMED Code(s): 328187045


   Code(s): Z79.01 - LONG TERM (CURRENT) USE OF ANTICOAGULANTS   Status: 

Chronic   Priority: Low   Current Visit: Yes   





(9) HLD (hyperlipidemia)


SNOMED Code(s): 56999939


   Code(s): E78.5 - HYPERLIPIDEMIA, UNSPECIFIED   Status: Chronic   Priority: 

Low   Current Visit: No   


Qualifiers: 


   Hyperlipidemia type: unspecified   Qualified Code(s): E78.5 - Hyperlipidemia

, unspecified   





(10) GERD (gastroesophageal reflux disease)


SNOMED Code(s): 417015586


   Code(s): K21.9 - GASTRO-ESOPHAGEAL REFLUX DISEASE WITHOUT ESOPHAGITIS   

Status: Chronic   Priority: Medium   Current Visit: No   


Qualifiers: 


   Esophagitis presence: esophagitis presence not specified   Qualified Code(s)

: K21.9 - Gastro-esophageal reflux disease without esophagitis   





(11) Obesity


SNOMED Code(s): 279190057, 305031775


   Code(s): E66.9 - OBESITY, UNSPECIFIED   Status: Chronic   Priority: Medium   

Current Visit: No   


Qualifiers: 


   Obesity type: unspecified obesity type   Obesity classification: adult class 

2 (BMI 35 - 39.9)   Body mass index: BMI 38.0-38.9 





(12) Hematochezia


SNOMED Code(s): 535948773


   Code(s): K92.1 - MELENA   Status: Chronic   Priority: Medium   Current Visit

: Yes   





(13) Urinary tract infection


SNOMED Code(s): 69777941


   Code(s): N39.0 - URINARY TRACT INFECTION, SITE NOT SPECIFIED   Status: Acute

   Priority: High   Current Visit: Yes   


Qualifiers: 


   Urinary tract infection type: site unspecified   Hematuria presence: with 

hematuria   Qualified Code(s): N39.0 - Urinary tract infection, site not 

specified; R31.9 - Hematuria, unspecified   





(14) S/P laparotomy


SNOMED Code(s): 277696278, 483059857, 063904206


   Code(s): Z98.890 - OTHER SPECIFIED POSTPROCEDURAL STATES   Status: Acute   

Priority: Medium   Current Visit: Yes   





(15) Volume depletion


SNOMED Code(s): 70693058


   Code(s): E86.9 - VOLUME DEPLETION, UNSPECIFIED   Status: Acute   Current 

Visit: Yes   





(16) Chronic constipation


SNOMED Code(s): 942890217


   Code(s): K59.09 - OTHER CONSTIPATION   Status: Chronic   Priority: High   

Current Visit: Yes   





- Problem List Review


Problem List Initiated/Reviewed/Updated: Yes





- My Orders


Last 24 Hours: 


My Active Orders





03/27/20 Dinner


Regular Diet [DIET] 





03/29/20 05:11


BASIC METABOLIC PANEL,BMP [CHEM] AM 


CBC WITH AUTO DIFF [HEME] AM 


MAGNESIUM [CHEM] AM 





03/30/20 05:11


BASIC METABOLIC PANEL,BMP [CHEM] AM 


CBC WITH AUTO DIFF [HEME] AM 


MAGNESIUM [CHEM] AM 














- Plan


Plan:: 


I/P:





Acute:





Urinary tract infection


   -Reports hematuria


   -Recently underwent surgery at [facility] - martinez catheter placed during 

procedure


   -Denies any fever


   -WBC 17.18-->13.51-->11.48


   -CRP 5.3


   -UA: Cloudy, concentrated, 2+ protein, 2+ ketones, 3+ occult blood, positive 

nitrite, 1+ bilirubin, 1+ leukocyte esterase, 40-50 RBC, greater than 100 WBCs, 

moderate bacteria.


   -Urine culture: Pan sensitive Klebsiella


   -Blood cultures negative 


   -Sepsis criteria:


      -Apparent bacterial infection, No fever, WBC 17.18, No tachypnea, No 

tachycardia, Lactic acid 1.1


      -Does not meet sepsis criteria at this point


   -Started on Rocephin 1gm in ED - Discontinue and switch to p.o. Omnicef today


   -IV fluids as indicated





S/P Nausea and vomiting


   -Reports history of significant post-operative nausea and vomiting


   -Has had symptoms since return from surgery


   -Given IV fluids and zofran after discharge with minimal brief improvement 

of symptoms


   -Zofran in ED with minimal improvement


   -Start Phenergan PRN -> discontinue and schedule zofran 


   -Scopolamine patch 


   -Monitor electrolytes - good so far


   


S/P laparotomy 2/2 planned colon resection


   -Performed at [facility]


   -Daughter reports procedure aborted once she was opened due to significance 

of neoplasm


      -Planning chemotherapy in [place]


   -No signs of infection of surgical site


   -Continue to monitor 





Acute kidney injury, improving 


   -Likely 2/2 dehydration from vomiting


   -BUN 27-->27-->22


   -Creatinine 1.3-->1.2-->1.2


   -eGFR 39-->43-->1.3


   -IV bolus given in ED


   -IV fluids as ordered





Volume depletion, improving 


   -TRINO as above


   -Concentrated urine


   -Anion gap 17.8-->15.4-->14.1


   -IV fluids as indicated





Acute on chronic constipation


   -Last BM 3/25/20


   -Likely exacerbated by prior abdominal surgery


   -States she feels bloated


   -Start PO fiber pill


   -Scheduled senna


   -PRN simethicone for bloating


   -Ambulate 





Chronic:


prior VTE


HLD


chronic constipation


GERD


obesity


colon 


Hx/o ovarian cancer


S/P oophorectomy in 2007





Plan:


Admit to medical floor


Routine AM labs


Other orders as indicated above


Review home medications 


PT/OT


CM/SW for discharge planning


Dietician consult


DVT prophylaxis: Home Eliquis


Code status: Full Code


PCP: Dr. Patel


Discharge plan: Likely discharge tomorrow pending continued response to 

treatment.

## 2020-03-29 RX ADMIN — ONDANSETRON SCH MG: 4 TABLET, ORALLY DISINTEGRATING ORAL at 00:47

## 2020-03-29 RX ADMIN — Medication SCH EA: at 20:00

## 2020-03-29 RX ADMIN — THERA TABS SCH: TAB at 08:46

## 2020-03-29 RX ADMIN — SODIUM CHLORIDE PRN MG: 9 INJECTION, SOLUTION INTRAVENOUS at 23:02

## 2020-03-29 RX ADMIN — SCOPALAMINE SCH MG: 1 PATCH, EXTENDED RELEASE TRANSDERMAL at 20:00

## 2020-03-29 NOTE — PCM.CONS
H&P History of Present Illness





- General


Date of Service: 03/29/20


Admit Problem/Dx: 


 Admission Diagnosis/Problem





Admission Diagnosis/Problem      Urinary tract infection








Source of Information: Patient, Provider





- History of Present Illness


Initial Comments - Free Text/Narative: 





The patient is an 80 y/o lady with metastatic ovarian cancer who recently 

underwent a laparotomy a [facility] for resection of tumor about 11 days ago. 

The surgery was unsuccessful for any resection, but she did have biopsies done. 

She had persistent nausea after her surgery at [facility], but she was not 

sure if this was as a result of anesthesia reaction. She has not had any bowel 

movement in the last 4 days. She has no history of any bowel obstructions. 





The patient presented to the hospital 3 days ago and began treatment for a UTI. 

She was improving with the nausea but worsened again. She then had an NGT 

placed. She has had a small amount of output since that time.


  ** Middle Abdomen


Pain Score (Numeric/FACES): 4





- Related Data


Allergies/Adverse Reactions: 


 Allergies











Allergy/AdvReac Type Severity Reaction Status Date / Time


 


shellfish derived Allergy  Rash Verified 03/26/20 17:00











Home Medications: 


 Home Meds





Acetaminophen 1,000 mg PO Q6HR PRN 03/26/20 [History]


Apixaban [Eliquis] 5 mg PO BID 03/26/20 [History]


Magnesium Hydroxide [Milk of Magnesia] 30 ml PO BID PRN 03/26/20 [History]


Multivitamin [Daily Bianca] 1 each PO DAILY 03/26/20 [History]


Omeprazole 20 mg PO DAILY PRN 03/26/20 [History]


Sennosides/Docusate Sodium [Sennosides-Docusate Sodium] 1 each PO BID PRN 03/26/ 20 [History]


Simethicone 80 mg PO QID PRN 03/26/20 [History]


Simvastatin 5 mg PO BEDTIME 03/26/20 [History]


traMADol [Ultram] 50 mg PO Q6H PRN 03/26/20 [History]











Past Medical History


HEENT History: Reports: Hard of Hearing


Cardiovascular History: Reports: Blood Clots/VTE/DVT, High Cholesterol


Respiratory History: Reports: None


Gastrointestinal History: Reports: Chronic Constipation, GERD


OB/GYN History: Reports: Pregnancy


Musculoskeletal History: Reports: None


Neurological History: Reports: None


Psychiatric History: Reports: None


Endocrine/Metabolic History: Reports: Obesity/BMI 30+


Hematologic History: Reports: Anticoagulation Therapy, Blood Transfusion(s)


Immunologic History: Reports: None


Oncologic (Cancer) History: Reports: Colon, Ovarian


Dermatologic History: Reports: None





- Infectious Disease History


Infectious Disease History: Reports: None





- Past Surgical History


GI Surgical History: Reports: Colonoscopy, Other (See Below)


Other GI Surgeries/Procedures: Exploratory Laparotomy


Female  Surgical History: Reports: Other (See Below)


Other Female  Surgeries/Procedures: Ovaries Removed in 2007


Oncologic Surgical History: Reports: Biopsy of Breast





Social & Family History





- Family History


Oncologic: Reports: Ovarian (sister)





- Tobacco Use


Smoking Status *Q: Never Smoker


Years of Tobacco use: 20


Packs/Tins Daily: 1


Used Tobacco, but Quit: Yes


Month/Year Tobacco Last Used: Jan 1980


Second Hand Smoke Exposure: No





- Caffeine Use


Caffeine Use: Reports: Coffee





- Recreational Drug Use


Recreational Drug Use: No





H&P Review of Systems





- Review of Systems:


Review Of Systems: See Below


General: Reports: Malaise, Weakness, Fatigue.  Denies: Fever, Chills


HEENT: Reports: No Symptoms


Pulmonary: Reports: No Symptoms


Cardiovascular: Reports: No Symptoms


Gastrointestinal: Reports: Bloody Stool, Nausea, Vomiting.  Denies: Abdominal 

Pain


Genitourinary: Reports: No Symptoms


Musculoskeletal: Reports: No Symptoms


Skin: Reports: Bruising (on abdomen)


Hematologic/Lymphatic: Reports: No Symptoms


Immunologic: Reports: No Symptoms





Exam





- Exam


Exam: See Below





- Vital Signs


Vital Signs: 


 Last Vital Signs











Temp  36.6 C   03/29/20 08:43


 


Pulse  90   03/29/20 08:43


 


Resp  18   03/29/20 08:43


 


BP  129/89   03/29/20 08:43


 


Pulse Ox  95   03/29/20 08:43











Weight: 107.501 kg





- Exam


Quality Assessment: No: Supplemental Oxygen


General: Alert, Oriented


HEENT: Conjunctiva Clear, EOMI, Other (NGT in place)


Neck: Supple


Lungs: Clear to Auscultation, Normal Respiratory Effort


Cardiovascular: Regular Rate, Regular Rhythm


GI/Abdominal Exam: Soft, Distended (mild), Tender (in LUQ), Other (firm in LUQ)


Extremities: Normal Inspection, No Pedal Edema


Peripheral Pulses: 2+: Dorsalis Pedis (L), Dorsalis Pedis (R)


Skin: Warm, Dry, Intact


Neurological: Cranial Nerves Intact


Neuro Extensive - Mental Status: Normal Mood/Affect





- Patient Data


Lab Results Last 24 hrs: 


 Laboratory Results - last 24 hr











  03/29/20 03/29/20 Range/Units





  05:35 05:35 


 


WBC  10.96 H   (3.98-10.04)  K/mm3


 


RBC  4.02   (3.98-5.22)  M/mm3


 


Hgb  12.0   (11.2-15.7)  gm/dl


 


Hct  38.8   (34.1-44.9)  %


 


MCV  96.5 H   (79.4-94.8)  fl


 


MCH  29.9   (25.6-32.2)  pg


 


MCHC  30.9 L   (32.2-35.5)  g/dl


 


RDW Std Deviation  46.6 H   (36.4-46.3)  fL


 


Plt Count  439 H   (182-369)  K/mm3


 


MPV  9.9   (9.4-12.3)  fl


 


Neut % (Auto)  61.7   (34.0-71.1)  %


 


Lymph % (Auto)  21.4   (19.3-51.7)  %


 


Mono % (Auto)  13.2 H   (4.7-12.5)  %


 


Eos % (Auto)  2.4   (0.7-5.8)  


 


Baso % (Auto)  0.3   (0.1-1.2)  %


 


Neut # (Auto)  6.77 H   (1.56-6.13)  K/mm3


 


Lymph # (Auto)  2.34   (1.18-3.74)  K/mm3


 


Mono # (Auto)  1.45 H   (0.24-0.36)  K/mm3


 


Eos # (Auto)  0.26   (0.04-0.36)  K/mm3


 


Baso # (Auto)  0.03   (0.01-0.08)  K/mm3


 


Manual Slide Review  Normal smear   


 


Sodium   143  (136-145)  mEq/L


 


Potassium   4.1  (3.5-5.1)  mEq/L


 


Chloride   106  ()  mEq/L


 


Carbon Dioxide   24  (21-32)  mEq/L


 


Anion Gap   17.1 H  (5-15)  


 


BUN   20 H  (7-18)  mg/dL


 


Creatinine   1.2 H  (0.55-1.02)  mg/dL


 


Est Cr Clr Drug Dosing   33.65  mL/min


 


Estimated GFR (MDRD)   43  (>60)  mL/min


 


BUN/Creatinine Ratio   16.7  (14-18)  


 


Glucose   99  ()  mg/dL


 


Calcium   8.7  (8.5-10.1)  mg/dL


 


Magnesium   2.1  (1.8-2.4)  mg/dl











Result Diagrams: 


 03/29/20 05:35





 03/29/20 05:35


Saúl Results Last 24 hrs: 


 Microbiology











 03/26/20 15:18 Aerobic Blood Culture - Preliminary





 Blood - Venous    NO GROWTH AFTER 2 DAYS





 Anaerobic Blood Culture - Preliminary





    NO GROWTH AFTER 2 DAYS


 


 03/26/20 15:35 Aerobic Blood Culture - Preliminary





 Blood - Venous - Lab Draw    NO GROWTH AFTER 2 DAYS





 Anaerobic Blood Culture - Preliminary





    NO GROWTH AFTER 2 DAYS


 


 03/26/20 14:30 Urine Culture - Final





 Urine, Clean Catch    Klebsiella Oxytoca














Sepsis Event Note





- Evaluation


Sepsis Screening Result: No Definite Risk





- Focused Exam


Vital Signs: 


 Vital Signs











  Temp Pulse Resp BP Pulse Ox


 


 03/29/20 08:43  36.6 C  90  18  129/89  95


 


 03/29/20 05:20  36.4 C  91  20  135/80  90 L


 


 03/29/20 00:46  36.7 C  80  18  134/64  93 L











Date Exam was Performed: 03/29/20


Time Exam was Performed: 13:17





*Q Meaningful Use (ADM)





- VTE Risk Assess *Q


Each Risk Factor Represents 2 Points: Malignancy (present or previous), Major 

surgery greater than 45 minutes


Total Score 2 Point Risk Factors: 4


Each Risk Factor Represents 3 Points: Age 75 Years or Greater, History of DVT/PE


Total Score 3 Point Risk Factors: 6





Consult PN Assessment/Plan


POD#: other (11)


(1) S/P laparotomy


SNOMED Code(s): 069799140, 369972404, 975219109


   Code(s): Z98.890 - OTHER SPECIFIED POSTPROCEDURAL STATES   Priority: Medium 

  Current Visit: Yes   





(2) Small bowel obstruction


SNOMED Code(s): 480777467


   Code(s): K56.609 - UNSP INTESTNL OBST, UNSP AS TO PARTIAL VERSUS COMPLETE 

OBST   Priority: High   Current Visit: Yes   





(3) Urinary tract infection


SNOMED Code(s): 79544622


   Code(s): N39.0 - URINARY TRACT INFECTION, SITE NOT SPECIFIED   Priority: 

High   Current Visit: Yes   


Qualifiers: 


   Urinary tract infection type: site unspecified   Hematuria presence: with 

hematuria   Qualified Code(s): N39.0 - Urinary tract infection, site not 

specified; R31.9 - Hematuria, unspecified   


Problem List Initiated/Reviewed/Updated: Yes


My Orders Last 24 Hours: 





80 y/o lady wiht NGT in place for postoperative SBO vs. malignant obstruction 

vs. ileus. Pt voiced desire to avoid more surgery if possible.


- Continue NGT to LIS. May have lozenges for comfort


- encourage ambulation 2-3 x per day (or more as pt desires)


- Monitor daily BMP, mg and phos 


- AROBF


- GI Ppx while NGT in place


- medical management per primary





Will follow





Aissatou Swanson MD


General surgery


Requesting Provider: VINOD Holliday


Date Consult Requested: 03/29/20


Patient History Reviewed: Yes


Admission H&P Reviewed: Yes


Notified Requestor: Yes

## 2020-03-29 NOTE — PCM.PN
- General Info


Date of Service: 03/29/20


Admission Dx/Problem (Free Text): 


 Admission Diagnosis/Problem





Admission Diagnosis/Problem      Urinary tract infection








Functional Status: Reports: Pain Controlled, Ambulating, Urinating, New 

Symptoms.  Denies: Tolerating Diet (NPO), Incentive Spirometry





- Review of Systems


General: Reports: Weakness.  Denies: Fever, Fatigue, Malaise, Chills


HEENT: Reports: No Symptoms.  Denies: Headaches, Sore Throat


Pulmonary: Reports: No Symptoms.  Denies: Shortness of Breath, Cough, Sputum, 

Wheezing


Cardiovascular: Reports: No Symptoms.  Denies: Chest Pain, Palpitations, 

Dyspnea on Exertion


Gastrointestinal: Reports: Abdominal Pain (mild), Constipation, Decreased 

Appetite.  Denies: Diarrhea, Flatus, Nausea, Vomiting


Genitourinary: Reports: No Symptoms


Musculoskeletal: Reports: No Symptoms


Skin: Reports: No Symptoms.  Denies: Cyanosis


Neurological: Reports: No Symptoms.  Denies: Confusion


Psychiatric: Reports: No Symptoms





- Patient Data


Vitals - Most Recent: 


 Last Vital Signs











Temp  97.9 F   03/29/20 08:43


 


Pulse  90   03/29/20 08:43


 


Resp  18   03/29/20 08:43


 


BP  129/89   03/29/20 08:43


 


Pulse Ox  95   03/29/20 08:43











Weight - Most Recent: 237 lb


I&O - Last 24 Hours: 


 Intake & Output











 03/28/20 03/29/20 03/29/20





 22:59 06:59 14:59


 


Intake Total 600 300 


 


Output Total 400 300 


 


Balance 200 0 











Lab Results Last 24 Hours: 


 Laboratory Results - last 24 hr











  03/29/20 03/29/20 Range/Units





  05:35 05:35 


 


WBC  10.96 H   (3.98-10.04)  K/mm3


 


RBC  4.02   (3.98-5.22)  M/mm3


 


Hgb  12.0   (11.2-15.7)  gm/dl


 


Hct  38.8   (34.1-44.9)  %


 


MCV  96.5 H   (79.4-94.8)  fl


 


MCH  29.9   (25.6-32.2)  pg


 


MCHC  30.9 L   (32.2-35.5)  g/dl


 


RDW Std Deviation  46.6 H   (36.4-46.3)  fL


 


Plt Count  439 H   (182-369)  K/mm3


 


MPV  9.9   (9.4-12.3)  fl


 


Neut % (Auto)  61.7   (34.0-71.1)  %


 


Lymph % (Auto)  21.4   (19.3-51.7)  %


 


Mono % (Auto)  13.2 H   (4.7-12.5)  %


 


Eos % (Auto)  2.4   (0.7-5.8)  


 


Baso % (Auto)  0.3   (0.1-1.2)  %


 


Neut # (Auto)  6.77 H   (1.56-6.13)  K/mm3


 


Lymph # (Auto)  2.34   (1.18-3.74)  K/mm3


 


Mono # (Auto)  1.45 H   (0.24-0.36)  K/mm3


 


Eos # (Auto)  0.26   (0.04-0.36)  K/mm3


 


Baso # (Auto)  0.03   (0.01-0.08)  K/mm3


 


Manual Slide Review  Normal smear   


 


Sodium   143  (136-145)  mEq/L


 


Potassium   4.1  (3.5-5.1)  mEq/L


 


Chloride   106  ()  mEq/L


 


Carbon Dioxide   24  (21-32)  mEq/L


 


Anion Gap   17.1 H  (5-15)  


 


BUN   20 H  (7-18)  mg/dL


 


Creatinine   1.2 H  (0.55-1.02)  mg/dL


 


Est Cr Clr Drug Dosing   33.65  mL/min


 


Estimated GFR (MDRD)   43  (>60)  mL/min


 


BUN/Creatinine Ratio   16.7  (14-18)  


 


Glucose   99  ()  mg/dL


 


Calcium   8.7  (8.5-10.1)  mg/dL


 


Magnesium   2.1  (1.8-2.4)  mg/dl











Saúl Results Last 24 Hours: 


 Microbiology











 03/26/20 15:18 Aerobic Blood Culture - Preliminary





 Blood - Venous    NO GROWTH AFTER 2 DAYS





 Anaerobic Blood Culture - Preliminary





    NO GROWTH AFTER 2 DAYS


 


 03/26/20 15:35 Aerobic Blood Culture - Preliminary





 Blood - Venous - Lab Draw    NO GROWTH AFTER 2 DAYS





 Anaerobic Blood Culture - Preliminary





    NO GROWTH AFTER 2 DAYS


 


 03/26/20 14:30 Urine Culture - Final





 Urine, Clean Catch    Klebsiella Oxytoca











Med Orders - Current: 


 Current Medications





Acetaminophen (Tylenol)  650 mg PO Q4H PRN


   PRN Reason: Pain (Mild 1-3)/fever


   Last Admin: 03/28/20 05:07 Dose:  650 mg


Apixaban (Eliquis)  5 mg PO BID [facility]


   Last Admin: 03/29/20 08:45 Dose:  5 mg


Calcium Polycarbophil (Fibercon)  1,250 mg PO DAILY [facility]


   Last Admin: 03/29/20 08:45 Dose:  1,250 mg


Cefdinir (Omnicef)  300 mg PO BID [facility]


   Last Admin: 03/29/20 08:45 Dose:  300 mg


Famotidine (Pepcid)  20 mg PO BID [facility]


   Last Admin: 03/29/20 08:45 Dose:  20 mg


Magnesium Hydroxide (Milk Of Magnesia)  30 ml PO BID PRN


   PRN Reason: Constipation


   Last Admin: 03/29/20 05:17 Dose:  30 ml


Melatonin (Melatonin)  6 mg PO BEDTIME PRN


   PRN Reason: Sleep


   Last Admin: 03/28/20 20:48 Dose:  6 mg


Miscellaneous Information (Remove Patch)  1 ea TRDERM Q72H [facility]


Multivitamins (Thera)  1 each PO DAILY [facility]


   Last Admin: 03/29/20 08:46 Dose:  Not Given


Ondansetron HCl (Zofran)  4 mg IV Q6H PRN


   PRN Reason: Nausea/Vomiting


Ondansetron HCl (Zofran Odt)  4 mg PO Q12H [facility]


   Last Admin: 03/29/20 00:47 Dose:  4 mg


Scopolamine (Transderm-Scop)  1.5 mg TRDERM Q72H [facility]


   Last Admin: 03/26/20 20:45 Dose:  1.5 mg


Senna/Docusate Sodium (Senna Plus)  1 tab PO BID [facility]


   Last Admin: 03/29/20 08:45 Dose:  1 tab


Simethicone (Simethicone)  80 mg PO QID PRN


   PRN Reason: bloating 


   Last Admin: 03/29/20 08:58 Dose:  80 mg


Sodium Chloride (Saline Flush)  10 ml FLUSH ASDIRECTED PRN


   PRN Reason: Keep Vein Open


   Last Admin: 03/26/20 12:18 Dose:  10 ml


Tramadol HCl (Ultram)  50 mg PO Q6H PRN


   PRN Reason: Pain


   Last Admin: 03/28/20 20:49 Dose:  50 mg





Discontinued Medications





Al Hydroxide/Mg Hydroxide (Mag-Al Plus)  30 ml PO ONETIME ONE


   Stop: 03/26/20 13:42


   Last Admin: 03/26/20 13:47 Dose:  30 ml


Famotidine (Pepcid)  20 mg IVPUSH ONETIME ONE


   Stop: 03/26/20 14:33


   Last Admin: 03/26/20 14:38 Dose:  20 mg


Famotidine (Pepcid)  20 mg PO DAILY [facility]


   Last Admin: 03/28/20 08:14 Dose:  20 mg


Sodium Chloride (Normal Saline)  1,000 mls @ 999 mls/hr IV ASDIRECTED [facility]


   Last Admin: 03/26/20 12:18 Dose:  150 mls/hr


Ceftriaxone Sodium 1 gm/ (Sodium Chloride)  100 mls @ 200 mls/hr IV Q24H [facility]


   Last Admin: 03/27/20 14:58 Dose:  200 mls/hr


Sodium Chloride (Normal Saline)  [facility],000 mls [facility]


   Stop: 03/27/20 05:49


   Last Admin: 03/26/20 16:47 Dose:  75 mls/hr


Promethazine HCl 25 mg/ Sodium (Chloride)  51 mls @ 100 mls/hr IV ONETIME ONE


   Stop: 03/26/20 18:09


   Last Admin: 03/26/20 18:32 Dose:  100 mls/hr


Promethazine HCl 25 mg/ Sodium (Chloride)  51 mls @ 100 mls/hr IV Q6H PRN


   PRN Reason: Nausea/Vomiting


Sodium Chloride (Normal Saline)  1,000 mls @ 100 mls/hr IV ASDIRECTED [facility]


   Stop: 03/28/20 17:29


   Last Admin: 03/27/20 17:44 Dose:  100 mls/hr


Ondansetron HCl (Zofran)  4 mg IVPUSH ONETIME ONE


   Stop: 03/26/20 12:09


   Last Admin: 03/26/20 12:18 Dose:  4 mg


Ondansetron HCl (Zofran)  4 mg IVPUSH ONETIME ONE


   Stop: 03/26/20 14:31


   Last Admin: 03/26/20 14:38 Dose:  4 mg


Senna/Docusate Sodium (Senna Plus)  1 tab PO BID PRN


   PRN Reason: Constipation


   Last Admin: 03/28/20 05:07 Dose:  1 tab











- Exam


Quality Assessment: DVT Prophylaxis


General: Alert, Oriented, Cooperative, No Acute Distress


HEENT: Pupils Equal, Pupils Reactive, Mucous Membr. Moist/Pink


Neck: Supple, Trachea Midline


Lungs: Clear to Auscultation, Normal Respiratory Effort


Cardiovascular: Regular Rate, Regular Rhythm


GI/Abdominal Exam: Distended, Tender (Around surgical site and LUQ), Abnormal 

Bowel Sounds (absent ).  No: Rigid


 (Female) Exam: Deferred


Back Exam: Normal Inspection, Full Range of Motion


Extremities: Normal Inspection, Normal Range of Motion, Non-Tender, No Pedal 

Edema, Normal Capillary Refill


Skin: Warm, Dry, Intact


Wound/Incisions: Healing Well, No Drainage.  No: Erythema


Neurological: No New Focal Deficit


Psy/Mental Status: Alert





Sepsis Event Note





- Evaluation


Sepsis Screening Result: No Definite Risk





- Focused Exam


Vital Signs: 


 Vital Signs











  Temp Pulse Resp BP Pulse Ox


 


 03/29/20 08:43  97.9 F  90  18  129/89  95


 


 03/29/20 05:20  97.5 F  91  20  135/80  90 L


 


 03/29/20 00:46  98.1 F  80  18  134/64  93 L











Date Exam was Performed: 03/29/20


Time Exam was Performed: 12:27





- Problem List & Annotations


(1) Nausea and vomiting


SNOMED Code(s): 98949786


   Code(s): R11.2 - NAUSEA WITH VOMITING, UNSPECIFIED   Status: Resolved   

Priority: High   Current Visit: Yes   


Qualifiers: 


   Vomiting type: unspecified   Vomiting Intractability: intractable   

Qualified Code(s): R11.2 - Nausea with vomiting, unspecified   





(2) History of ovarian cancer


Status: Chronic   Priority: Medium   Current Visit: No   





(3) Colon cancer


SNOMED Code(s): 762496885


   Code(s): C18.9 - MALIGNANT NEOPLASM OF COLON, UNSPECIFIED   Status: Acute   

Priority: High   Current Visit: Yes   


Qualifiers: 


   Colon location: unspecified part of colon   Qualified Code(s): C18.9 - 

Malignant neoplasm of colon, unspecified   





(4) Leukocytosis


SNOMED Code(s): 839994197, 327978924


   Code(s): D72.829 - ELEVATED WHITE BLOOD CELL COUNT, UNSPECIFIED   Status: 

Acute   Priority: High   Current Visit: Yes   


Qualifiers: 


   Leukocytosis type: unspecified   Qualified Code(s): D72.829 - Elevated white 

blood cell count, unspecified   





(5) Acute renal injury


SNOMED Code(s): 31638008, 41551624


   Code(s): N17.9 - ACUTE KIDNEY FAILURE, UNSPECIFIED   Status: Acute   Priority

: High   Current Visit: Yes   





(6) High anion gap metabolic acidosis


SNOMED Code(s): 22951827


   Code(s): E87.2 - ACIDOSIS   Status: Acute   Priority: High   Current Visit: 

Yes   





(7) History of venous thromboembolism


SNOMED Code(s): 372249327


   Code(s): Z86.718 - PERSONAL HISTORY OF OTHER VENOUS THROMBOSIS AND EMBOLISM 

  Status: Chronic   Priority: Medium   Current Visit: Yes   





(8) Chronic anticoagulation


SNOMED Code(s): 538964819


   Code(s): Z79.01 - LONG TERM (CURRENT) USE OF ANTICOAGULANTS   Status: 

Chronic   Priority: Low   Current Visit: Yes   





(9) HLD (hyperlipidemia)


SNOMED Code(s): 83789285


   Code(s): E78.5 - HYPERLIPIDEMIA, UNSPECIFIED   Status: Chronic   Priority: 

Low   Current Visit: No   


Qualifiers: 


   Hyperlipidemia type: unspecified   Qualified Code(s): E78.5 - Hyperlipidemia

, unspecified   





(10) GERD (gastroesophageal reflux disease)


SNOMED Code(s): 807829362


   Code(s): K21.9 - GASTRO-ESOPHAGEAL REFLUX DISEASE WITHOUT ESOPHAGITIS   

Status: Chronic   Priority: Medium   Current Visit: No   


Qualifiers: 


   Esophagitis presence: esophagitis presence not specified   Qualified Code(s)

: K21.9 - Gastro-esophageal reflux disease without esophagitis   





(11) Obesity


SNOMED Code(s): 662852700, 967905647


   Code(s): E66.9 - OBESITY, UNSPECIFIED   Status: Chronic   Priority: Medium   

Current Visit: No   


Qualifiers: 


   Obesity type: unspecified obesity type   Obesity classification: adult class 

2 (BMI 35 - 39.9)   Body mass index: BMI 38.0-38.9 





(12) Hematochezia


SNOMED Code(s): 792164613


   Code(s): K92.1 - MELENA   Status: Chronic   Priority: Medium   Current Visit

: Yes   





(13) Urinary tract infection


SNOMED Code(s): 38767088


   Code(s): N39.0 - URINARY TRACT INFECTION, SITE NOT SPECIFIED   Status: Acute

   Priority: High   Current Visit: Yes   


Qualifiers: 


   Urinary tract infection type: site unspecified   Hematuria presence: with 

hematuria   Qualified Code(s): N39.0 - Urinary tract infection, site not 

specified; R31.9 - Hematuria, unspecified   





(14) S/P laparotomy


SNOMED Code(s): 784963348, 723596473, 404610700


   Code(s): Z98.890 - OTHER SPECIFIED POSTPROCEDURAL STATES   Status: Acute   

Priority: Medium   Current Visit: Yes   





(15) Volume depletion


SNOMED Code(s): 30896704


   Code(s): E86.9 - VOLUME DEPLETION, UNSPECIFIED   Status: Acute   Current 

Visit: Yes   





(16) Chronic constipation


SNOMED Code(s): 174034618


   Code(s): K59.09 - OTHER CONSTIPATION   Status: Chronic   Priority: High   

Current Visit: Yes   





(17) Small bowel obstruction


SNOMED Code(s): 409732864


   Code(s): K56.609 - UNSP INTESTNL OBST, UNSP AS TO PARTIAL VERSUS COMPLETE 

OBST   Status: Acute   Priority: High   Current Visit: Yes   





- Problem List Review


Problem List Initiated/Reviewed/Updated: Yes





- My Orders


Last 24 Hours: 


My Active Orders





03/28/20 12:00


Cefdinir [Omnicef]   300 mg PO BID 


Ondansetron [Zofran ODT]   4 mg PO Q12H 


calcium polycarbophiL [Fibercon]   1,250 mg PO DAILY 





03/28/20 12:11


Ambulate [RC] QID 





03/28/20 14:27


Melatonin   6 mg PO BEDTIME PRN 





03/28/20 21:00


Docusate Sodium/Sennosides [Senna Plus]   1 tab PO BID 


Famotidine [Pepcid]   20 mg PO BID 





03/29/20 09:33


Abdomen 1V Flat [CR] Stat 





03/30/20 05:11


BASIC METABOLIC PANEL,BMP [CHEM] AM 


CBC WITH AUTO DIFF [HEME] AM 


MAGNESIUM [CHEM] AM 














- Plan


Plan:: 


I/P:





Acute:





SBO/Ileus


   -Last BM was 3/25/20


   -History of chronic constipation, laparotomy on 3/18/20 and discharged on 3/

20/20


   -Reported significant abdominal cancer, ovarian primary


   -Reported N/V prior to coming to ED; Nausea and emesis returned 3/29/20


   -Abdominal X-ray shows distended small bowel


   -NG tube placed


   -Low intermittent suction


   -NPO


   -Ambulate


   -Dr Burnett, general surgeon consulted


   -IV fluids as ordered


   -Q6hr blood glucose checks


   


Urinary tract infection


   -Reports hematuria


   -Recently underwent surgery at [facility] - martinez catheter placed during 

procedure


   -Denies any fever


   -WBC 17.18-->13.51-->11.48


   -CRP 5.3


   -UA: Cloudy, concentrated, 2+ protein, 2+ ketones, 3+ occult blood, positive 

nitrite, 1+ bilirubin, 1+ leukocyte esterase, 40-50 RBC, greater than 100 WBCs, 

moderate bacteria.


   -Urine culture: Pan sensitive Klebsiella


   -Blood cultures negative 


   -Sepsis criteria:


      -Apparent bacterial infection, No fever, WBC 17.18, No tachypnea, No 

tachycardia, Lactic acid 1.1


      -Does not meet sepsis criteria at this point


   -Started on Rocephin 1gm in ED - Resume Rocephin today due to SBO


   -IV fluids as indicated





Nausea and vomiting, 2/2 SBO


   -Reports history of significant post-operative nausea and vomiting


   -Has had symptoms since return from surgery


   -Given IV fluids and zofran after discharge with minimal brief improvement 

of symptoms


   -Zofran in ED with minimal improvement


   -Start Phenergan PRN -> discontinue, PRN zofran 


   -Scopolamine patch 


   -Monitor electrolytes - good so far


   -Vomited 3/29/20


   


S/P laparotomy 2/2 planned colon resection


   -Performed at [facility]


   -Daughter reports procedure aborted once she was opened due to significance 

of neoplasm


      -Planning chemotherapy in [place]


   -No signs of infection of surgical site


   -Continue to monitor 





Acute kidney injury, stable


   -Likely 2/2 dehydration from vomiting


   -BUN 27-->27-->22-->20


   -Creatinine 1.3-->1.2-->1.2


   -eGFR 39-->43


   -IV bolus given in ED


   -IV fluids as ordered





Volume depletion


   -TRINO as above


   -Concentrated urine


   -Anion gap 17.8-->15.4-->14.1-->17.1


   -IV fluids as indicated





Chronic:


prior VTE


HLD


chronic constipation


GERD


obesity


colon 


Hx/o ovarian cancer


S/P oophorectomy in 2007





Plan:


Admit to medical floor


Routine AM labs


Other orders as indicated above


Review home medications 


PT/OT


CM/SW for discharge planning


Dietician consult


DVT prophylaxis: Home Eliquis


Code status: Full Code


PCP: Dr. Jorge


Discharge plan: LOS >96 Hr due to new onset SBO

## 2020-03-29 NOTE — CR
Abdomen: Supine view of the abdomen was obtained.

 

Comparison: No prior abdominal imaging is available.

 

Dilated air-filled loops of small bowel are noted within the mid and 

left upper abdomen.  Inferior vena cava filter is noted.  Bony 

structures are osteopenic.  Mild scattered degenerative change is 

noted within the spine.

 

Impression:

1.  Dilated air-filled small bowel loops within the left mid and upper

 abdomen which is suspicious for proximal small bowel obstruction.

2.  Other findings which are believed to be nonacute as noted above.

 

Diagnostic code #3

 

Study was dictated in MDT

## 2020-03-30 NOTE — PCM.PN
- General Info


Date of Service: 03/30/20


Admission Dx/Problem (Free Text): 


 Admission Diagnosis/Problem





Admission Diagnosis/Problem      Urinary tract infection








Functional Status: Reports: Pain Controlled, Tolerating Diet, Ambulating, 

Urinating.  Denies: New Symptoms





- Review of Systems


General: Denies: Fever, Weakness, Fatigue, Malaise, Chills


HEENT: Reports: No Symptoms.  Denies: Headaches, Sore Throat


Pulmonary: Reports: No Symptoms.  Denies: Shortness of Breath, Pleuritic Chest 

Pain, Cough, Sputum, Hemoptysis, Wheezing


Cardiovascular: Reports: No Symptoms.  Denies: Chest Pain, Palpitations, 

Dyspnea on Exertion


Gastrointestinal: Reports: Constipation, Decreased Appetite.  Denies: Abdominal 

Pain, Diarrhea, Flatus, Nausea, Vomiting


Genitourinary: Reports: No Symptoms.  Denies: Pain


Musculoskeletal: Reports: No Symptoms


Skin: Reports: No Symptoms


Neurological: Reports: No Symptoms.  Denies: Confusion


Psychiatric: Reports: No Symptoms





- Patient Data


Vitals - Most Recent: 


 Last Vital Signs











Temp  98.4 F   03/30/20 09:16


 


Pulse  86   03/30/20 09:16


 


Resp  20   03/30/20 09:16


 


BP  127/68   03/30/20 09:16


 


Pulse Ox  92 L  03/30/20 09:16











Weight - Most Recent: 238 lb 1.6 oz


I&O - Last 24 Hours: 


 Intake & Output











 03/29/20 03/30/20 03/30/20





 22:59 06:59 14:59


 


Intake Total 755 1304 


 


Output Total 1200 350 


 


Balance -445 954 











Lab Results Last 24 Hours: 


 Laboratory Results - last 24 hr











  03/29/20 03/29/20 03/29/20 Range/Units





  12:59 18:23 23:47 


 


WBC     (3.98-10.04)  K/mm3


 


RBC     (3.98-5.22)  M/mm3


 


Hgb     (11.2-15.7)  gm/dl


 


Hct     (34.1-44.9)  %


 


MCV     (79.4-94.8)  fl


 


MCH     (25.6-32.2)  pg


 


MCHC     (32.2-35.5)  g/dl


 


RDW Std Deviation     (36.4-46.3)  fL


 


Plt Count     (182-369)  K/mm3


 


MPV     (9.4-12.3)  fl


 


Neut % (Auto)     (34.0-71.1)  %


 


Lymph % (Auto)     (19.3-51.7)  %


 


Mono % (Auto)     (4.7-12.5)  %


 


Eos % (Auto)     (0.7-5.8)  


 


Baso % (Auto)     (0.1-1.2)  %


 


Neut # (Auto)     (1.56-6.13)  K/mm3


 


Lymph # (Auto)     (1.18-3.74)  K/mm3


 


Mono # (Auto)     (0.24-0.36)  K/mm3


 


Eos # (Auto)     (0.04-0.36)  K/mm3


 


Baso # (Auto)     (0.01-0.08)  K/mm3


 


Manual Slide Review     


 


Sodium     (136-145)  mEq/L


 


Potassium     (3.5-5.1)  mEq/L


 


Chloride     ()  mEq/L


 


Carbon Dioxide     (21-32)  mEq/L


 


Anion Gap     (5-15)  


 


BUN     (7-18)  mg/dL


 


Creatinine     (0.55-1.02)  mg/dL


 


Est Cr Clr Drug Dosing     mL/min


 


Estimated GFR (MDRD)     (>60)  mL/min


 


BUN/Creatinine Ratio     (14-18)  


 


Glucose     ()  mg/dL


 


POC Glucose  111 H  133 H  148 H  ()  mg/dL


 


Calcium     (8.5-10.1)  mg/dL


 


Phosphorus     (2.6-4.7)  mg/dL


 


Magnesium     (1.8-2.4)  mg/dl


 


Total Bilirubin     (0.2-1.0)  mg/dL


 


AST     (15-37)  U/L


 


ALT     (14-59)  U/L


 


Alkaline Phosphatase     ()  U/L


 


Total Protein     (6.4-8.2)  g/dl


 


Albumin     (3.4-5.0)  g/dl


 


Globulin     gm/dL


 


Albumin/Globulin Ratio     (1-2)  














  03/30/20 03/30/20 03/30/20 Range/Units





  05:40 05:40 05:40 


 


WBC   10.69 H   (3.98-10.04)  K/mm3


 


RBC   3.94 L   (3.98-5.22)  M/mm3


 


Hgb   11.9   (11.2-15.7)  gm/dl


 


Hct   38.0   (34.1-44.9)  %


 


MCV   96.4 H   (79.4-94.8)  fl


 


MCH   30.2   (25.6-32.2)  pg


 


MCHC   31.3 L   (32.2-35.5)  g/dl


 


RDW Std Deviation   46.8 H   (36.4-46.3)  fL


 


Plt Count   430 H   (182-369)  K/mm3


 


MPV   9.9   (9.4-12.3)  fl


 


Neut % (Auto)   68.0   (34.0-71.1)  %


 


Lymph % (Auto)   15.5 L   (19.3-51.7)  %


 


Mono % (Auto)   13.4 H   (4.7-12.5)  %


 


Eos % (Auto)   1.6   (0.7-5.8)  


 


Baso % (Auto)   0.5   (0.1-1.2)  %


 


Neut # (Auto)   7.27 H   (1.56-6.13)  K/mm3


 


Lymph # (Auto)   1.66   (1.18-3.74)  K/mm3


 


Mono # (Auto)   1.43 H   (0.24-0.36)  K/mm3


 


Eos # (Auto)   0.17   (0.04-0.36)  K/mm3


 


Baso # (Auto)   0.05   (0.01-0.08)  K/mm3


 


Manual Slide Review   Abnormal smear   


 


Sodium  143    (136-145)  mEq/L


 


Potassium  4.4    (3.5-5.1)  mEq/L


 


Chloride  106    ()  mEq/L


 


Carbon Dioxide  27    (21-32)  mEq/L


 


Anion Gap  14.4    (5-15)  


 


BUN  16    (7-18)  mg/dL


 


Creatinine  1.2 H    (0.55-1.02)  mg/dL


 


Est Cr Clr Drug Dosing  33.65    mL/min


 


Estimated GFR (MDRD)  43    (>60)  mL/min


 


BUN/Creatinine Ratio  13.3 L    (14-18)  


 


Glucose  112    ()  mg/dL


 


POC Glucose     ()  mg/dL


 


Calcium  9.1    (8.5-10.1)  mg/dL


 


Phosphorus  4.2    (2.6-4.7)  mg/dL


 


Magnesium    2.1  (1.8-2.4)  mg/dl


 


Total Bilirubin  0.7    (0.2-1.0)  mg/dL


 


AST  180 H    (15-37)  U/L


 


ALT  133 H    (14-59)  U/L


 


Alkaline Phosphatase  95    ()  U/L


 


Total Protein  6.3 L    (6.4-8.2)  g/dl


 


Albumin  2.5 L    (3.4-5.0)  g/dl


 


Globulin  3.8    gm/dL


 


Albumin/Globulin Ratio  0.7 L    (1-2)  














  03/30/20 Range/Units





  06:35 


 


WBC   (3.98-10.04)  K/mm3


 


RBC   (3.98-5.22)  M/mm3


 


Hgb   (11.2-15.7)  gm/dl


 


Hct   (34.1-44.9)  %


 


MCV   (79.4-94.8)  fl


 


MCH   (25.6-32.2)  pg


 


MCHC   (32.2-35.5)  g/dl


 


RDW Std Deviation   (36.4-46.3)  fL


 


Plt Count   (182-369)  K/mm3


 


MPV   (9.4-12.3)  fl


 


Neut % (Auto)   (34.0-71.1)  %


 


Lymph % (Auto)   (19.3-51.7)  %


 


Mono % (Auto)   (4.7-12.5)  %


 


Eos % (Auto)   (0.7-5.8)  


 


Baso % (Auto)   (0.1-1.2)  %


 


Neut # (Auto)   (1.56-6.13)  K/mm3


 


Lymph # (Auto)   (1.18-3.74)  K/mm3


 


Mono # (Auto)   (0.24-0.36)  K/mm3


 


Eos # (Auto)   (0.04-0.36)  K/mm3


 


Baso # (Auto)   (0.01-0.08)  K/mm3


 


Manual Slide Review   


 


Sodium   (136-145)  mEq/L


 


Potassium   (3.5-5.1)  mEq/L


 


Chloride   ()  mEq/L


 


Carbon Dioxide   (21-32)  mEq/L


 


Anion Gap   (5-15)  


 


BUN   (7-18)  mg/dL


 


Creatinine   (0.55-1.02)  mg/dL


 


Est Cr Clr Drug Dosing   mL/min


 


Estimated GFR (MDRD)   (>60)  mL/min


 


BUN/Creatinine Ratio   (14-18)  


 


Glucose   ()  mg/dL


 


POC Glucose  100  ()  mg/dL


 


Calcium   (8.5-10.1)  mg/dL


 


Phosphorus   (2.6-4.7)  mg/dL


 


Magnesium   (1.8-2.4)  mg/dl


 


Total Bilirubin   (0.2-1.0)  mg/dL


 


AST   (15-37)  U/L


 


ALT   (14-59)  U/L


 


Alkaline Phosphatase   ()  U/L


 


Total Protein   (6.4-8.2)  g/dl


 


Albumin   (3.4-5.0)  g/dl


 


Globulin   gm/dL


 


Albumin/Globulin Ratio   (1-2)  











Saúl Results Last 24 Hours: 


 Microbiology











 03/26/20 15:18 Aerobic Blood Culture - Preliminary





 Blood - Venous    NO GROWTH AFTER 3 DAYS





 Anaerobic Blood Culture - Preliminary





    NO GROWTH AFTER 3 DAYS


 


 03/26/20 15:35 Aerobic Blood Culture - Preliminary





 Blood - Venous - Lab Draw    NO GROWTH AFTER 3 DAYS





 Anaerobic Blood Culture - Preliminary





    NO GROWTH AFTER 3 DAYS











Med Orders - Current: 


 Current Medications





Acetaminophen (Tylenol)  650 mg PO Q4H PRN


   PRN Reason: Pain (Mild 1-3)/fever


   Last Admin: 03/28/20 05:07 Dose:  650 mg


Benzocaine/Menthol (Cepacol Sore Throat)  1 lozenge MUCMEM Q2HR PRN


   PRN Reason: Sore Throat


Enoxaparin Sodium (Lovenox)  110 mg SUBCUT Q12H [facility]


   Last Admin: 03/30/20 10:32 Dose:  110 mg


Famotidine (Pepcid)  20 mg IVPUSH DAILY [facility]


   Last Admin: 03/30/20 10:35 Dose:  20 mg


Dextrose/Lactated Ringer's (Dextrose 5%-Lactated Ringers)  1,000 mls @ 150 mls/

hr IV ASDIRECTED [facility]


   Last Admin: 03/30/20 01:21 Dose:  150 mls/hr


Ceftriaxone Sodium 1 gm/ (Sodium Chloride)  100 mls @ 200 mls/hr IV Q24H [facility]


   Last Admin: 03/30/20 11:40 Dose:  200 mls/hr


Magnesium Hydroxide (Milk Of Magnesia)  30 ml PO BID PRN


   PRN Reason: Constipation


   Last Admin: 03/29/20 05:17 Dose:  30 ml


Melatonin (Melatonin)  6 mg PO BEDTIME PRN


   PRN Reason: Sleep


   Last Admin: 03/29/20 20:51 Dose:  6 mg


Miscellaneous Information (Remove Patch)  1 ea TRDERM Q72H [facility]


   Last Admin: 03/29/20 20:00 Dose:  1 ea


Ondansetron HCl (Zofran)  4 mg IV Q6H PRN


   PRN Reason: Nausea/Vomiting


   Last Admin: 03/29/20 23:02 Dose:  4 mg


Scopolamine (Transderm-Scop)  1.5 mg TRDERM Q72H [facility]


   Last Admin: 03/29/20 20:00 Dose:  1.5 mg


Senna/Docusate Sodium (Senna Plus)  1 tab PO BID [facility]


   Last Admin: 03/30/20 10:30 Dose:  1 tab


Simethicone (Simethicone)  80 mg PO QID PRN


   PRN Reason: bloating 


   Last Admin: 03/29/20 22:37 Dose:  80 mg


Sodium Chloride (Saline Flush)  10 ml FLUSH ASDIRECTED PRN


   PRN Reason: Keep Vein Open


   Last Admin: 03/26/20 12:18 Dose:  10 ml


Tramadol HCl (Ultram)  50 mg PO Q6H PRN


   PRN Reason: Pain


   Last Admin: 03/29/20 20:52 Dose:  50 mg





Discontinued Medications





Al Hydroxide/Mg Hydroxide (Mag-Al Plus)  30 ml PO ONETIME ONE


   Stop: 03/26/20 13:42


   Last Admin: 03/26/20 13:47 Dose:  30 ml


Apixaban (Eliquis)  5 mg PO BID [facility]


   Last Admin: 03/29/20 08:45 Dose:  5 mg


Calcium Polycarbophil (Fibercon)  1,250 mg PO DAILY [facility]


   Last Admin: 03/30/20 11:01 Dose:  Not Given


Cefdinir (Omnicef)  300 mg PO BID [facility]


   Last Admin: 03/29/20 08:45 Dose:  300 mg


Famotidine (Pepcid)  20 mg IVPUSH ONETIME ONE


   Stop: 03/26/20 14:33


   Last Admin: 03/26/20 14:38 Dose:  20 mg


Famotidine (Pepcid)  20 mg PO DAILY [facility]


   Last Admin: 03/28/20 08:14 Dose:  20 mg


Famotidine (Pepcid)  20 mg PO BID [facility]


   Last Admin: 03/29/20 08:45 Dose:  20 mg


Famotidine (Pepcid)  20 mg IVPUSH DAILY [facility]


Sodium Chloride (Normal Saline)  1,000 mls @ 999 mls/hr IV ASDIRECTED [facility]


   Last Admin: 03/26/20 12:18 Dose:  150 mls/hr


Ceftriaxone Sodium 1 gm/ (Sodium Chloride)  100 mls @ 200 mls/hr IV Q24H [facility]


   Last Admin: 03/27/20 14:58 Dose:  200 mls/hr


Sodium Chloride (Normal Saline)  1,000 mls @ 75 mls/hr IV ASDIRECTED [facility]


   Stop: 03/27/20 05:49


   Last Admin: 03/26/20 16:47 Dose:  75 mls/hr


Promethazine HCl 25 mg/ Sodium (Chloride)  51 mls @ 100 mls/hr IV ONETIME ONE


   Stop: 03/26/20 18:09


   Last Admin: 03/26/20 18:32 Dose:  100 mls/hr


Promethazine HCl 25 mg/ Sodium (Chloride)  51 mls @ 100 mls/hr IV Q6H PRN


   PRN Reason: Nausea/Vomiting


Sodium Chloride (Normal Saline)  1,000 mls @ 100 mls/hr IV ASDIRECTED [facility]


   Stop: 03/28/20 17:29


   Last Admin: 03/27/20 17:44 Dose:  100 mls/hr


Multivitamins (Thera)  1 each PO DAILY [facility]


   Last Admin: 03/29/20 08:46 Dose:  Not Given


Ondansetron HCl (Zofran)  4 mg IVPUSH ONETIME ONE


   Stop: 03/26/20 12:09


   Last Admin: 03/26/20 12:18 Dose:  4 mg


Ondansetron HCl (Zofran)  4 mg IVPUSH ONETIME ONE


   Stop: 03/26/20 14:31


   Last Admin: 03/26/20 14:38 Dose:  4 mg


Ondansetron HCl (Zofran Odt)  4 mg PO Q12H [facility]


   Last Admin: 03/29/20 00:47 Dose:  4 mg


Senna/Docusate Sodium (Senna Plus)  1 tab PO BID PRN


   PRN Reason: Constipation


   Last Admin: 03/28/20 05:07 Dose:  1 tab











- Exam


Quality Assessment: DVT Prophylaxis


General: Alert, Oriented, Cooperative, No Acute Distress


HEENT: Pupils Equal, Pupils Reactive, Mucous Membr. Moist/Pink


Neck: Supple, Trachea Midline


Lungs: Clear to Auscultation, Normal Respiratory Effort


Cardiovascular: Regular Rate, Regular Rhythm


GI/Abdominal Exam: Soft, No Distention, Tender (LUQ - mild tenderness ), 

Abnormal Bowel Sounds (weak but present)


 (Female) Exam: Deferred


Back Exam: Normal Inspection, Full Range of Motion


Extremities: Normal Inspection, Normal Range of Motion, Non-Tender, No Pedal 

Edema, Normal Capillary Refill


Peripheral Pulses: 2+: Radial (L), Radial (R), Dorsalis Pedis (L), Dorsalis 

Pedis (R)


Skin: Warm, Dry, Intact


Neurological: No New Focal Deficit


Psy/Mental Status: Alert, Normal Affect, Normal Mood





Sepsis Event Note





- Evaluation


Sepsis Screening Result: No Definite Risk





- Focused Exam


Vital Signs: 


 Vital Signs











  Temp Pulse Resp BP Pulse Ox


 


 03/30/20 09:16  98.4 F  86  20  127/68  92 L


 


 03/30/20 03:14  98.1 F  88  18  138/68  95











Date Exam was Performed: 03/30/20


Time Exam was Performed: 12:37





- Problem List & Annotations


(1) Nausea and vomiting


SNOMED Code(s): 26644000


   Code(s): R11.2 - NAUSEA WITH VOMITING, UNSPECIFIED   Status: Resolved   

Priority: High   Current Visit: Yes   


Qualifiers: 


   Vomiting type: unspecified   Vomiting Intractability: intractable   

Qualified Code(s): R11.2 - Nausea with vomiting, unspecified   





(2) History of ovarian cancer


Status: Chronic   Priority: Medium   Current Visit: No   





(3) Colon cancer


SNOMED Code(s): 251211840


   Code(s): C18.9 - MALIGNANT NEOPLASM OF COLON, UNSPECIFIED   Status: Acute   

Priority: High   Current Visit: Yes   


Qualifiers: 


   Colon location: unspecified part of colon   Qualified Code(s): C18.9 - 

Malignant neoplasm of colon, unspecified   





(4) Leukocytosis


SNOMED Code(s): 401327417, 557018661


   Code(s): D72.829 - ELEVATED WHITE BLOOD CELL COUNT, UNSPECIFIED   Status: 

Acute   Priority: High   Current Visit: Yes   


Qualifiers: 


   Leukocytosis type: unspecified   Qualified Code(s): D72.829 - Elevated white 

blood cell count, unspecified   





(5) Acute renal injury


SNOMED Code(s): 82221491, 81350346


   Code(s): N17.9 - ACUTE KIDNEY FAILURE, UNSPECIFIED   Status: Acute   Priority

: High   Current Visit: Yes   





(6) High anion gap metabolic acidosis


SNOMED Code(s): 01865956


   Code(s): E87.2 - ACIDOSIS   Status: Acute   Priority: High   Current Visit: 

Yes   





(7) History of venous thromboembolism


SNOMED Code(s): 597423163


   Code(s): Z86.718 - PERSONAL HISTORY OF OTHER VENOUS THROMBOSIS AND EMBOLISM 

  Status: Chronic   Priority: Medium   Current Visit: Yes   





(8) Chronic anticoagulation


SNOMED Code(s): 911293408


   Code(s): Z79.01 - LONG TERM (CURRENT) USE OF ANTICOAGULANTS   Status: 

Chronic   Priority: Low   Current Visit: Yes   





(9) HLD (hyperlipidemia)


SNOMED Code(s): 75843289


   Code(s): E78.5 - HYPERLIPIDEMIA, UNSPECIFIED   Status: Chronic   Priority: 

Low   Current Visit: No   


Qualifiers: 


   Hyperlipidemia type: unspecified   Qualified Code(s): E78.5 - Hyperlipidemia

, unspecified   





(10) GERD (gastroesophageal reflux disease)


SNOMED Code(s): 994761704


   Code(s): K21.9 - GASTRO-ESOPHAGEAL REFLUX DISEASE WITHOUT ESOPHAGITIS   

Status: Chronic   Priority: Medium   Current Visit: No   


Qualifiers: 


   Esophagitis presence: esophagitis presence not specified   Qualified Code(s)

: K21.9 - Gastro-esophageal reflux disease without esophagitis   





(11) Obesity


SNOMED Code(s): 208249095, 030053038


   Code(s): E66.9 - OBESITY, UNSPECIFIED   Status: Chronic   Priority: Medium   

Current Visit: No   


Qualifiers: 


   Obesity type: unspecified obesity type   Obesity classification: adult class 

2 (BMI 35 - 39.9)   Body mass index: BMI 38.0-38.9 





(12) Hematochezia


SNOMED Code(s): 295839961


   Code(s): K92.1 - MELENA   Status: Chronic   Priority: Medium   Current Visit

: Yes   





(13) Urinary tract infection


SNOMED Code(s): 21410782


   Code(s): N39.0 - URINARY TRACT INFECTION, SITE NOT SPECIFIED   Status: Acute

   Priority: High   Current Visit: Yes   


Qualifiers: 


   Urinary tract infection type: site unspecified   Hematuria presence: with 

hematuria   Qualified Code(s): N39.0 - Urinary tract infection, site not 

specified; R31.9 - Hematuria, unspecified   





(14) S/P laparotomy


SNOMED Code(s): 307259829, 243757430, 742563614


   Code(s): Z98.890 - OTHER SPECIFIED POSTPROCEDURAL STATES   Status: Acute   

Priority: Medium   Current Visit: Yes   





(15) Volume depletion


SNOMED Code(s): 77903982


   Code(s): E86.9 - VOLUME DEPLETION, UNSPECIFIED   Status: Acute   Current 

Visit: Yes   





(16) Chronic constipation


SNOMED Code(s): 595333035


   Code(s): K59.09 - OTHER CONSTIPATION   Status: Chronic   Priority: High   

Current Visit: Yes   





(17) Small bowel obstruction


SNOMED Code(s): 791017826


   Code(s): K56.609 - UNSP INTESTNL OBST, UNSP AS TO PARTIAL VERSUS COMPLETE 

OBST   Status: Acute   Priority: High   Current Visit: Yes   





- Problem List Review


Problem List Initiated/Reviewed/Updated: Yes





- My Orders


Last 24 Hours: 


My Active Orders





03/29/20 11:41


Blood Glucose Check, Bedside [RC] Q6HR 





03/29/20 21:00


Enoxaparin [Lovenox]   110 mg SUBCUT Q12H 





03/30/20 09:00


Famotidine [Pepcid]   20 mg IVPUSH DAILY 





03/31/20 05:11


CBC WITH AUTO DIFF [HEME] AM 


CMP [COMPREHENSIVE METABOLIC PN,CMP] [CHEM] AM 


MAGNESIUM [CHEM] AM 





04/01/20 05:11


CBC WITH AUTO DIFF [HEME] AM 


CMP [COMPREHENSIVE METABOLIC PN,CMP] [CHEM] AM 


MAGNESIUM [CHEM] AM 





04/02/20 05:11


CBC WITH AUTO DIFF [HEME] AM 


CMP [COMPREHENSIVE METABOLIC PN,CMP] [CHEM] AM 


MAGNESIUM [CHEM] AM 














- Plan


Plan:: 


I/P:





Acute:





SBO/Ileus


   -Last BM was 3/25/20


   -History of chronic constipation, laparotomy on 3/18/20 and discharged on 3/

20/20


   -Reported significant abdominal cancer, ovarian primary


   -Reported N/V prior to coming to ED; Nausea and emesis returned 3/29/20


   -Abdominal X-ray shows distended small bowel


   -NG tube placed


   -Low intermittent suction


   -NPO


   -Ambulate


   -Dr Burnett, general surgeon consulted


   -IV fluids as ordered


   -Q6hr blood glucose checks


   


Urinary tract infection


   -Reports hematuria


   -Recently underwent surgery at [facility] - martinez catheter placed during 

procedure


   -Denies any fever


   -WBC 17.18-->13.51-->11.48-->10.69


   -CRP 5.3


   -UA: Cloudy, concentrated, 2+ protein, 2+ ketones, 3+ occult blood, positive 

nitrite, 1+ bilirubin, 1+ leukocyte esterase, 40-50 RBC, greater than 100 WBCs, 

moderate bacteria.


   -Urine culture: Pan sensitive Klebsiella


   -Blood cultures negative 


   -Sepsis criteria:


      -Apparent bacterial infection, No fever, WBC 17.18, No tachypnea, No 

tachycardia, Lactic acid 1.1


      -Does not meet sepsis criteria at this point


   -Started on Rocephin 1gm in ED - Resume Rocephin today due to SBO


   -IV fluids as indicated





Nausea and vomiting, 2/2 SBO


   -Reports history of significant post-operative nausea and vomiting


   -Has had symptoms since return from surgery


   -Given IV fluids and zofran after discharge with minimal brief improvement 

of symptoms


   -Zofran in ED with minimal improvement


   -Start Phenergan PRN -> discontinue, PRN zofran 


   -Scopolamine patch 


   -Monitor electrolytes - good so far


   -Vomited 3/29/20


   


S/P laparotomy 2/2 planned colon resection


   -Performed at [facility]


   -Daughter reports procedure aborted once she was opened due to significance 

of neoplasm


      -Planning chemotherapy in [place]


   -No signs of infection of surgical site


   -Continue to monitor 





Acute kidney injury, stable


   -Likely 2/2 dehydration from vomiting


   -BUN 27-->27-->22-->20-->16


   -Creatinine 1.3-->1.2-->1.2-->1.2


   -eGFR 39-->43-->43-->43


   -IV bolus given in ED


   -IV fluids as ordered





Volume depletion


   -TRINO as above


   -Concentrated urine


   -Anion gap 17.8-->15.4-->14.1-->17.1-->14.4


   -IV fluids as indicated





Chronic:


prior VTE


HLD


chronic constipation


GERD


obesity


colon 


Hx/o ovarian cancer


S/P oophorectomy in 2007





Plan:


Admit to medical floor


Routine AM labs


Other orders as indicated above


Review home medications 


PT/OT


CM/SW for discharge planning


Dietician consult


DVT prophylaxis: Home Eliquis


Code status: Full Code


PCP: Dr. Patel


Discharge plan: LOS >96 Hr due to new onset SBO

## 2020-03-30 NOTE — PCM.CONSN
- General Info


Date of Service: 03/30/20


Subjective Update: 





The patient reports no specific complaints this morning.  Her nausea has 

resolved.  No flatus or bowel movement





- Patient Data


Vitals - Most Recent: 


 Last Vital Signs











Temp  36.9 C   03/30/20 09:16


 


Pulse  86   03/30/20 09:16


 


Resp  20   03/30/20 09:16


 


BP  127/68   03/30/20 09:16


 


Pulse Ox  92 L  03/30/20 09:16











Weight - Most Recent: 108 kg


I&O - Last 24 Hours: 


 Intake & Output











 03/29/20 03/30/20 03/30/20





 22:59 06:59 14:59


 


Intake Total 755 1304 


 


Output Total 1200 350 


 


Balance -445 954 











Lab Results Last 24 Hours: 


 Laboratory Results - last 24 hr











  03/29/20 03/29/20 03/29/20 Range/Units





  12:59 18:23 23:47 


 


WBC     (3.98-10.04)  K/mm3


 


RBC     (3.98-5.22)  M/mm3


 


Hgb     (11.2-15.7)  gm/dl


 


Hct     (34.1-44.9)  %


 


MCV     (79.4-94.8)  fl


 


MCH     (25.6-32.2)  pg


 


MCHC     (32.2-35.5)  g/dl


 


RDW Std Deviation     (36.4-46.3)  fL


 


Plt Count     (182-369)  K/mm3


 


MPV     (9.4-12.3)  fl


 


Neut % (Auto)     (34.0-71.1)  %


 


Lymph % (Auto)     (19.3-51.7)  %


 


Mono % (Auto)     (4.7-12.5)  %


 


Eos % (Auto)     (0.7-5.8)  


 


Baso % (Auto)     (0.1-1.2)  %


 


Neut # (Auto)     (1.56-6.13)  K/mm3


 


Lymph # (Auto)     (1.18-3.74)  K/mm3


 


Mono # (Auto)     (0.24-0.36)  K/mm3


 


Eos # (Auto)     (0.04-0.36)  K/mm3


 


Baso # (Auto)     (0.01-0.08)  K/mm3


 


Manual Slide Review     


 


Sodium     (136-145)  mEq/L


 


Potassium     (3.5-5.1)  mEq/L


 


Chloride     ()  mEq/L


 


Carbon Dioxide     (21-32)  mEq/L


 


Anion Gap     (5-15)  


 


BUN     (7-18)  mg/dL


 


Creatinine     (0.55-1.02)  mg/dL


 


Est Cr Clr Drug Dosing     mL/min


 


Estimated GFR (MDRD)     (>60)  mL/min


 


BUN/Creatinine Ratio     (14-18)  


 


Glucose     ()  mg/dL


 


POC Glucose  111 H  133 H  148 H  ()  mg/dL


 


Calcium     (8.5-10.1)  mg/dL


 


Phosphorus     (2.6-4.7)  mg/dL


 


Magnesium     (1.8-2.4)  mg/dl


 


Total Bilirubin     (0.2-1.0)  mg/dL


 


AST     (15-37)  U/L


 


ALT     (14-59)  U/L


 


Alkaline Phosphatase     ()  U/L


 


Total Protein     (6.4-8.2)  g/dl


 


Albumin     (3.4-5.0)  g/dl


 


Globulin     gm/dL


 


Albumin/Globulin Ratio     (1-2)  














  03/30/20 03/30/20 03/30/20 Range/Units





  05:40 05:40 05:40 


 


WBC   10.69 H   (3.98-10.04)  K/mm3


 


RBC   3.94 L   (3.98-5.22)  M/mm3


 


Hgb   11.9   (11.2-15.7)  gm/dl


 


Hct   38.0   (34.1-44.9)  %


 


MCV   96.4 H   (79.4-94.8)  fl


 


MCH   30.2   (25.6-32.2)  pg


 


MCHC   31.3 L   (32.2-35.5)  g/dl


 


RDW Std Deviation   46.8 H   (36.4-46.3)  fL


 


Plt Count   430 H   (182-369)  K/mm3


 


MPV   9.9   (9.4-12.3)  fl


 


Neut % (Auto)   68.0   (34.0-71.1)  %


 


Lymph % (Auto)   15.5 L   (19.3-51.7)  %


 


Mono % (Auto)   13.4 H   (4.7-12.5)  %


 


Eos % (Auto)   1.6   (0.7-5.8)  


 


Baso % (Auto)   0.5   (0.1-1.2)  %


 


Neut # (Auto)   7.27 H   (1.56-6.13)  K/mm3


 


Lymph # (Auto)   1.66   (1.18-3.74)  K/mm3


 


Mono # (Auto)   1.43 H   (0.24-0.36)  K/mm3


 


Eos # (Auto)   0.17   (0.04-0.36)  K/mm3


 


Baso # (Auto)   0.05   (0.01-0.08)  K/mm3


 


Manual Slide Review   Abnormal smear   


 


Sodium  143    (136-145)  mEq/L


 


Potassium  4.4    (3.5-5.1)  mEq/L


 


Chloride  106    ()  mEq/L


 


Carbon Dioxide  27    (21-32)  mEq/L


 


Anion Gap  14.4    (5-15)  


 


BUN  16    (7-18)  mg/dL


 


Creatinine  1.2 H    (0.55-1.02)  mg/dL


 


Est Cr Clr Drug Dosing  33.65    mL/min


 


Estimated GFR (MDRD)  43    (>60)  mL/min


 


BUN/Creatinine Ratio  13.3 L    (14-18)  


 


Glucose  112    ()  mg/dL


 


POC Glucose     ()  mg/dL


 


Calcium  9.1    (8.5-10.1)  mg/dL


 


Phosphorus  4.2    (2.6-4.7)  mg/dL


 


Magnesium    2.1  (1.8-2.4)  mg/dl


 


Total Bilirubin  0.7    (0.2-1.0)  mg/dL


 


AST  180 H    (15-37)  U/L


 


ALT  133 H    (14-59)  U/L


 


Alkaline Phosphatase  95    ()  U/L


 


Total Protein  6.3 L    (6.4-8.2)  g/dl


 


Albumin  2.5 L    (3.4-5.0)  g/dl


 


Globulin  3.8    gm/dL


 


Albumin/Globulin Ratio  0.7 L    (1-2)  














  03/30/20 Range/Units





  06:35 


 


WBC   (3.98-10.04)  K/mm3


 


RBC   (3.98-5.22)  M/mm3


 


Hgb   (11.2-15.7)  gm/dl


 


Hct   (34.1-44.9)  %


 


MCV   (79.4-94.8)  fl


 


MCH   (25.6-32.2)  pg


 


MCHC   (32.2-35.5)  g/dl


 


RDW Std Deviation   (36.4-46.3)  fL


 


Plt Count   (182-369)  K/mm3


 


MPV   (9.4-12.3)  fl


 


Neut % (Auto)   (34.0-71.1)  %


 


Lymph % (Auto)   (19.3-51.7)  %


 


Mono % (Auto)   (4.7-12.5)  %


 


Eos % (Auto)   (0.7-5.8)  


 


Baso % (Auto)   (0.1-1.2)  %


 


Neut # (Auto)   (1.56-6.13)  K/mm3


 


Lymph # (Auto)   (1.18-3.74)  K/mm3


 


Mono # (Auto)   (0.24-0.36)  K/mm3


 


Eos # (Auto)   (0.04-0.36)  K/mm3


 


Baso # (Auto)   (0.01-0.08)  K/mm3


 


Manual Slide Review   


 


Sodium   (136-145)  mEq/L


 


Potassium   (3.5-5.1)  mEq/L


 


Chloride   ()  mEq/L


 


Carbon Dioxide   (21-32)  mEq/L


 


Anion Gap   (5-15)  


 


BUN   (7-18)  mg/dL


 


Creatinine   (0.55-1.02)  mg/dL


 


Est Cr Clr Drug Dosing   mL/min


 


Estimated GFR (MDRD)   (>60)  mL/min


 


BUN/Creatinine Ratio   (14-18)  


 


Glucose   ()  mg/dL


 


POC Glucose  100  ()  mg/dL


 


Calcium   (8.5-10.1)  mg/dL


 


Phosphorus   (2.6-4.7)  mg/dL


 


Magnesium   (1.8-2.4)  mg/dl


 


Total Bilirubin   (0.2-1.0)  mg/dL


 


AST   (15-37)  U/L


 


ALT   (14-59)  U/L


 


Alkaline Phosphatase   ()  U/L


 


Total Protein   (6.4-8.2)  g/dl


 


Albumin   (3.4-5.0)  g/dl


 


Globulin   gm/dL


 


Albumin/Globulin Ratio   (1-2)  











[facility] Results Last 24 Hours: 


 Microbiology











 03/26/20 15:18 Aerobic Blood Culture - Preliminary





 Blood - Venous    NO GROWTH AFTER 3 DAYS





 Anaerobic Blood Culture - Preliminary





    NO GROWTH AFTER 3 DAYS


 


 03/26/20 15:35 Aerobic Blood Culture - Preliminary





 Blood - Venous - Lab Draw    NO GROWTH AFTER 3 DAYS





 Anaerobic Blood Culture - Preliminary





    NO GROWTH AFTER 3 DAYS











Med Orders - Current: 


 Current Medications





Acetaminophen (Tylenol)  650 mg PO Q4H PRN


   PRN Reason: Pain (Mild 1-3)/fever


   Last Admin: 03/28/20 05:07 Dose:  650 mg


Benzocaine/Menthol (Cepacol Sore Throat)  1 lozenge MUCMEM Q2HR PRN


   PRN Reason: Sore Throat


Enoxaparin Sodium (Lovenox)  110 mg SUBCUT Q12H [facility]


   Last Admin: 03/30/20 10:32 Dose:  110 mg


Famotidine (Pepcid)  20 mg IVPUSH DAILY [facility]


   Last Admin: 03/30/20 10:35 Dose:  20 mg


Dextrose/Lactated Ringer's (Dextrose 5%-Lactated Ringers)  1,000 mls @ 150 mls/

hr IV ASDIRECTED [facility]


   Last Admin: 03/30/20 01:21 Dose:  150 mls/hr


Ceftriaxone Sodium 1 gm/ (Sodium Chloride)  100 mls @ 200 mls/hr IV Q24H [facility]


   Last Admin: 03/29/20 12:18 Dose:  200 mls/hr


Magnesium Hydroxide (Milk Of Magnesia)  30 ml PO BID PRN


   PRN Reason: Constipation


   Last Admin: 03/29/20 05:17 Dose:  30 ml


Melatonin (Melatonin)  6 mg PO BEDTIME PRN


   PRN Reason: Sleep


   Last Admin: 03/29/20 20:51 Dose:  6 mg


Miscellaneous Information (Remove Patch)  1 ea TRDERM Q72H [facility]


   Last Admin: 03/29/20 20:00 Dose:  1 ea


Ondansetron HCl (Zofran)  4 mg IV Q6H PRN


   PRN Reason: Nausea/Vomiting


   Last Admin: 03/29/20 23:02 Dose:  4 mg


Scopolamine (Transderm-Scop)  1.5 mg TRDERM Q72H [facility]


   Last Admin: 03/29/20 20:00 Dose:  1.5 mg


Senna/Docusate Sodium (Senna Plus)  1 tab PO BID [facility]


   Last Admin: 03/30/20 10:30 Dose:  1 tab


Simethicone (Simethicone)  80 mg PO QID PRN


   PRN Reason: bloating 


   Last Admin: 03/29/20 22:37 Dose:  80 mg


Sodium Chloride (Saline Flush)  10 ml FLUSH ASDIRECTED PRN


   PRN Reason: Keep Vein Open


   Last Admin: 03/26/20 12:18 Dose:  10 ml


Tramadol HCl (Ultram)  50 mg PO Q6H PRN


   PRN Reason: Pain


   Last Admin: 03/29/20 20:52 Dose:  50 mg





Discontinued Medications





Al Hydroxide/Mg Hydroxide (Mag-Al Plus)  30 ml PO ONETIME ONE


   Stop: 03/26/20 13:42


   Last Admin: 03/26/20 13:47 Dose:  30 ml


Apixaban (Eliquis)  5 mg PO BID [facility]


   Last Admin: 03/29/20 08:45 Dose:  5 mg


Calcium Polycarbophil (Fibercon)  1,250 mg PO DAILY [facility]


   Last Admin: 03/29/20 08:45 Dose:  1,250 mg


Cefdinir (Omnicef)  300 mg PO BID [facility]


   Last Admin: 03/29/20 08:45 Dose:  300 mg


Famotidine (Pepcid)  20 mg IVPUSH ONETIME ONE


   Stop: 03/26/20 14:33


   Last Admin: 03/26/20 14:38 Dose:  20 mg


Famotidine (Pepcid)  20 mg PO DAILY [facility]


   Last Admin: 03/28/20 08:14 Dose:  20 mg


Famotidine (Pepcid)  20 mg PO BID [facility]


   Last Admin: 03/29/20 08:45 Dose:  20 mg


Famotidine (Pepcid)  20 mg IVPUSH DAILY [facility]


Sodium Chloride (Normal Saline)  1,000 mls @ 999 mls/hr IV ASDIRECTED [facility]


   Last Admin: 03/26/20 12:18 Dose:  150 mls/hr


Ceftriaxone Sodium 1 gm/ (Sodium Chloride)  100 mls @ 200 mls/hr IV Q24H [facility]


   Last Admin: 03/27/20 14:58 Dose:  200 mls/hr


Sodium Chloride (Normal Saline)  1,000 mls @ 75 mls/hr IV ASDIRECTED [facility]


   Stop: 03/27/20 05:49


   Last Admin: 03/26/20 16:47 Dose:  75 mls/hr


Promethazine HCl 25 mg/ Sodium (Chloride)  51 mls @ 100 mls/hr IV ONETIME ONE


   Stop: 03/26/20 18:09


   Last Admin: 03/26/20 18:32 Dose:  100 mls/hr


Promethazine HCl 25 mg/ Sodium (Chloride)  51 mls @ 100 mls/hr IV Q6H PRN


   PRN Reason: Nausea/Vomiting


Sodium Chloride (Normal Saline)  1,000 mls @ 100 mls/hr IV ASDIRECTED [facility]


   Stop: 03/28/20 17:29


   Last Admin: 03/27/20 17:44 Dose:  100 mls/hr


Multivitamins (Thera)  1 each PO DAILY [facility]


   Last Admin: 03/29/20 08:46 Dose:  Not Given


Ondansetron HCl (Zofran)  4 mg IVPUSH ONETIME ONE


   Stop: 03/26/20 12:09


   Last Admin: 03/26/20 12:18 Dose:  4 mg


Ondansetron HCl (Zofran)  4 mg IVPUSH ONETIME ONE


   Stop: 03/26/20 14:31


   Last Admin: 03/26/20 14:38 Dose:  4 mg


Ondansetron HCl (Zofran Odt)  4 mg PO Q12H [facility]


   Last Admin: 03/29/20 00:47 Dose:  4 mg


Senna/Docusate Sodium (Senna Plus)  1 tab PO BID PRN


   PRN Reason: Constipation


   Last Admin: 03/28/20 05:07 Dose:  1 tab











- Exam


Quality Assessment: No: Supplemental Oxygen


General: Alert


Lungs: Normal Respiratory Effort


GI/Abdominal Exam: Soft, Non-Tender, Other (mild firmness in the LUQ)





Sepsis Event Note





- Evaluation


Sepsis Screening Result: No Definite Risk





- Focused Exam


Vital Signs: 


 Vital Signs











  Temp Pulse Resp BP Pulse Ox


 


 03/30/20 09:16  36.9 C  86  20  127/68  92 L


 


 03/30/20 03:14  36.7 C  88  18  138/68  95


 


 03/29/20 23:43  36.6 C  86  16  134/60  96











Date Exam was Performed: 03/30/20


Time Exam was Performed: 10:56





Consult PN Assessment/Plan


(1) S/P laparotomy


SNOMED Code(s): 994924814, 708372712, 058596720


   Code(s): Z98.890 - OTHER SPECIFIED POSTPROCEDURAL STATES   Priority: Medium 

  Current Visit: Yes   





(2) Small bowel obstruction


SNOMED Code(s): 797138547


   Code(s): K56.609 - UNSP INTESTNL OBST, UNSP AS TO PARTIAL VERSUS COMPLETE 

OBST   Priority: High   Current Visit: Yes   





(3) Urinary tract infection


SNOMED Code(s): 88292607


   Code(s): N39.0 - URINARY TRACT INFECTION, SITE NOT SPECIFIED   Priority: 

High   Current Visit: Yes   


Qualifiers: 


   Urinary tract infection type: site unspecified   Hematuria presence: with 

hematuria   Qualified Code(s): N39.0 - Urinary tract infection, site not 

specified; R31.9 - Hematuria, unspecified   


Problem List Initiated/Reviewed/Updated: Yes


My Orders Last 24 Hours: 


My Active Orders





03/29/20 13:28


Benzocaine/Cetylpyrd/Menthol [Cepacol Sore Throat]   1 lozenge MUCMEM Q2HR PRN 





03/31/20 05:11


PHOSPHORUS [CHEM] AM 





04/01/20 05:11


PHOSPHORUS [CHEM] AM 





04/02/20 05:11


PHOSPHORUS [CHEM] AM 











Plan: 





- continue NGT to LIS


- Daily BMP, Mg, Phos


- ambulate TID


- AROBF


- medical management per primary








Aisstaou Swanson MD


General surgery

## 2020-03-31 NOTE — PCM.PN
- General Info


Date of Service: 03/31/20


Admission Dx/Problem (Free Text): 


 Admission Diagnosis/Problem





Admission Diagnosis/Problem      Urinary tract infection








Functional Status: Reports: Pain Controlled, Ambulating, Urinating.  Denies: 

Tolerating Diet (NPO), New Symptoms





- Review of Systems


General: Reports: No Symptoms.  Denies: Fever, Weakness, Fatigue, Malaise, 

Chills


HEENT: Reports: No Symptoms.  Denies: Headaches, Sore Throat


Pulmonary: Reports: No Symptoms.  Denies: Shortness of Breath, Cough, Sputum


Cardiovascular: Reports: No Symptoms.  Denies: Chest Pain, Palpitations, 

Dyspnea on Exertion


Gastrointestinal: Reports: Other (Feels much less "Bloated" today ).  Denies: 

Abdominal Pain, Constipation, Diarrhea, Flatus, Nausea, Vomiting


Genitourinary: Reports: No Symptoms.  Denies: Pain


Musculoskeletal: Reports: No Symptoms


Skin: Reports: No Symptoms.  Denies: Cyanosis


Neurological: Reports: No Symptoms.  Denies: Confusion


Psychiatric: Reports: No Symptoms





- Patient Data


Vitals - Most Recent: 


 Last Vital Signs











Temp  98.2 F   03/31/20 05:34


 


Pulse  80   03/31/20 05:34


 


Resp  16   03/31/20 05:34


 


BP  114/57 L  03/31/20 05:34


 


Pulse Ox  92 L  03/31/20 05:34











Weight - Most Recent: 240 lb 1.6 oz


I&O - Last 24 Hours: 


 Intake & Output











 03/30/20 03/31/20 03/31/20





 22:59 06:59 14:59


 


Intake Total 1077 1600 


 


Output Total 400 1000 


 


Balance 677 600 











Lab Results Last 24 Hours: 


 Laboratory Results - last 24 hr











  03/30/20 03/30/20 03/30/20 Range/Units





  13:23 18:20 22:59 


 


WBC     (3.98-10.04)  K/mm3


 


RBC     (3.98-5.22)  M/mm3


 


Hgb     (11.2-15.7)  gm/dl


 


Hct     (34.1-44.9)  %


 


MCV     (79.4-94.8)  fl


 


MCH     (25.6-32.2)  pg


 


MCHC     (32.2-35.5)  g/dl


 


RDW Std Deviation     (36.4-46.3)  fL


 


Plt Count     (182-369)  K/mm3


 


MPV     (9.4-12.3)  fl


 


Neut % (Auto)     (34.0-71.1)  %


 


Lymph % (Auto)     (19.3-51.7)  %


 


Mono % (Auto)     (4.7-12.5)  %


 


Eos % (Auto)     (0.7-5.8)  


 


Baso % (Auto)     (0.1-1.2)  %


 


Neut # (Auto)     (1.56-6.13)  K/mm3


 


Lymph # (Auto)     (1.18-3.74)  K/mm3


 


Mono # (Auto)     (0.24-0.36)  K/mm3


 


Eos # (Auto)     (0.04-0.36)  K/mm3


 


Baso # (Auto)     (0.01-0.08)  K/mm3


 


Sodium     (136-145)  mEq/L


 


Potassium     (3.5-5.1)  mEq/L


 


Chloride     ()  mEq/L


 


Carbon Dioxide     (21-32)  mEq/L


 


Anion Gap     (5-15)  


 


BUN     (7-18)  mg/dL


 


Creatinine     (0.55-1.02)  mg/dL


 


Est Cr Clr Drug Dosing     mL/min


 


Estimated GFR (MDRD)     (>60)  mL/min


 


BUN/Creatinine Ratio     (14-18)  


 


Glucose     ()  mg/dL


 


POC Glucose  106  124 H  123 H  ()  mg/dL


 


Calcium     (8.5-10.1)  mg/dL


 


Phosphorus     (2.6-4.7)  mg/dL


 


Magnesium     (1.8-2.4)  mg/dl


 


Total Bilirubin     (0.2-1.0)  mg/dL


 


AST     (15-37)  U/L


 


ALT     (14-59)  U/L


 


Alkaline Phosphatase     ()  U/L


 


Total Protein     (6.4-8.2)  g/dl


 


Albumin     (3.4-5.0)  g/dl


 


Globulin     gm/dL


 


Albumin/Globulin Ratio     (1-2)  














  03/31/20 03/31/20 03/31/20 Range/Units





  05:30 05:30 05:30 


 


WBC  11.14 H    (3.98-10.04)  K/mm3


 


RBC  3.61 L    (3.98-5.22)  M/mm3


 


Hgb  10.6 L    (11.2-15.7)  gm/dl


 


Hct  35.0    (34.1-44.9)  %


 


MCV  97.0 H    (79.4-94.8)  fl


 


MCH  29.4    (25.6-32.2)  pg


 


MCHC  30.3 L    (32.2-35.5)  g/dl


 


RDW Std Deviation  46.8 H    (36.4-46.3)  fL


 


Plt Count  399 H    (182-369)  K/mm3


 


MPV  9.9    (9.4-12.3)  fl


 


Neut % (Auto)  68.9    (34.0-71.1)  %


 


Lymph % (Auto)  15.6 L    (19.3-51.7)  %


 


Mono % (Auto)  12.3    (4.7-12.5)  %


 


Eos % (Auto)  2.2    (0.7-5.8)  


 


Baso % (Auto)  0.4    (0.1-1.2)  %


 


Neut # (Auto)  7.67 H    (1.56-6.13)  K/mm3


 


Lymph # (Auto)  1.74    (1.18-3.74)  K/mm3


 


Mono # (Auto)  1.37 H    (0.24-0.36)  K/mm3


 


Eos # (Auto)  0.25    (0.04-0.36)  K/mm3


 


Baso # (Auto)  0.04    (0.01-0.08)  K/mm3


 


Sodium   144   (136-145)  mEq/L


 


Potassium   4.0   (3.5-5.1)  mEq/L


 


Chloride   109 H   ()  mEq/L


 


Carbon Dioxide   26   (21-32)  mEq/L


 


Anion Gap   13.0   (5-15)  


 


BUN   16   (7-18)  mg/dL


 


Creatinine   1.1 H   (0.55-1.02)  mg/dL


 


Est Cr Clr Drug Dosing   36.70   mL/min


 


Estimated GFR (MDRD)   48   (>60)  mL/min


 


BUN/Creatinine Ratio   14.5   (14-18)  


 


Glucose   123 H   ()  mg/dL


 


POC Glucose    132 H  ()  mg/dL


 


Calcium   8.9   (8.5-10.1)  mg/dL


 


Phosphorus   3.5   (2.6-4.7)  mg/dL


 


Magnesium   2.1   (1.8-2.4)  mg/dl


 


Total Bilirubin   0.3   (0.2-1.0)  mg/dL


 


AST   96 H   (15-37)  U/L


 


ALT   124 H   (14-59)  U/L


 


Alkaline Phosphatase   92   ()  U/L


 


Total Protein   5.4 L   (6.4-8.2)  g/dl


 


Albumin   2.2 L   (3.4-5.0)  g/dl


 


Globulin   3.2   gm/dL


 


Albumin/Globulin Ratio   0.7 L   (1-2)  











Sa[facility]l Results Last 24 Hours: 


 Microbiology











 03/26/20 15:18 Aerobic Blood Culture - Preliminary





 Blood - Venous    Gram Variable Bacilli





 Anaerobic Blood Culture - Preliminary





    NO GROWTH AFTER 4 DAYS


 


 03/26/20 15:35 Aerobic Blood Culture - Preliminary





 Blood - Venous - Lab Draw    NO GROWTH AFTER 4 DAYS





 Anaerobic Blood Culture - Preliminary





    NO GROWTH AFTER 4 DAYS











Med Orders - Current: 


 Current Medications





Acetaminophen (Tylenol)  650 mg PO Q4H PRN


   PRN Reason: Pain (Mild 1-3)/fever


   Last Admin: 03/28/20 05:07 Dose:  650 mg


Benzocaine/Menthol (Cepacol Sore Throat)  1 lozenge MUCMEM Q2HR PRN


   PRN Reason: Sore Throat


Enoxaparin Sodium (Lovenox)  110 mg SUBCUT Q12H [facility]


   Last Admin: 03/31/20 08:14 Dose:  110 mg


Famotidine (Pepcid)  20 mg IVPUSH DAILY [facility]


   Last Admin: 03/31/20 08:18 Dose:  20 mg


Dextrose/Lactated Ringer's (Dextrose 5%-Lactated Ringers)  1,000 mls @ 150 mls/

hr IV ASDIRECTED [facility]


   Last Admin: 03/31/20 07:30 Dose:  150 mls/hr


Ceftriaxone Sodium 1 gm/ (Sodium Chloride)  100 mls @ 200 mls/hr IV Q24H [facility]


   Last Admin: 03/30/20 11:40 Dose:  200 mls/hr


Magnesium Hydroxide (Milk Of Magnesia)  30 ml PO BID PRN


   PRN Reason: Constipation


   Last Admin: 03/29/20 05:17 Dose:  30 ml


Melatonin (Melatonin)  6 mg PO BEDTIME PRN


   PRN Reason: Sleep


   Last Admin: 03/29/20 20:51 Dose:  6 mg


Miscellaneous Information (Remove Patch)  1 ea TRDERM Q72H [facility]


   Last Admin: 03/29/20 20:00 Dose:  1 ea


Ondansetron HCl (Zofran)  4 mg IV Q6H PRN


   PRN Reason: Nausea/Vomiting


   Last Admin: 03/29/20 23:02 Dose:  4 mg


Scopolamine (Transderm-Scop)  1.5 mg TRDERM Q72H [facility]


   Last Admin: 03/29/20 20:00 Dose:  1.5 mg


Senna/Docusate Sodium (Senna Plus)  1 tab PO BID [facility]


   Last Admin: 03/31/20 08:13 Dose:  1 tab


Simethicone (Simethicone)  80 mg PO QID PRN


   PRN Reason: bloating 


   Last Admin: 03/29/20 22:37 Dose:  80 mg


Sodium Chloride (Saline Flush)  10 ml FLUSH ASDIRECTED PRN


   PRN Reason: Keep Vein Open


   Last Admin: 03/26/20 12:18 Dose:  10 ml


Tramadol HCl (Ultram)  50 mg PO Q6H PRN


   PRN Reason: Pain


   Last Admin: 03/31/20 02:07 Dose:  50 mg





Discontinued Medications





Al Hydroxide/Mg Hydroxide (Mag-Al Plus)  30 ml PO ONETIME ONE


   Stop: 03/26/20 13:42


   Last Admin: 03/26/20 13:47 Dose:  30 ml


Apixaban (Eliquis)  5 mg PO BID [facility]


   Last Admin: 03/29/20 08:45 Dose:  5 mg


Calcium Polycarbophil (Fibercon)  1,250 mg PO DAILY [facility]


   Last Admin: 03/30/20 11:01 Dose:  Not Given


Cefdinir (Omnicef)  300 mg PO BID [facility]


   Last Admin: 03/29/20 08:45 Dose:  300 mg


Famotidine (Pepcid)  20 mg IVPUSH ONETIME ONE


   Stop: 03/26/20 14:33


   Last Admin: 03/26/20 14:38 Dose:  20 mg


Famotidine (Pepcid)  20 mg PO DAILY [facility]


   Last Admin: 03/28/20 08:14 Dose:  20 mg


Famotidine (Pepcid)  20 mg PO BID [facility]


   Last Admin: 03/29/20 08:45 Dose:  20 mg


Famotidine (Pepcid)  20 mg IVPUSH DAILY [facility]


Sodium Chloride (Normal Saline)  1,000 mls @ 999 mls/hr IV ASDIRECTED [facility]


   Last Admin: 03/26/20 12:18 Dose:  150 mls/hr


Ceftriaxone Sodium 1 gm/ (Sodium Chloride)  100 mls @ 200 mls/hr IV Q24H [facility]


   Last Admin: 03/27/20 14:58 Dose:  200 mls/hr


Sodium Chloride (Normal Saline)  1,000 mls @ 75 mls/hr IV ASDIRECTED [facility]


   Stop: 03/27/20 05:49


   Last Admin: 03/26/20 16:47 Dose:  75 mls/hr


Promethazine HCl 25 mg/ Sodium (Chloride)  51 mls @ 100 mls/hr IV ONETIME ONE


   Stop: 03/26/20 18:09


   Last Admin: 03/26/20 18:32 Dose:  100 mls/hr


Promethazine HCl 25 mg/ Sodium (Chloride)  51 mls @ 100 mls/hr IV Q6H PRN


   PRN Reason: Nausea/Vomiting


Sodium Chloride (Normal Saline)  1,000 mls @ 100 mls/hr IV ASDIRECTED [facility]


   Stop: 03/28/20 17:29


   Last Admin: 03/27/20 17:44 Dose:  100 mls/hr


Multivitamins (Thera)  1 each PO DAILY [facility]


   Last Admin: 03/29/20 08:46 Dose:  Not Given


Ondansetron HCl (Zofran)  4 mg IVPUSH ONETIME ONE


   Stop: 03/26/20 12:09


   Last Admin: 03/26/20 12:18 Dose:  4 mg


Ondansetron HCl (Zofran)  4 mg IVPUSH ONETIME ONE


   Stop: 03/26/20 14:31


   Last Admin: 03/26/20 14:38 Dose:  4 mg


Ondansetron HCl (Zofran Odt)  4 mg PO Q12H [facility]


   Last Admin: 03/29/20 00:47 Dose:  4 mg


Senna/Docusate Sodium (Senna Plus)  1 tab PO BID PRN


   PRN Reason: Constipation


   Last Admin: 03/28/20 05:07 Dose:  1 tab











- Exam


Quality Assessment: DVT Prophylaxis


General: Alert, Oriented, Cooperative, No Acute Distress


HEENT: Pupils Equal, Pupils Reactive, Mucous Membr. Moist/Pink


Neck: Supple, Trachea Midline


Lungs: Clear to Auscultation, Normal Respiratory Effort


Cardiovascular: Regular Rate, Regular Rhythm


GI/Abdominal Exam: Soft, Non-Tender, No Distention, Abnormal Bowel Sounds (

hypoactive but present )


 (Female) Exam: Deferred


Back Exam: Normal Inspection, Full Range of Motion


Extremities: Normal Inspection, Normal Range of Motion, Non-Tender, No Pedal 

Edema, Normal Capillary Refill


Peripheral Pulses: 2+: Radial (L), Radial (R), Dorsalis Pedis (L), Dorsalis 

Pedis (R)


Skin: Warm, Dry, Intact


Neurological: No New Focal Deficit


Psy/Mental Status: Alert, Normal Affect, Normal Mood





Sepsis Event Note





- Evaluation


Sepsis Screening Result: No Definite Risk





- Focused Exam


Vital Signs: 


 Vital Signs











  Temp Pulse Resp BP Pulse Ox


 


 03/31/20 05:34  98.2 F  80  16  114/57 L  92 L


 


 03/31/20 00:43  97.9 F  77  20  123/69  93 L











Date Exam was Performed: 03/31/20


Time Exam was Performed: 16:27





- Problem List & Annotations


(1) Nausea and vomiting


SNOMED Code(s): 49233378


   Code(s): R11.2 - NAUSEA WITH VOMITING, UNSPECIFIED   Status: Resolved   

Priority: High   Current Visit: Yes   


Qualifiers: 


   Vomiting type: unspecified   Vomiting Intractability: intractable   

Qualified Code(s): R11.2 - Nausea with vomiting, unspecified   





(2) History of ovarian cancer


Status: Chronic   Priority: Medium   Current Visit: No   





(3) Colon cancer


SNOMED Code(s): 924002645


   Code(s): C18.9 - MALIGNANT NEOPLASM OF COLON, UNSPECIFIED   Status: Acute   

Priority: High   Current Visit: Yes   


Qualifiers: 


   Colon location: unspecified part of colon   Qualified Code(s): C18.9 - 

Malignant neoplasm of colon, unspecified   





(4) Leukocytosis


SNOMED Code(s): 560287118, 502912602


   Code(s): D72.829 - ELEVATED WHITE BLOOD CELL COUNT, UNSPECIFIED   Status: 

Acute   Priority: High   Current Visit: Yes   


Qualifiers: 


   Leukocytosis type: unspecified   Qualified Code(s): D72.829 - Elevated white 

blood cell count, unspecified   





(5) Acute renal injury


SNOMED Code(s): 08675096, 17053247


   Code(s): N17.9 - ACUTE KIDNEY FAILURE, UNSPECIFIED   Status: Acute   Priority

: High   Current Visit: Yes   





(6) High anion gap metabolic acidosis


SNOMED Code(s): 19826296


   Code(s): E87.2 - ACIDOSIS   Status: Acute   Priority: High   Current Visit: 

Yes   





(7) History of venous thromboembolism


SNOMED Code(s): 498645259


   Code(s): Z86.718 - PERSONAL HISTORY OF OTHER VENOUS THROMBOSIS AND EMBOLISM 

  Status: Chronic   Priority: Medium   Current Visit: Yes   





(8) Chronic anticoagulation


SNOMED Code(s): 658315413


   Code(s): Z79.01 - LONG TERM (CURRENT) USE OF ANTICOAGULANTS   Status: 

Chronic   Priority: Low   Current Visit: Yes   





(9) HLD (hyperlipidemia)


SNOMED Code(s): 81886107


   Code(s): E78.5 - HYPERLIPIDEMIA, UNSPECIFIED   Status: Chronic   Priority: 

Low   Current Visit: No   


Qualifiers: 


   Hyperlipidemia type: unspecified   Qualified Code(s): E78.5 - Hyperlipidemia

, unspecified   





(10) GERD (gastroesophageal reflux disease)


SNOMED Code(s): 045624860


   Code(s): K21.9 - GASTRO-ESOPHAGEAL REFLUX DISEASE WITHOUT ESOPHAGITIS   

Status: Chronic   Priority: Medium   Current Visit: No   


Qualifiers: 


   Esophagitis presence: esophagitis presence not specified   Qualified Code(s)

: K21.9 - Gastro-esophageal reflux disease without esophagitis   





(11) Obesity


SNOMED Code(s): 838333097, 252523340


   Code(s): E66.9 - OBESITY, UNSPECIFIED   Status: Chronic   Priority: Medium   

Current Visit: No   


Qualifiers: 


   Obesity type: unspecified obesity type   Obesity classification: adult class 

2 (BMI 35 - 39.9)   Body mass index: BMI 38.0-38.9 





(12) Hematochezia


SNOMED Code(s): 394522956


   Code(s): K92.1 - MELENA   Status: Chronic   Priority: Medium   Current Visit

: Yes   





(13) Urinary tract infection


SNOMED Code(s): 99229760


   Code(s): N39.0 - URINARY TRACT INFECTION, SITE NOT SPECIFIED   Status: Acute

   Priority: High   Current Visit: Yes   


Qualifiers: 


   Urinary tract infection type: site unspecified   Hematuria presence: with 

hematuria   Qualified Code(s): N39.0 - Urinary tract infection, site not 

specified; R31.9 - Hematuria, unspecified   





(14) S/P laparotomy


SNOMED Code(s): 981931330, 698437646, 339233313


   Code(s): Z98.890 - OTHER SPECIFIED POSTPROCEDURAL STATES   Status: Acute   

Priority: Medium   Current Visit: Yes   





(15) Volume depletion


SNOMED Code(s): 419976450


   Code(s): E86.9 - VOLUME DEPLETION, UNSPECIFIED   Status: Acute   Current 

Visit: Yes   





(16) Chronic constipation


SNOMED Code(s): 429408466


   Code(s): K59.09 - OTHER CONSTIPATION   Status: Chronic   Priority: High   

Current Visit: Yes   





(17) Small bowel obstruction


SNOMED Code(s): 970440962


   Code(s): K56.609 - UNSP INTESTNL OBST, UNSP AS TO PARTIAL VERSUS COMPLETE 

OBST   Status: Acute   Priority: High   Current Visit: Yes   





- Problem List Review


Problem List Initiated/Reviewed/Updated: Yes





- My Orders


Last 24 Hours: 


My Active Orders





03/30/20 09:00


Famotidine [Pepcid]   20 mg IVPUSH DAILY 





04/01/20 05:11


CMP [COMPREHENSIVE METABOLIC PN,CMP] [CHEM] AM 














- Plan


Plan:: 


I/P:





Acute:





SBO/Ileus


   -Last BM was 3/25/20


   -History of chronic constipation, laparotomy on 3/18/20 and discharged on 3/

20/20


   -Reported significant abdominal cancer, ovarian primary


   -Reported N/V prior to coming to ED; Nausea and emesis returned 3/29/20


   -Abdominal X-ray shows distended small bowel


   -NG tube placed


   -Low intermittent suction


   -NPO


   -Ambulate


   -Dr Burnett, general surgeon consulted


   -IV fluids as ordered


   -Q6hr blood glucose checks


   


Urinary tract infection


   -Reports hematuria


   -Recently underwent surgery at [facility] - martinez catheter placed during 

procedure


   -Denies any fever


   -WBC 17.18-->13.51-->11.48-->10.69-->11.14


   -CRP 5.3


   -UA: Cloudy, concentrated, 2+ protein, 2+ ketones, 3+ occult blood, positive 

nitrite, 1+ bilirubin, 1+ leukocyte esterase, 40-50 RBC, greater than 100 WBCs, 

moderate bacteria.


   -Urine culture: Pan sensitive Klebsiella


   -Blood cultures negative 


   -Sepsis criteria:


      -Apparent bacterial infection, No fever, WBC 17.18, No tachypnea, No 

tachycardia, Lactic acid 1.1


      -Does not meet sepsis criteria at this point


   -Started on Rocephin 1gm in ED - Resume Rocephin today due to SBO


   -IV fluids as indicated





Nausea and vomiting, 2/2 SBO


   -Reports history of significant post-operative nausea and vomiting


   -Has had symptoms since return from surgery


   -Given IV fluids and zofran after discharge with minimal brief improvement 

of symptoms


   -Zofran in ED with minimal improvement


   -Start Phenergan PRN -> discontinue, PRN zofran 


   -Scopolamine patch 


   -Monitor electrolytes - good so far


   -Vomited 3/29/20


   


S/P laparotomy 2/2 planned colon resection


   -Performed at [facility]


   -Daughter reports procedure aborted once she was opened due to significance 

of neoplasm


      -Planning chemotherapy in [place]


   -No signs of infection of surgical site


   -Continue to monitor 





Acute kidney injury, stable


   -Likely 2/2 dehydration from vomiting


   -BUN 27-->27-->22-->20-->16-->16


   -Creatinine 1.3-->1.2-->1.2-->1.2-->1.1


   -eGFR 39-->43-->43-->43-->48


   -IV bolus given in ED


   -IV fluids as ordered





Volume depletion


   -TRINO as above


   -Concentrated urine


   -Anion gap 17.8-->15.4-->14.1-->17.1-->14.4-->13


   -IV fluids as indicated





Chronic:


prior VTE


HLD


chronic constipation


GERD


obesity


colon 


Hx/o ovarian cancer


S/P oophorectomy in 2007





Plan:


Admit to medical floor


Routine AM labs


Other orders as indicated above


Review home medications 


PT/OT


CM/SW for discharge planning


Dietician consult


DVT prophylaxis: Home Eliquis


Code status: Full Code


PCP: Dr. Patel


Discharge plan: LOS >96 Hr due to new onset SBO 





Dr. Griggs discussed case with patients [facility] surgeon. He suggested 

medical management and if unable to resolve symptoms, patient would be a very 

poor surgical candidate. He suggested palliative care if medical management 

fails.

## 2020-03-31 NOTE — PCM.CONSN
- General Info


Date of Service: 03/31/20


Subjective Update: 





The patient reports having an episode of emesis last night after taking her 

medications. The nausea is resolved. Pain is improved. No flatus or bowel 

movement





- Patient Data


Vitals - Most Recent: 


 Last Vital Signs











Temp  37.0 C   03/31/20 11:52


 


Pulse  81   03/31/20 11:52


 


Resp  18   03/31/20 11:52


 


BP  118/59 L  03/31/20 11:52


 


Pulse Ox  95   03/31/20 11:52











Weight - Most Recent: 108.908 kg


I&O - Last 24 Hours: 


 Intake & Output











 03/30/20 03/31/20 03/31/20





 22:59 06:59 14:59


 


Intake Total 1077 1600 


 


Output Total 400 1000 


 


Balance 677 600 











Lab Results Last 24 Hours: 


 Laboratory Results - last 24 hr











  03/30/20 03/30/20 03/30/20 Range/Units





  13:23 18:20 22:59 


 


WBC     (3.98-10.04)  K/mm3


 


RBC     (3.98-5.22)  M/mm3


 


Hgb     (11.2-15.7)  gm/dl


 


Hct     (34.1-44.9)  %


 


MCV     (79.4-94.8)  fl


 


MCH     (25.6-32.2)  pg


 


MCHC     (32.2-35.5)  g/dl


 


RDW Std Deviation     (36.4-46.3)  fL


 


Plt Count     (182-369)  K/mm3


 


MPV     (9.4-12.3)  fl


 


Neut % (Auto)     (34.0-71.1)  %


 


Lymph % (Auto)     (19.3-51.7)  %


 


Mono % (Auto)     (4.7-12.5)  %


 


Eos % (Auto)     (0.7-5.8)  


 


Baso % (Auto)     (0.1-1.2)  %


 


Neut # (Auto)     (1.56-6.13)  K/mm3


 


Lymph # (Auto)     (1.18-3.74)  K/mm3


 


Mono # (Auto)     (0.24-0.36)  K/mm3


 


Eos # (Auto)     (0.04-0.36)  K/mm3


 


Baso # (Auto)     (0.01-0.08)  K/mm3


 


Sodium     (136-145)  mEq/L


 


Potassium     (3.5-5.1)  mEq/L


 


Chloride     ()  mEq/L


 


Carbon Dioxide     (21-32)  mEq/L


 


Anion Gap     (5-15)  


 


BUN     (7-18)  mg/dL


 


Creatinine     (0.55-1.02)  mg/dL


 


Est Cr Clr Drug Dosing     mL/min


 


Estimated GFR (MDRD)     (>60)  mL/min


 


BUN/Creatinine Ratio     (14-18)  


 


Glucose     ()  mg/dL


 


POC Glucose  106  124 H  123 H  ()  mg/dL


 


Calcium     (8.5-10.1)  mg/dL


 


Phosphorus     (2.6-4.7)  mg/dL


 


Magnesium     (1.8-2.4)  mg/dl


 


Total Bilirubin     (0.2-1.0)  mg/dL


 


AST     (15-37)  U/L


 


ALT     (14-59)  U/L


 


Alkaline Phosphatase     ()  U/L


 


Total Protein     (6.4-8.2)  g/dl


 


Albumin     (3.4-5.0)  g/dl


 


Globulin     gm/dL


 


Albumin/Globulin Ratio     (1-2)  














  03/31/20 03/31/20 03/31/20 Range/Units





  05:30 05:30 05:30 


 


WBC  11.14 H    (3.98-10.04)  K/mm3


 


RBC  3.61 L    (3.98-5.22)  M/mm3


 


Hgb  10.6 L    (11.2-15.7)  gm/dl


 


Hct  35.0    (34.1-44.9)  %


 


MCV  97.0 H    (79.4-94.8)  fl


 


MCH  29.4    (25.6-32.2)  pg


 


MCHC  30.3 L    (32.2-35.5)  g/dl


 


RDW Std Deviation  46.8 H    (36.4-46.3)  fL


 


Plt Count  399 H    (182-369)  K/mm3


 


MPV  9.9    (9.4-12.3)  fl


 


Neut % (Auto)  68.9    (34.0-71.1)  %


 


Lymph % (Auto)  15.6 L    (19.3-51.7)  %


 


Mono % (Auto)  12.3    (4.7-12.5)  %


 


Eos % (Auto)  2.2    (0.7-5.8)  


 


Baso % (Auto)  0.4    (0.1-1.2)  %


 


Neut # (Auto)  7.67 H    (1.56-6.13)  K/mm3


 


Lymph # (Auto)  1.74    (1.18-3.74)  K/mm3


 


Mono # (Auto)  1.37 H    (0.24-0.36)  K/mm3


 


Eos # (Auto)  0.25    (0.04-0.36)  K/mm3


 


Baso # (Auto)  0.04    (0.01-0.08)  K/mm3


 


Sodium   144   (136-145)  mEq/L


 


Potassium   4.0   (3.5-5.1)  mEq/L


 


Chloride   109 H   ()  mEq/L


 


Carbon Dioxide   26   (21-32)  mEq/L


 


Anion Gap   13.0   (5-15)  


 


BUN   16   (7-18)  mg/dL


 


Creatinine   1.1 H   (0.55-1.02)  mg/dL


 


Est Cr Clr Drug Dosing   36.70   mL/min


 


Estimated GFR (MDRD)   48   (>60)  mL/min


 


BUN/Creatinine Ratio   14.5   (14-18)  


 


Glucose   123 H   ()  mg/dL


 


POC Glucose    132 H  ()  mg/dL


 


Calcium   8.9   (8.5-10.1)  mg/dL


 


Phosphorus   3.5   (2.6-4.7)  mg/dL


 


Magnesium   2.1   (1.8-2.4)  mg/dl


 


Total Bilirubin   0.3   (0.2-1.0)  mg/dL


 


AST   96 H   (15-37)  U/L


 


ALT   124 H   (14-59)  U/L


 


Alkaline Phosphatase   92   ()  U/L


 


Total Protein   5.4 L   (6.4-8.2)  g/dl


 


Albumin   2.2 L   (3.4-5.0)  g/dl


 


Globulin   3.2   gm/dL


 


Albumin/Globulin Ratio   0.7 L   (1-2)  














  03/31/20 Range/Units





  11:39 


 


WBC   (3.98-10.04)  K/mm3


 


RBC   (3.98-5.22)  M/mm3


 


Hgb   (11.2-15.7)  gm/dl


 


Hct   (34.1-44.9)  %


 


MCV   (79.4-94.8)  fl


 


MCH   (25.6-32.2)  pg


 


MCHC   (32.2-35.5)  g/dl


 


RDW Std Deviation   (36.4-46.3)  fL


 


Plt Count   (182-369)  K/mm3


 


MPV   (9.4-12.3)  fl


 


Neut % (Auto)   (34.0-71.1)  %


 


Lymph % (Auto)   (19.3-51.7)  %


 


Mono % (Auto)   (4.7-12.5)  %


 


Eos % (Auto)   (0.7-5.8)  


 


Baso % (Auto)   (0.1-1.2)  %


 


Neut # (Auto)   (1.56-6.13)  K/mm3


 


Lymph # (Auto)   (1.18-3.74)  K/mm3


 


Mono # (Auto)   (0.24-0.36)  K/mm3


 


Eos # (Auto)   (0.04-0.36)  K/mm3


 


Baso # (Auto)   (0.01-0.08)  K/mm3


 


Sodium   (136-145)  mEq/L


 


Potassium   (3.5-5.1)  mEq/L


 


Chloride   ()  mEq/L


 


Carbon Dioxide   (21-32)  mEq/L


 


Anion Gap   (5-15)  


 


BUN   (7-18)  mg/dL


 


Creatinine   (0.55-1.02)  mg/dL


 


Est Cr Clr Drug Dosing   mL/min


 


Estimated GFR (MDRD)   (>60)  mL/min


 


BUN/Creatinine Ratio   (14-18)  


 


Glucose   ()  mg/dL


 


POC Glucose  117 H  ()  mg/dL


 


Calcium   (8.5-10.1)  mg/dL


 


Phosphorus   (2.6-4.7)  mg/dL


 


Magnesium   (1.8-2.4)  mg/dl


 


Total Bilirubin   (0.2-1.0)  mg/dL


 


AST   (15-37)  U/L


 


ALT   (14-59)  U/L


 


Alkaline Phosphatase   ()  U/L


 


Total Protein   (6.4-8.2)  g/dl


 


Albumin   (3.4-5.0)  g/dl


 


Globulin   gm/dL


 


Albumin/Globulin Ratio   (1-2)  











Saúl Results Last 24 Hours: 


 Microbiology











 03/26/20 15:18 Aerobic Blood Culture - Preliminary





 Blood - Venous    Gram Variable Bacilli





 Anaerobic Blood Culture - Preliminary





    NO GROWTH AFTER 4 DAYS


 


 03/26/20 15:35 Aerobic Blood Culture - Preliminary





 Blood - Venous - Lab Draw    NO GROWTH AFTER 4 DAYS





 Anaerobic Blood Culture - Preliminary





    NO GROWTH AFTER 4 DAYS











Med Orders - Current: 


 Current Medications





Acetaminophen (Tylenol)  650 mg PO Q4H PRN


   PRN Reason: Pain (Mild 1-3)/fever


   Last Admin: 03/28/20 05:07 Dose:  650 mg


Benzocaine/Menthol (Cepacol Sore Throat)  1 lozenge MUCMEM Q2HR PRN


   PRN Reason: Sore Throat


Enoxaparin Sodium (Lovenox)  110 mg SUBCUT Q12H [facility]


   Last Admin: 03/31/20 08:14 Dose:  110 mg


Famotidine (Pepcid)  20 mg IVPUSH DAILY [facility]


   Last Admin: 03/31/20 08:18 Dose:  20 mg


Lactated Ringer's (Ringers, Lactated)  1,000 mls @ 150 mls/hr IV ASDIRECTED [facility]


   Last Admin: 03/31/20 11:35 Dose:  150 mls/hr


Magnesium Hydroxide (Milk Of Magnesia)  30 ml PO BID PRN


   PRN Reason: Constipation


   Last Admin: 03/29/20 05:17 Dose:  30 ml


Melatonin (Melatonin)  6 mg PO BEDTIME PRN


   PRN Reason: Sleep


   Last Admin: 03/29/20 20:51 Dose:  6 mg


Miscellaneous Information (Remove Patch)  1 ea TRDERM Q72H [facility]


   Last Admin: 03/29/20 20:00 Dose:  1 ea


Ondansetron HCl (Zofran)  4 mg IV Q6H PRN


   PRN Reason: Nausea/Vomiting


   Last Admin: 03/29/20 23:02 Dose:  4 mg


Scopolamine (Transderm-Scop)  1.5 mg TRDERM Q72H [facility]


   Last Admin: 03/29/20 20:00 Dose:  1.5 mg


Senna/Docusate Sodium (Senna Plus)  1 tab PO BID [facility]


   Last Admin: 03/31/20 08:13 Dose:  1 tab


Simethicone (Simethicone)  80 mg PO QID PRN


   PRN Reason: bloating 


   Last Admin: 03/29/20 22:37 Dose:  80 mg


Sodium Chloride (Saline Flush)  10 ml FLUSH ASDIRECTED PRN


   PRN Reason: Keep Vein Open


   Last Admin: 03/26/20 12:18 Dose:  10 ml


Tramadol HCl (Ultram)  50 mg PO Q6H PRN


   PRN Reason: Pain


   Last Admin: 03/31/20 02:07 Dose:  50 mg





Discontinued Medications





Al Hydroxide/Mg Hydroxide (Mag-Al Plus)  30 ml PO ONETIME ONE


   Stop: 03/26/20 13:42


   Last Admin: 03/26/20 13:47 Dose:  30 ml


Apixaban (Eliquis)  5 mg PO BID [facility]


   Last Admin: 03/29/20 08:45 Dose:  5 mg


Calcium Polycarbophil (Fibercon)  1,250 mg PO DAILY [facility]


   Last Admin: 03/30/20 11:01 Dose:  Not Given


Cefdinir (Omnicef)  300 mg PO BID [facility]


   Last Admin: 03/29/20 08:45 Dose:  300 mg


Famotidine (Pepcid)  20 mg IVPUSH ONETIME ONE


   Stop: 03/26/20 14:33


   Last Admin: 03/26/20 14:38 Dose:  20 mg


Famotidine (Pepcid)  20 mg PO DAILY [facility]


   Last Admin: 03/28/20 08:14 Dose:  20 mg


Famotidine (Pepcid)  20 mg PO BID [facility]


   Last Admin: 03/29/20 08:45 Dose:  20 mg


Famotidine (Pepcid)  20 mg IVPUSH DAILY [facility]


Sodium Chloride (Normal Saline)  1,000 mls @ 999 mls/hr IV ASDIRECTED [facility]


   Last Admin: 03/26/20 12:18 Dose:  150 mls/hr


Ceftriaxone Sodium 1 gm/ (Sodium Chloride)  100 mls @ 200 mls/hr IV Q24H [facility]


   Last Admin: 03/27/20 14:58 Dose:  200 mls/hr


Sodium Chloride (Normal Saline)  1,000 mls @ 75 mls/hr IV ASDIRECTED [facility]


   Stop: 03/27/20 05:49


   Last Admin: 03/26/20 16:47 Dose:  75 mls/hr


Promethazine HCl 25 mg/ Sodium (Chloride)  51 mls @ 100 mls/hr IV ONETIME ONE


   Stop: 03/26/20 18:09


   Last Admin: 03/26/20 18:32 Dose:  100 mls/hr


Promethazine HCl 25 mg/ Sodium (Chloride)  51 mls @ 100 mls/hr IV Q6H PRN


   PRN Reason: Nausea/Vomiting


Sodium Chloride (Normal Saline)  1,000 mls @ 100 mls/hr IV ASDIRECTED [facility]


   Stop: 03/28/20 17:29


   Last Admin: 03/27/20 17:44 Dose:  100 mls/hr


Dextrose/Lactated Ringer's (Dextrose 5%-Lactated Ringers)  1,000 mls @ 150 mls/

hr IV ASDIRECTED [facility]


   Last Admin: 03/31/20 07:30 Dose:  150 mls/hr


Ceftriaxone Sodium 1 gm/ (Sodium Chloride)  100 mls @ 200 mls/hr IV Q24H [facility]


   Last Admin: 03/30/20 11:40 Dose:  200 mls/hr


Multivitamins (Thera)  1 each PO DAILY [facility]


   Last Admin: 03/29/20 08:46 Dose:  Not Given


Ondansetron HCl (Zofran)  4 mg IVPUSH ONETIME ONE


   Stop: 03/26/20 12:09


   Last Admin: 03/26/20 12:18 Dose:  4 mg


Ondansetron HCl (Zofran)  4 mg IVPUSH ONETIME ONE


   Stop: 03/26/20 14:31


   Last Admin: 03/26/20 14:38 Dose:  4 mg


Ondansetron HCl (Zofran Odt)  4 mg PO Q12H [facility]


   Last Admin: 03/29/20 00:47 Dose:  4 mg


Senna/Docusate Sodium (Senna Plus)  1 tab PO BID PRN


   PRN Reason: Constipation


   Last Admin: 03/28/20 05:07 Dose:  1 tab











- Exam


Quality Assessment: No: Supplemental Oxygen


General: Alert, Oriented


HEENT: EOMI


Lungs: Normal Respiratory Effort


GI/Abdominal Exam: Soft, Non-Tender, Other (decreased firmness in LUQ)





Sepsis Event Note





- Evaluation


Sepsis Screening Result: No Definite Risk





- Focused Exam


Vital Signs: 


 Vital Signs











  Temp Temp Pulse Pulse Resp BP BP


 


 03/31/20 11:52   37.0 C   81  18   118/59 L


 


 03/31/20 11:50  37.0 C   81   18  118/59 L 


 


 03/31/20 08:24  36.7 C   84   20  126/71 


 


 03/31/20 05:34  36.8 C   80   16  114/57 L 














  Pulse Ox


 


 03/31/20 11:52  95


 


 03/31/20 11:50  95


 


 03/31/20 08:24  93 L


 


 03/31/20 05:34  92 L











Date Exam was Performed: 03/31/20


Time Exam was Performed: 16:41





Consult PN Assessment/Plan


(1) S/P laparotomy


SNOMED Code(s): 101710014, 930306911, 834845061


   Code(s): Z98.890 - OTHER SPECIFIED POSTPROCEDURAL STATES   Priority: Medium 

  Current Visit: Yes   





(2) Small bowel obstruction


SNOMED Code(s): 334125877


   Code(s): K56.609 - UNSP INTESTNL OBST, UNSP AS TO PARTIAL VERSUS COMPLETE 

OBST   Priority: High   Current Visit: Yes   





(3) Urinary tract infection


SNOMED Code(s): 08764647


   Code(s): N39.0 - URINARY TRACT INFECTION, SITE NOT SPECIFIED   Priority: 

High   Current Visit: Yes   


Qualifiers: 


   Urinary tract infection type: site unspecified   Hematuria presence: with 

hematuria   Qualified Code(s): N39.0 - Urinary tract infection, site not 

specified; R31.9 - Hematuria, unspecified   


Problem List Initiated/Reviewed/Updated: Yes


My Orders Last 24 Hours: 


My Active Orders





04/01/20 05:11


PHOSPHORUS [CHEM] AM 





04/02/20 05:11


PHOSPHORUS [CHEM] AM 











Plan: 





- continue NGT to LIS. Pt is a very poor surgical candidate at this time. The 

current window after surgery is such that she will have dense adhesions not 

amenable to adhesiolysis with a high risk of enterotomy and complication. 


- Daily BMP, Mg, Phos


- ambulate TID


- AROBF


- If NGT is in place tomorrow, consider supplementing with TPN since pt will be 

on HOD 8. Recommend PICC for administration of TPN if pt should need this. 


- medical management per primary








If patient desires only palliation, may be a candidate for venting gastrostomy 

tube if the SBO does not resolve. We will need records from [facility] about 

intraoperative findings to make this determination.





Aissatou Swanson MD


General surgery

## 2020-04-01 RX ADMIN — Medication SCH: at 20:56

## 2020-04-01 RX ADMIN — SCOPALAMINE SCH MG: 1 PATCH, EXTENDED RELEASE TRANSDERMAL at 20:58

## 2020-04-01 NOTE — PCM.PN
- General Info


Date of Service: 04/01/20


Admission Dx/Problem (Free Text): 


 Admission Diagnosis/Problem





Admission Diagnosis/Problem      Urinary tract infection








Subjective Update: 


Ninfa continues to do ok. She is pain free. 





Functional Status: Reports: Pain Controlled, Ambulating, Urinating.  Denies: 

Tolerating Diet (NPO), New Symptoms





- Review of Systems


General: Reports: No Symptoms.  Denies: Fever, Weakness, Fatigue, Malaise, 

Chills


HEENT: Reports: No Symptoms.  Denies: Headaches, Sore Throat


Pulmonary: Reports: No Symptoms.  Denies: Shortness of Breath, Cough, Sputum, 

Hemoptysis, Wheezing


Cardiovascular: Reports: No Symptoms.  Denies: Chest Pain, Palpitations


Gastrointestinal: Reports: Constipation, Other (refusing bowel regimine ).  

Denies: Abdominal Pain, Diarrhea, Nausea, Vomiting


Genitourinary: Reports: No Symptoms.  Denies: Pain


Musculoskeletal: Reports: No Symptoms


Skin: Reports: No Symptoms.  Denies: Cyanosis


Neurological: Reports: No Symptoms.  Denies: Confusion, Difficulty Walking, 

Gait Disturbance


Psychiatric: Reports: No Symptoms





- Patient Data


Vitals - Most Recent: 


 Last Vital Signs











Temp  97.9 F   04/01/20 05:50


 


Pulse  73   04/01/20 05:50


 


Resp  20   04/01/20 05:50


 


BP  113/67   04/01/20 05:50


 


Pulse Ox  96   04/01/20 05:50











Weight - Most Recent: 244 lb 1.6 oz


I&O - Last 24 Hours: 


 Intake & Output











 03/31/20 04/01/20 04/01/20





 22:59 06:59 14:59


 


Intake Total 1634 1869 


 


Output Total 850 950 


 


Balance 784 919 











Lab Results Last 24 Hours: 


 Laboratory Results - last 24 hr











  03/31/20 03/31/20 03/31/20 Range/Units





  11:39 17:31 18:28 


 


Sodium     (136-145)  mEq/L


 


Potassium     (3.5-5.1)  mEq/L


 


Chloride     ()  mEq/L


 


Carbon Dioxide     (21-32)  mEq/L


 


Anion Gap     (5-15)  


 


BUN     (7-18)  mg/dL


 


Creatinine     (0.55-1.02)  mg/dL


 


Est Cr Clr Drug Dosing     mL/min


 


Estimated GFR (MDRD)     (>60)  mL/min


 


BUN/Creatinine Ratio     (14-18)  


 


Glucose     ()  mg/dL


 


POC Glucose  117 H  83  90  ()  mg/dL


 


Calcium     (8.5-10.1)  mg/dL


 


Phosphorus     (2.6-4.7)  mg/dL


 


Total Bilirubin     (0.2-1.0)  mg/dL


 


AST     (15-37)  U/L


 


ALT     (14-59)  U/L


 


Alkaline Phosphatase     ()  U/L


 


Total Protein     (6.4-8.2)  g/dl


 


Albumin     (3.4-5.0)  g/dl


 


Globulin     gm/dL


 


Albumin/Globulin Ratio     (1-2)  














  04/01/20 04/01/20 Range/Units





  00:19 05:32 


 


Sodium   145  (136-145)  mEq/L


 


Potassium   4.0  (3.5-5.1)  mEq/L


 


Chloride   109 H  ()  mEq/L


 


Carbon Dioxide   29  (21-32)  mEq/L


 


Anion Gap   11.0  (5-15)  


 


BUN   12  (7-18)  mg/dL


 


Creatinine   1.1 H  (0.55-1.02)  mg/dL


 


Est Cr Clr Drug Dosing   36.70  mL/min


 


Estimated GFR (MDRD)   48  (>60)  mL/min


 


BUN/Creatinine Ratio   10.9 L  (14-18)  


 


Glucose   109  ()  mg/dL


 


POC Glucose  107   ()  mg/dL


 


Calcium   8.8  (8.5-10.1)  mg/dL


 


Phosphorus   3.3  (2.6-4.7)  mg/dL


 


Total Bilirubin   0.3  (0.2-1.0)  mg/dL


 


AST   53 H  (15-37)  U/L


 


ALT   90 H  (14-59)  U/L


 


Alkaline Phosphatase   89  ()  U/L


 


Total Protein   5.7 L  (6.4-8.2)  g/dl


 


Albumin   2.2 L  (3.4-5.0)  g/dl


 


Globulin   3.5  gm/dL


 


Albumin/Globulin Ratio   0.6 L  (1-2)  











Saúl Results Last 24 Hours: 


 Microbiology











 03/26/20 15:18 Aerobic Blood Culture - Preliminary





 Blood - Venous    Gram Variable Bacilli





 Anaerobic Blood Culture - Preliminary





    NO GROWTH AFTER 5 DAYS


 


 03/26/20 15:35 Aerobic Blood Culture - Preliminary





 Blood - Venous - Lab Draw    NO GROWTH AFTER 5 DAYS





 Anaerobic Blood Culture - Preliminary





    NO GROWTH AFTER 5 DAYS











Med Orders - Current: 


 Current Medications





Acetaminophen (Tylenol)  650 mg PO Q4H PRN


   PRN Reason: Pain (Mild 1-3)/fever


   Last Admin: 03/28/20 05:07 Dose:  650 mg


Benzocaine (Hurricaine 20% Spray)  0 ml MUCMEM Q2H PRN


   PRN Reason: Sore Throat


   Last Admin: 03/31/20 15:19 Dose:  1 spray


Benzocaine/Menthol (Cepacol Sore Throat)  1 lozenge MUCMEM Q2HR PRN


   PRN Reason: Sore Throat


Dextrose/Water (Dextrose 50% In Water)  50 ml IVPUSH ASDIRECTED PRN


   PRN Reason: Hypoglycemia


Enoxaparin Sodium (Lovenox)  110 mg SUBCUT Q12H [facility]


   Last Admin: 03/31/20 20:58 Dose:  110 mg


Famotidine (Pepcid)  20 mg IVPUSH DAILY [facility]


   Last Admin: 03/31/20 08:18 Dose:  20 mg


Dextrose/Lactated Ringer's (Dextrose 5%-Lactated Ringers)  1,000 mls @ 150 mls/

hr IV ASDIRECTED [facility]


   Last Admin: 04/01/20 06:58 Dose:  150 mls/hr


Magnesium Hydroxide (Milk Of Magnesia)  30 ml PO BID PRN


   PRN Reason: Constipation


   Last Admin: 03/29/20 05:17 Dose:  30 ml


Melatonin (Melatonin)  6 mg PO BEDTIME PRN


   PRN Reason: Sleep


   Last Admin: 03/31/20 20:54 Dose:  6 mg


Miscellaneous Information (Remove Patch)  1 ea TRDERM Q72H [facility]


   Last Admin: 03/29/20 20:00 Dose:  1 ea


Ondansetron HCl (Zofran)  4 mg IV Q6H PRN


   PRN Reason: Nausea/Vomiting


   Last Admin: 03/29/20 23:02 Dose:  4 mg


Scopolamine (Transderm-Scop)  1.5 mg TRDERM Q72H [facility]


   Last Admin: 03/29/20 20:00 Dose:  1.5 mg


Senna/Docusate Sodium (Senna Plus)  1 tab PO BID [facility]


   Last Admin: 03/31/20 20:54 Dose:  1 tab


Simethicone (Simethicone)  80 mg PO QID PRN


   PRN Reason: bloating 


   Last Admin: 03/31/20 20:54 Dose:  80 mg


Sodium Chloride (Saline Flush)  10 ml FLUSH ASDIRECTED PRN


   PRN Reason: Keep Vein Open


   Last Admin: 03/26/20 12:18 Dose:  10 ml


Tramadol HCl (Ultram)  50 mg PO Q6H PRN


   PRN Reason: Pain


   Last Admin: 03/31/20 02:07 Dose:  50 mg





Discontinued Medications





Al Hydroxide/Mg Hydroxide (Mag-Al Plus)  30 ml PO ONETIME ONE


   Stop: 03/26/20 13:42


   Last Admin: 03/26/20 13:47 Dose:  30 ml


Apixaban (Eliquis)  5 mg PO BID [facility]


   Last Admin: 03/29/20 08:45 Dose:  5 mg


Calcium Polycarbophil (Fibercon)  1,250 mg PO DAILY [facility]


   Last Admin: 03/30/20 11:01 Dose:  Not Given


Cefdinir (Omnicef)  300 mg PO BID [facility]


   Last Admin: 03/29/20 08:45 Dose:  300 mg


Famotidine (Pepcid)  20 mg IVPUSH ONETIME ONE


   Stop: 03/26/20 14:33


   Last Admin: 03/26/20 14:38 Dose:  20 mg


Famotidine (Pepcid)  20 mg PO DAILY [facility]


   Last Admin: 03/28/20 08:14 Dose:  20 mg


Famotidine (Pepcid)  20 mg PO BID [facility]


   Last Admin: 03/29/20 08:45 Dose:  20 mg


Famotidine (Pepcid)  20 mg IVPUSH DAILY [facility]


Sodium Chloride (Normal Saline)  1,000 mls @ 999 mls/hr IV ASDIRECTED [facility]


   Last Admin: 03/26/20 12:18 Dose:  150 mls/hr


Ceftriaxone Sodium 1 gm/ (Sodium Chloride)  100 mls @ 200 mls/hr IV Q24H [facility]


   Last Admin: 03/27/20 14:58 Dose:  200 mls/hr


Sodium Chloride (Normal Saline)  1,000 mls @ 75 mls/hr IV ASDIRECTED [facility]


   Stop: 03/27/20 05:49


   Last Admin: 03/26/20 16:47 Dose:  75 mls/hr


Promethazine HCl 25 mg/ Sodium (Chloride)  51 mls @ 100 mls/hr IV ONETIME ONE


   Stop: 03/26/20 18:09


   Last Admin: 03/26/20 18:32 Dose:  100 mls/hr


Promethazine HCl 25 mg/ Sodium (Chloride)  51 mls @ 100 mls/hr IV Q6H PRN


   PRN Reason: Nausea/Vomiting


Sodium Chloride (Normal Saline)  1,000 mls @ 100 mls/hr IV ASDIRECTED [facility]


   Stop: 03/28/20 17:29


   Last Admin: 03/27/20 17:44 Dose:  100 mls/hr


Dextrose/Lactated Ringer's (Dextrose 5%-Lactated Ringers)  1,000 mls @ 150 mls/

hr IV ASDIRECTED [facility]


   Last Admin: 03/31/20 07:30 Dose:  150 mls/hr


Ceftriaxone Sodium 1 gm/ (Sodium Chloride)  100 mls @ 200 mls/hr IV Q24H [facility]


   Last Admin: 03/30/20 11:40 Dose:  200 mls/hr


Lactated Ringer's (Ringers, Lactated)  [facility]


   Last Admin: 03/31/20 17:32 Dose:  150 mls/hr


Multivitamins (Thera)  1 each PO DAILY [facility]


   Last Admin: 03/29/20 08:46 Dose:  Not Given


Ondansetron HCl (Zofran)  4 mg IVPUSH ONETIME ONE


   Stop: 03/26/20 12:09


   Last Admin: 03/26/20 12:18 Dose:  4 mg


Ondansetron HCl (Zofran)  4 mg IVPUSH ONETIME ONE


   Stop: 03/26/20 14:31


   Last Admin: 03/26/20 14:38 Dose:  4 mg


Ondansetron HCl (Zofran Odt)  4 mg PO Q12H [facility]


   Last Admin: 03/29/20 00:47 Dose:  4 mg


Senna/Docusate Sodium (Senna Plus)  1 tab PO BID PRN


   PRN Reason: Constipation


   Last Admin: 03/28/20 05:07 Dose:  1 tab











- Exam


Quality Assessment: DVT Prophylaxis


General: Alert, Oriented, Cooperative, No Acute Distress


HEENT: Pupils Equal, Pupils Reactive, Mucous Membr. Moist/Pink


Neck: Supple, Trachea Midline


Lungs: Clear to Auscultation, Normal Respiratory Effort


Cardiovascular: Regular Rate, Regular Rhythm


GI/Abdominal Exam: Non-Tender, No Distention, Abnormal Bowel Sounds (Absent )


 (Female) Exam: Deferred


Back Exam: Normal Inspection, Full Range of Motion


Extremities: Normal Inspection, Normal Range of Motion, Non-Tender, No Pedal 

Edema, Normal Capillary Refill


Peripheral Pulses: 2+: Radial (L), Radial (R), Dorsalis Pedis (L), Dorsalis 

Pedis (R)


Skin: Warm, Dry, Intact


Wound/Incisions: Healing Well.  No: Erythema


Neurological: No New Focal Deficit


Psy/Mental Status: Alert





Sepsis Event Note





- Evaluation


Sepsis Screening Result: No Definite Risk





- Focused Exam


Vital Signs: 


 Vital Signs











  Temp Pulse Resp BP Pulse Ox


 


 04/01/20 05:50  97.9 F  73  20  113/67  96


 


 04/01/20 00:23  98.1 F  80  18  119/69  96











Date Exam was Performed: 04/01/20


Time Exam was Performed: 14:49





- Problem List & Annotations


(1) Nausea and vomiting


SNOMED Code(s): 82161676


   Code(s): R11.2 - NAUSEA WITH VOMITING, UNSPECIFIED   Status: Resolved   

Priority: High   Current Visit: Yes   


Qualifiers: 


   Vomiting type: unspecified   Vomiting Intractability: intractable   

Qualified Code(s): R11.2 - Nausea with vomiting, unspecified   





(2) History of ovarian cancer


Status: Chronic   Priority: Medium   Current Visit: No   





(3) Colon cancer


SNOMED Code(s): 169858879


   Code(s): C18.9 - MALIGNANT NEOPLASM OF COLON, UNSPECIFIED   Status: Acute   

Priority: High   Current Visit: Yes   


Qualifiers: 


   Colon location: unspecified part of colon   Qualified Code(s): C18.9 - 

Malignant neoplasm of colon, unspecified   





(4) Leukocytosis


SNOMED Code(s): 997031098, 352532983


   Code(s): D72.829 - ELEVATED WHITE BLOOD CELL COUNT, UNSPECIFIED   Status: 

Acute   Priority: High   Current Visit: Yes   


Qualifiers: 


   Leukocytosis type: unspecified   Qualified Code(s): D72.829 - Elevated white 

blood cell count, unspecified   





(5) Acute renal injury


SNOMED Code(s): 29486734, 02122898


   Code(s): N17.9 - ACUTE KIDNEY FAILURE, UNSPECIFIED   Status: Acute   Priority

: High   Current Visit: Yes   





(6) High anion gap metabolic acidosis


SNOMED Code(s): 86595382


   Code(s): E87.2 - ACIDOSIS   Status: Acute   Priority: High   Current Visit: 

Yes   





(7) History of venous thromboembolism


SNOMED Code(s): 829222629


   Code(s): Z86.718 - PERSONAL HISTORY OF OTHER VENOUS THROMBOSIS AND EMBOLISM 

  Status: Chronic   Priority: Medium   Current Visit: Yes   





(8) Chronic anticoagulation


SNOMED Code(s): 112707051


   Code(s): Z79.01 - LONG TERM (CURRENT) USE OF ANTICOAGULANTS   Status: 

Chronic   Priority: Low   Current Visit: Yes   





(9) HLD (hyperlipidemia)


SNOMED Code(s): 82318440


   Code(s): E78.5 - HYPERLIPIDEMIA, UNSPECIFIED   Status: Chronic   Priority: 

Low   Current Visit: No   


Qualifiers: 


   Hyperlipidemia type: unspecified   Qualified Code(s): E78.5 - Hyperlipidemia

, unspecified   





(10) GERD (gastroesophageal reflux disease)


SNOMED Code(s): 262338843


   Code(s): K21.9 - GASTRO-ESOPHAGEAL REFLUX DISEASE WITHOUT ESOPHAGITIS   

Status: Chronic   Priority: Medium   Current Visit: No   


Qualifiers: 


   Esophagitis presence: esophagitis presence not specified   Qualified Code(s)

: K21.9 - Gastro-esophageal reflux disease without esophagitis   





(11) Obesity


SNOMED Code(s): 032052313, 502083904


   Code(s): E66.9 - OBESITY, UNSPECIFIED   Status: Chronic   Priority: Medium   

Current Visit: No   


Qualifiers: 


   Obesity type: unspecified obesity type   Obesity classification: adult class 

2 (BMI 35 - 39.9)   Body mass index: BMI 38.0-38.9 





(12) Hematochezia


SNOMED Code(s): 248322198


   Code(s): K92.1 - MELENA   Status: Chronic   Priority: Medium   Current Visit

: Yes   





(13) Urinary tract infection


SNOMED Code(s): 63736158


   Code(s): N39.0 - URINARY TRACT INFECTION, SITE NOT SPECIFIED   Status: Acute

   Priority: High   Current Visit: Yes   


Qualifiers: 


   Urinary tract infection type: site unspecified   Hematuria presence: with 

hematuria   Qualified Code(s): N39.0 - Urinary tract infection, site not 

specified; R31.9 - Hematuria, unspecified   





(14) S/P laparotomy


SNOMED Code(s): 930008347, 684056395, 668844003


   Code(s): Z98.890 - OTHER SPECIFIED POSTPROCEDURAL STATES   Status: Acute   

Priority: Medium   Current Visit: Yes   





(15) Volume depletion


SNOMED Code(s): 086297030


   Code(s): E86.9 - VOLUME DEPLETION, UNSPECIFIED   Status: Acute   Current 

Visit: Yes   





(16) Chronic constipation


SNOMED Code(s): 559086345


   Code(s): K59.09 - OTHER CONSTIPATION   Status: Chronic   Priority: High   

Current Visit: Yes   





(17) Small bowel obstruction


SNOMED Code(s): 244366426


   Code(s): K56.609 - UNSP INTESTNL OBST, UNSP AS TO PARTIAL VERSUS COMPLETE 

OBST   Status: Acute   Priority: High   Current Visit: Yes   





- Problem List Review


Problem List Initiated/Reviewed/Updated: Yes





- My Orders


Last 24 Hours: 


My Active Orders





03/31/20 14:48


Benzocaine [Hurricaine 20% Spray]   0 ml MUCMEM Q2H PRN 





03/31/20 18:45


Dextrose 5%-Lactated Ringers 1,000 ml IV ASDIRECTED 














- Plan


Plan:: 


I/P:





Acute:





SBO/Ileus


   -Last BM was 3/25/20


   -History of chronic constipation, laparotomy on 3/18/20 and discharged on 3/

20/20


   -Reported significant abdominal cancer, ovarian primary


   -Reported N/V prior to coming to ED; Nausea and emesis returned 3/29/20


   -Abdominal X-ray shows distended small bowel


   -NG tube placed


   -Low intermittent suction


   -NPO


   -Ambulate


   -Dr Burnett, general surgeon consulted -> Signed off due to nothing surgical 

to offer 


   -IV fluids as ordered


   -Q6hr blood glucose checks


   -Scheduled Phenergan x3 doses 


   -Consider starting TPN


   


Urinary tract infection


   -Reports hematuria


   -Recently underwent surgery at [facility] - martinez catheter placed during 

procedure


   -Denies any fever


   -WBC 17.18-->13.51-->11.48-->10.69-->11.14


   -CRP 5.3


   -UA: Cloudy, concentrated, 2+ protein, 2+ ketones, 3+ occult blood, positive 

nitrite, 1+ bilirubin, 1+ leukocyte esterase, 40-50 RBC, greater than 100 WBCs, 

moderate bacteria.


   -Urine culture: Pan sensitive Klebsiella


   -Blood cultures negative 


   -Sepsis criteria:


      -Apparent bacterial infection, No fever, WBC 17.18, No tachypnea, No 

tachycardia, Lactic acid 1.1


      -Does not meet sepsis criteria at this point


   -Started on Rocephin 1gm in ED - Briefly switched to PO then resumed 

Rocephin due to SBO


   -IV fluids as indicated





Nausea and vomiting, 2/2 SBO


   -Reports history of significant post-operative nausea and vomiting


   -Has had symptoms since return from surgery


   -Given IV fluids and zofran after discharge with minimal brief improvement 

of symptoms


   -Zofran in ED with minimal improvement


   -Start Phenergan PRN -> discontinue, PRN zofran 


   -Scopolamine patch 


   -Monitor electrolytes - good so far


   -Vomited 3/29/20


   


S/P laparotomy 2/2 planned colon resection


   -Performed at [facility]


   -Daughter reports procedure aborted once she was opened due to significance 

of neoplasm


      -Planning chemotherapy in [place]


   -No signs of infection of surgical site


   -Continue to monitor 





Acute kidney injury, stable


   -Likely 2/2 dehydration from vomiting


   -BUN 27-->27-->22-->20-->16-->16-->12


   -Creatinine 1.3-->1.2-->1.2-->1.2-->1.1-->1.1


   -eGFR 39-->43-->43-->43-->48-->48


   -IV bolus given in ED


   -IV fluids as ordered





Volume depletion


   -TRINO as above


   -Concentrated urine


   -Anion gap 17.8-->15.4-->14.1-->17.1-->14.4-->13-->11


   -IV fluids as indicated





Chronic:


prior VTE


HLD


chronic constipation


GERD


obesity


colon 


Hx/o ovarian cancer


S/P oophorectomy in 2007





Plan:


Admit to medical floor


Routine AM labs


Other orders as indicated above


Review home medications 


PT/OT


CM/SW for discharge planning


Dietician consult


DVT prophylaxis: Home Eliquis


Code status: Full Code


PCP: Dr. Patel


Discharge plan: LOS >96 Hr due to new onset SBO 





Dr. Griggs discussed case with patients [facility] surgeon and patients 

oncologist in [place]. They suggested medical management and if unable to 

resolve symptoms, patient would be a very poor surgical candidate. Also 

suggested patient would not be candidate for chemotherapy. Suggested palliative 

care if medical management fails. Discussed this with family. Also discussed 

TPN supplementation. Patient and family will discuss these options and let us 

know plan.

## 2020-04-01 NOTE — PCM.CONSN
- General Info


Date of Service: 04/01/20


Subjective Update: 





Pt reports not having any nausea or any more episodes of vomiting. No flatus or 

bowel movement





- Patient Data


Vitals - Most Recent: 


 Last Vital Signs











Temp  36.8 C   04/01/20 08:52


 


Pulse  80   04/01/20 08:52


 


Resp  20   04/01/20 08:52


 


BP  137/66   04/01/20 08:52


 


Pulse Ox  95   04/01/20 08:52











Weight - Most Recent: 110.722 kg


I&O - Last 24 Hours: 


 Intake & Output











 03/31/20 04/01/20 04/01/20





 22:59 06:59 14:59


 


Intake Total 1634 1869 


 


Output Total 850 950 


 


Balance 784 919 











Lab Results Last 24 Hours: 


 Laboratory Results - last 24 hr











  03/31/20 03/31/20 04/01/20 Range/Units





  17:31 18:28 00:19 


 


Sodium     (136-145)  mEq/L


 


Potassium     (3.5-5.1)  mEq/L


 


Chloride     ()  mEq/L


 


Carbon Dioxide     (21-32)  mEq/L


 


Anion Gap     (5-15)  


 


BUN     (7-18)  mg/dL


 


Creatinine     (0.55-1.02)  mg/dL


 


Est Cr Clr Drug Dosing     mL/min


 


Estimated GFR (MDRD)     (>60)  mL/min


 


BUN/Creatinine Ratio     (14-18)  


 


Glucose     ()  mg/dL


 


POC Glucose  83  90  107  ()  mg/dL


 


Calcium     (8.5-10.1)  mg/dL


 


Phosphorus     (2.6-4.7)  mg/dL


 


Total Bilirubin     (0.2-1.0)  mg/dL


 


AST     (15-37)  U/L


 


ALT     (14-59)  U/L


 


Alkaline Phosphatase     ()  U/L


 


Total Protein     (6.4-8.2)  g/dl


 


Albumin     (3.4-5.0)  g/dl


 


Globulin     gm/dL


 


Albumin/Globulin Ratio     (1-2)  














  04/01/20 Range/Units





  05:32 


 


Sodium  145  (136-145)  mEq/L


 


Potassium  4.0  (3.5-5.1)  mEq/L


 


Chloride  109 H  ()  mEq/L


 


Carbon Dioxide  29  (21-32)  mEq/L


 


Anion Gap  11.0  (5-15)  


 


BUN  12  (7-18)  mg/dL


 


Creatinine  1.1 H  (0.55-1.02)  mg/dL


 


Est Cr Clr Drug Dosing  36.70  mL/min


 


Estimated GFR (MDRD)  48  (>60)  mL/min


 


BUN/Creatinine Ratio  10.9 L  (14-18)  


 


Glucose  109  ()  mg/dL


 


POC Glucose   ()  mg/dL


 


Calcium  8.8  (8.5-10.1)  mg/dL


 


Phosphorus  3.3  (2.6-4.7)  mg/dL


 


Total Bilirubin  0.3  (0.2-1.0)  mg/dL


 


AST  53 H  (15-37)  U/L


 


ALT  90 H  (14-59)  U/L


 


Alkaline Phosphatase  89  ()  U/L


 


Total Protein  5.7 L  (6.4-8.2)  g/dl


 


Albumin  2.2 L  (3.4-5.0)  g/dl


 


Globulin  3.5  gm/dL


 


Albumin/Globulin Ratio  0.6 L  (1-2)  











Saúl Results Last 24 Hours: 


 Microbiology











 03/26/20 15:18 Aerobic Blood Culture - Preliminary





 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





 Anaerobic Blood Culture - Preliminary





    NO GROWTH AFTER 5 DAYS


 


 03/26/20 15:35 Aerobic Blood Culture - Preliminary





 Blood - Venous - Lab Draw    NO GROWTH AFTER 5 DAYS





 Anaerobic Blood Culture - Preliminary





    NO GROWTH AFTER 5 DAYS











Med Orders - Current: 


 Current Medications





Acetaminophen (Tylenol)  650 mg PO Q4H PRN


   PRN Reason: Pain (Mild 1-3)/fever


   Last Admin: 03/28/20 05:07 Dose:  650 mg


Benzocaine (Hurricaine 20% Spray)  0 ml MUCMEM Q2H PRN


   PRN Reason: Sore Throat


   Last Admin: 03/31/20 15:19 Dose:  1 spray


Benzocaine/Menthol (Cepacol Sore Throat)  1 lozenge MUCMEM Q2HR PRN


   PRN Reason: Sore Throat


Dextrose/Water (Dextrose 50% In Water)  50 ml IVPUSH ASDIRECTED PRN


   PRN Reason: Hypoglycemia


Enoxaparin Sodium (Lovenox)  110 mg SUBCUT Q12H [facility]


   Last Admin: 04/01/20 09:26 Dose:  110 mg


Famotidine (Pepcid)  20 mg IVPUSH DAILY [facility]


   Last Admin: 04/01/20 09:25 Dose:  20 mg


Dextrose/Lactated Ringer's (Dextrose 5%-Lactated Ringers)  1,000 mls @ 150 mls/

hr IV ASDIRECTED [facility]


   Last Admin: 04/01/20 06:58 Dose:  150 mls/hr


Magnesium Hydroxide (Milk Of Magnesia)  30 ml PO BID PRN


   PRN Reason: Constipation


   Last Admin: 03/29/20 05:17 Dose:  30 ml


Melatonin (Melatonin)  6 mg PO BEDTIME PRN


   PRN Reason: Sleep


   Last Admin: 03/31/20 20:54 Dose:  6 mg


Metoclopramide HCl (Reglan)  10 mg IVPUSH Q6H [facility]


   Stop: 04/01/20 23:01


   Last Admin: 04/01/20 12:12 Dose:  10 mg


Miscellaneous Information (Remove Patch)  1 ea TRDERM Q72H [facility]


   Last Admin: 03/29/20 20:00 Dose:  1 ea


Ondansetron HCl (Zofran)  4 mg IV Q6H PRN


   PRN Reason: Nausea/Vomiting


   Last Admin: 03/29/20 23:02 Dose:  4 mg


Scopolamine (Transderm-Scop)  1.5 mg TRDERM Q72H [facility]


   Last Admin: 03/29/20 20:00 Dose:  1.5 mg


Senna/Docusate Sodium (Senna Plus)  1 tab PO BID [facility]


   Last Admin: 04/01/20 09:26 Dose:  Not Given


Simethicone (Simethicone)  80 mg PO QID PRN


   PRN Reason: bloating 


   Last Admin: 03/31/20 20:54 Dose:  80 mg


Sodium Chloride (Saline Flush)  10 ml FLUSH ASDIRECTED PRN


   PRN Reason: Keep Vein Open


   Last Admin: 03/26/20 12:18 Dose:  10 ml


Tramadol HCl (Ultram)  50 mg PO Q6H PRN


   PRN Reason: Pain


   Last Admin: 03/31/20 02:07 Dose:  50 mg





Discontinued Medications





Al Hydroxide/Mg Hydroxide (Mag-Al Plus)  30 ml PO ONETIME ONE


   Stop: 03/26/20 13:42


   Last Admin: 03/26/20 13:47 Dose:  30 ml


Apixaban (Eliquis)  5 mg PO BID [facility]


   Last Admin: 03/29/20 08:45 Dose:  5 mg


Bisacodyl (Dulcolax)  10 mg RECTAL ONETIME ONE


   Stop: 04/01/20 11:55


Calcium Polycarbophil (Fibercon)  1,250 mg PO DAILY [facility]


   Last Admin: 03/30/20 11:01 Dose:  Not Given


Cefdinir (Omnicef)  300 mg PO BID [facility]


   Last Admin: 03/29/20 08:45 Dose:  300 mg


Famotidine (Pepcid)  20 mg IVPUSH ONETIME ONE


   Stop: 03/26/20 14:33


   Last Admin: 03/26/20 14:38 Dose:  20 mg


Famotidine (Pepcid)  20 mg PO DAILY [facility]


   Last Admin: 03/28/20 08:14 Dose:  20 mg


Famotidine (Pepcid)  20 mg PO BID [facility]


   Last Admin: 03/29/20 08:45 Dose:  20 mg


Famotidine (Pepcid)  20 mg IVPUSH DAILY [facility]


Sodium Chloride (Normal Saline)  1,000 mls [facility]


   Last Admin: 03/26/20 12:18 Dose:  150 mls/hr


Ceftriaxone Sodium 1 gm/ (Sodium Chloride)  100 mls @ 200 mls/hr IV Q24H [facility]


   Last Admin: 03/27/20 14:58 Dose:  200 mls/hr


Sodium Chloride (Normal Saline)  1,000 mls @ 75 mls/hr IV ASDIRECTED [facility]


   Stop: 03/27/20 05:49


   Last Admin: 03/26/20 16:47 Dose:  75 mls/hr


Promethazine HCl 25 mg/ Sodium (Chloride)  51 mls @ 100 mls/hr IV ONETIME ONE


   Stop: 03/26/20 18:09


   Last Admin: 03/26/20 18:32 Dose:  100 mls/hr


Promethazine HCl 25 mg/ Sodium (Chloride)  51 mls @ 100 mls/hr IV Q6H PRN


   PRN Reason: Nausea/Vomiting


Sodium Chloride (Normal Saline)  1,000 mls @ 100 mls/hr IV ASDIRECTED [facility]


   Stop: 03/28/20 17:29


   Last Admin: 03/27/20 17:44 Dose:  100 mls/hr


Dextrose/Lactated Ringer's (Dextrose 5%-Lactated Ringers)  1,000 mls @ 150 mls/

hr IV ASDIRECTED [facility]


   Last Admin: 03/31/20 07:30 Dose:  150 mls/hr


Ceftriaxone Sodium 1 gm/ (Sodium Chloride)  100 mls @ 200 mls/hr IV Q24H [facility]


   Last Admin: 03/30/20 11:40 Dose:  200 mls/hr


Lactated Ringer's (Ringers, Lactated)  1,000 mls @ 150 mls/hr IV ASDIRECTED [facility]


   Last Admin: 03/31/20 17:32 Dose:  150 mls/hr


Metoclopramide HCl (Reglan)  10 mg IVPUSH Q6H [facility]


   Stop: 04/01/20 21:16


   Last Admin: 04/01/20 12:12 Dose:  Not Given


Multivitamins (Thera)  1 each PO DAILY [facility]


   Last Admin: 03/29/20 08:46 Dose:  Not Given


Ondansetron HCl (Zofran)  4 mg IVPUSH ONETIME ONE


   Stop: 03/26/20 12:09


   Last Admin: 03/26/20 12:18 Dose:  4 mg


Ondansetron HCl (Zofran)  4 mg IVPUSH ONETIME ONE


   Stop: 03/26/20 14:31


   Last Admin: 03/26/20 14:38 Dose:  4 mg


Ondansetron HCl (Zofran Odt)  4 mg PO Q12H [facility]


   Last Admin: 03/29/20 00:47 Dose:  4 mg


Senna/Docusate Sodium (Senna Plus)  1 tab PO BID PRN


   PRN Reason: Constipation


   Last Admin: 03/28/20 05:07 Dose:  1 tab











- Exam


Quality Assessment: No: Supplemental Oxygen


General: Alert, Oriented


HEENT: Other (NGT in place)


Neck: Supple


Lungs: Normal Respiratory Effort





Sepsis Event Note





- Evaluation


Sepsis Screening Result: No Definite Risk





- Focused Exam


Vital Signs: 


 Vital Signs











  Temp Pulse Resp BP Pulse Ox


 


 04/01/20 08:52  36.8 C  80  20  137/66  95


 


 04/01/20 05:50  36.6 C  73  20  113/67  96











Date Exam was Performed: 04/01/20


Time Exam was Performed: 12:30





Consult PN Assessment/Plan


(1) S/P laparotomy


SNOMED Code(s): 428970822, 616363739, 241168706


   Code(s): Z98.890 - OTHER SPECIFIED POSTPROCEDURAL STATES   Priority: Medium 

  Current Visit: Yes   





(2) Small bowel obstruction


SNOMED Code(s): 562416115


   Code(s): K56.609 - UNSP INTESTNL OBST, UNSP AS TO PARTIAL VERSUS COMPLETE 

OBST   Priority: High   Current Visit: Yes   





(3) Urinary tract infection


SNOMED Code(s): 18218758


   Code(s): N39.0 - URINARY TRACT INFECTION, SITE NOT SPECIFIED   Priority: 

High   Current Visit: Yes   


Qualifiers: 


   Urinary tract infection type: site unspecified   Hematuria presence: with 

hematuria   Qualified Code(s): N39.0 - Urinary tract infection, site not 

specified; R31.9 - Hematuria, unspecified   


Problem List Initiated/Reviewed/Updated: Yes


My Orders Last 24 Hours: 


My Active Orders





04/02/20 05:11


PHOSPHORUS [CHEM] AM 











Plan: 





- continue NGT to LIS. Pt is a very poor surgical candidate at this time. The 

current window after surgery is such that she will have dense adhesions not 

amenable to adhesiolysis with a high risk of enterotomy and complication. Also 

discussed with Dr. Griggs surgical findings from the surgeon at [facility]. 

per the report, pt has tumor studding throughout the abdomen and no further 

surgical options are available at this time. Also no further chemotherapy 

options.





I discussed the possibility of a venting gastrostomy placed percutaneously, but 

that this has risks if the abdomen is frozen with tumor. Pt and family do not 

wish for her to have any more surgery since she has significant nausea and 

vomiting from the anesthetic and surgical procedures.





- Daily BMP, Mg, Phos while in the hospital


- ambulate TID


- AROBF


- medical management per primary








Pt is not sure what her goals are at this time. Would like to discuss with 

family. Since she is not desiring surgery, will sign off. Please call back with 

any new surgical questions or concerns.





Aissatou Swanson MD


General surgery

## 2020-04-02 NOTE — PCM.PN
- General Info


Date of Service: 04/02/20


Subjective Update: 





Slept OK


No complaints








- Patient Data


Vitals - Most Recent: 


 Last Vital Signs











Temp  97.9 F   04/02/20 12:17


 


Pulse  86   04/02/20 12:17


 


Resp  16   04/02/20 12:17


 


BP  134/68   04/02/20 12:17


 


Pulse Ox  97   04/02/20 12:17











Weight - Most Recent: 112.763 kg





- Exam


Physical Findings Comments:: 





General: Alert, Oriented, Cooperative, No Acute Distress


HEENT: Pupils Equal, Pupils Reactive, Mucous Membr. Moist/Pink


Neck: Supple, Trachea Midline


Lungs: Clear to Auscultation, Normal Respiratory Effort


Cardiovascular: Regular Rate, Regular Rhythm


GI/Abdominal Exam: Non-Tender, No Distention, Abnormal Bowel Sounds (Absent )


 (Female) Exam: Deferred


Back Exam: Normal Inspection, Full Range of Motion


Extremities: Normal Inspection, Normal Range of Motion, Non-Tender, No Pedal 

Edema, Normal Capillary Refill


Peripheral Pulses: 2+: Radial (L), Radial (R), Dorsalis Pedis (L), Dorsalis 

Pedis (R)


Skin: Warm, Dry, Intact


Wound/Incisions: Healing Well.  No: Erythema


Neurological: No New Focal Deficit


Psy/Mental Status: Alert





Sepsis Event Note





- Evaluation


Sepsis Screening Result: No Definite Risk





- Focused Exam


Vital Signs: 


 Vital Signs











  Temp Pulse Resp BP Pulse Ox


 


 04/02/20 12:17  97.9 F  86  16  134/68  97


 


 04/02/20 08:29  98.1 F  82  18  116/60  94 L


 


 04/02/20 04:18  98.1 F  76  18  108/59 L  97











Date Exam was Performed: 04/03/20


Time Exam was Performed: 13:09





- Problem List & Annotations


(1) Ovarian cancer


SNOMED Code(s): 471948597


   Code(s): C56.9 - MALIGNANT NEOPLASM OF UNSPECIFIED OVARY   Status: Acute   

Current Visit: Yes   





(2) Acute renal injury


SNOMED Code(s): 84752406, 77702524


   Code(s): N17.9 - ACUTE KIDNEY FAILURE, UNSPECIFIED   Status: Acute   Priority

: High   Current Visit: Yes   





(3) Chronic anticoagulation


SNOMED Code(s): 618755506


   Code(s): Z79.01 - LONG TERM (CURRENT) USE OF ANTICOAGULANTS   Status: 

Chronic   Priority: Low   Current Visit: Yes   





(4) Chronic constipation


SNOMED Code(s): 165305657


   Code(s): K59.09 - OTHER CONSTIPATION   Status: Chronic   Priority: High   

Current Visit: Yes   





(5) Colon cancer


SNOMED Code(s): 057264478


   Code(s): C18.9 - MALIGNANT NEOPLASM OF COLON, UNSPECIFIED   Status: Acute   

Priority: High   Current Visit: Yes   


Qualifiers: 


   Colon location: unspecified part of colon   Qualified Code(s): C18.9 - 

Malignant neoplasm of colon, unspecified   





(6) GERD (gastroesophageal reflux disease)


SNOMED Code(s): 268465518


   Code(s): K21.9 - GASTRO-ESOPHAGEAL REFLUX DISEASE WITHOUT ESOPHAGITIS   

Status: Chronic   Priority: Medium   Current Visit: No   


Qualifiers: 


   Esophagitis presence: esophagitis presence not specified   Qualified Code(s)

: K21.9 - Gastro-esophageal reflux disease without esophagitis   





(7) Hematochezia


SNOMED Code(s): 092062558


   Code(s): K92.1 - MELENA   Status: Chronic   Priority: Medium   Current Visit

: Yes   





(8) High anion gap metabolic acidosis


SNOMED Code(s): 63387291


   Code(s): E87.2 - ACIDOSIS   Status: Acute   Priority: High   Current Visit: 

Yes   





(9) History of ovarian cancer


Status: Chronic   Priority: Medium   Current Visit: No   





(10) History of venous thromboembolism


SNOMED Code(s): 376538009


   Code(s): Z86.718 - PERSONAL HISTORY OF OTHER VENOUS THROMBOSIS AND EMBOLISM 

  Status: Chronic   Priority: Medium   Current Visit: Yes   





(11) HLD (hyperlipidemia)


SNOMED Code(s): 45066641


   Code(s): E78.5 - HYPERLIPIDEMIA, UNSPECIFIED   Status: Chronic   Priority: 

Low   Current Visit: No   


Qualifiers: 


   Hyperlipidemia type: unspecified   Qualified Code(s): E78.5 - Hyperlipidemia

, unspecified   





(12) Leukocytosis


SNOMED Code(s): 159562844, 666887600


   Code(s): D72.829 - ELEVATED WHITE BLOOD CELL COUNT, UNSPECIFIED   Status: 

Acute   Priority: High   Current Visit: Yes   


Qualifiers: 


   Leukocytosis type: unspecified   Qualified Code(s): D72.829 - Elevated white 

blood cell count, unspecified   





(13) Nausea and vomiting


SNOMED Code(s): 73184751


   Code(s): R11.2 - NAUSEA WITH VOMITING, UNSPECIFIED   Status: Resolved   

Priority: High   Current Visit: Yes   


Qualifiers: 


   Vomiting type: unspecified   Vomiting Intractability: intractable   

Qualified Code(s): R11.2 - Nausea with vomiting, unspecified   





(14) Obesity


SNOMED Code(s): 877925492, 106489267


   Code(s): E66.9 - OBESITY, UNSPECIFIED   Status: Chronic   Priority: Medium   

Current Visit: No   


Qualifiers: 


   Obesity type: unspecified obesity type   Obesity classification: adult class 

2 (BMI 35 - 39.9)   Body mass index: BMI 38.0-38.9 





(15) S/P laparotomy


SNOMED Code(s): 246113840, 486967648, 496524773


   Code(s): Z98.890 - OTHER SPECIFIED POSTPROCEDURAL STATES   Status: Acute   

Priority: Medium   Current Visit: Yes   





(16) Small bowel obstruction


SNOMED Code(s): 576171394


   Code(s): K56.609 - UNSP INTESTNL OBST, UNSP AS TO PARTIAL VERSUS COMPLETE 

OBST   Status: Acute   Priority: High   Current Visit: Yes   





(17) Urinary tract infection


SNOMED Code(s): 44164834


   Code(s): N39.0 - URINARY TRACT INFECTION, SITE NOT SPECIFIED   Status: Acute

   Priority: High   Current Visit: Yes   


Qualifiers: 


   Urinary tract infection type: site unspecified   Hematuria presence: with 

hematuria   Qualified Code(s): N39.0 - Urinary tract infection, site not 

specified; R31.9 - Hematuria, unspecified   





(18) Volume depletion


SNOMED Code(s): 398612946


   Code(s): E86.9 - VOLUME DEPLETION, UNSPECIFIED   Status: Acute   Current 

Visit: Yes   





- Problem List Review


Problem List Initiated/Reviewed/Updated: Yes





- Plan


Plan:: 


I/P:





Acute:





SBO/Ileus


   -Last BM was 3/25/20


   -History of chronic constipation, laparotomy on 3/18/20 and discharged on 3/

20/20


   -Reported significant abdominal cancer, ovarian primary


   -Reported N/V prior to coming to ED; Nausea and emesis returned 3/29/20


   -Abdominal X-ray shows distended small bowel


   -NG tube placed at low intermittent suction on admission --> output 1,703mL 

in 24 hours


   -NPO day 4


   -Ambulate


   -Dr Burnett, general surgeon consulted -> Signed off due to nothing surgical 

to offer 


   -IV fluids as ordered


   -Q6hr blood glucose checks


   -Scheduled Phenergan x3 doses --> resolved nausea


   -Consider starting TPN, pending discussion with family


   -Start Octreotide


   


Urinary tract infection--> completed treatment


   -Reports hematuria on admission --> Recently underwent surgery at [facility] - martinez catheter placed during procedure


   -Denies any fever, Tmax 98.4


   -Pathologic UA + Leukocytosis on admission + elevated CRP --> No sepsis 

criteria on admission


   -Urine culture: Pan sensitive Klebsiella--> completed treatment


   -Blood cultures negative 


   -Started on Rocephin, completed 5 days





Nausea and vomiting, 2/2 SBO


   -Reports history of significant post-operative nausea and vomiting since 

surgery


   -IVF and zofran--> minimal improvement--> TID phenergan resolved symptoms


   -Scopolamine patch 


   -Monitor electrolytes - good so far


   -Vomited 3/29/20


   


S/P laparotomy 2/2 planned colon resection


   -Performed at [facility]


   -Daughter reports procedure aborted once she was opened due to significance 

of neoplasm


      -Planning chemotherapy in [place]


   -No signs of infection of surgical site


   -Continue to monitor 





Acute kidney injury, stable


   -Likely 2/2 dehydration from vomiting


   -BUN 27-->27-->22-->20-->16-->16-->12


   -Creatinine 1.3-->1.2-->1.2-->1.2-->1.1-->1.1


   -eGFR 39-->43-->43-->43-->48-->48


   -IV bolus given in ED


   -IV fluids as ordered





Volume depletion


   -TRINO as above


   -Concentrated urine


   -Anion gap 17.8-->15.4-->14.1-->17.1-->14.4-->13-->11


   -IV fluids as indicated





Chronic:


prior VTE


HLD


chronic constipation


GERD


obesity


colon 


Hx/o ovarian cancer


S/P oophorectomy in 2007





Plan:


Admit to medical floor


Routine AM labs


Other orders as indicated above


Review home medications 


PT/OT


CM/SW for discharge planning


Dietician consult


DVT prophylaxis: Home Eliquis


Code status: Full Code


PCP: Dr. Patel


Discharge plan: LOS >96 Hr due to new onset SBO 





Dr. Griggs discussed case with patients [facility] surgeon and patients 

oncologist in [place]. They suggested medical management and if unable to 

resolve symptoms, patient would be a very poor surgical candidate. Also 

suggested patient would not be candidate for chemotherapy. Suggested palliative 

care if medical management fails. Discussed this with family. Also discussed 

TPN supplementation. Patient and family will discuss these options and let us 

know plan.

## 2020-04-02 NOTE — CR
Abdomen: Supine view of the abdomen was obtained.

 

Comparison: Prior abdominal x-ray of 03/29/20.

 

Gas dilated small bowel loops are seen within the upper abdomen.  

Nasogastric tube is in place with tip lying within the expected region

 of the stomach.  Bony structures are grossly intact.  Inferior vena 

cava filter is noted.

 

Impression:

1.  Continuing gas dilated loops of small bowel within the upper 

abdomen.  No change is appreciated from previous study.

2.  Nasogastric tube is noted.

 

Diagnostic code #3

 

Study was dictated in MDT

## 2020-04-03 NOTE — PCM.PN
- General Info


Date of Service: 04/03/20


Admission Dx/Problem (Free Text): 


 Admission Diagnosis/Problem





Admission Diagnosis/Problem      Urinary tract infection





79 year old female admitted on 3/26/2020 with complaints of abdominal pain 

after she was hospitalized and had surgery to remove her uterus and colon 

lesions at [facility] on 3/18/2020


Subjective Update: 





She slept well through the night


She is NPO


She has a NG tube placed


She is walking with a walker as tolerated x1 assist





Discussed that she is allowed to have ice chips


Explained octreotide to her since she was refusing because she didn't 

understand it


Discussed getting labs of CMP, Mag, and Phos


Functional Status: Reports: Pain Controlled





- Review of Systems


General: Reports: Weakness


HEENT: Reports: No Symptoms, Glasses


Pulmonary: Reports: No Symptoms


Cardiovascular: Reports: No Symptoms


Gastrointestinal: Reports: No Symptoms


Genitourinary: Reports: No Symptoms


Musculoskeletal: Reports: No Symptoms


Skin: Reports: No Symptoms


Neurological: Reports: Weakness


Psychiatric: Reports: No Symptoms





- Patient Data


Vitals - Most Recent: 


 Last Vital Signs











Temp  97.9 F   04/03/20 15:40


 


Pulse  97   04/03/20 15:40


 


Resp  16   04/03/20 15:40


 


BP  119/93 H  04/03/20 15:40


 


Pulse Ox  96   04/03/20 16:00











Weight - Most Recent: 112.763 kg


I&O - Last 24 Hours: 


 Intake & Output











 04/03/20 04/03/20 04/03/20





 06:59 14:59 22:59


 


Intake Total 703  467


 


Output Total   1050


 


Balance 703  -583











Lab Results Last 24 Hours: 


 Laboratory Results - last 24 hr











  04/02/20 04/03/20 04/03/20 Range/Units





  23:22 05:29 10:53 


 


POC Glucose  99  96  105  ()  mg/dL











Sa[facility]l Results Last 24 Hours: 


 Microbiology











 03/26/20 15:18 Aerobic Blood Culture - Preliminary





 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





 Anaerobic Blood Culture - Final





    NO GROWTH AFTER 7 DAYS


 


 03/26/20 15:35 Aerobic Blood Culture - Final





 Blood - Venous - Lab Draw    NO GROWTH AFTER 7 DAYS





 Anaerobic Blood Culture - Final





    NO GROWTH AFTER 7 DAYS











Med Orders - Current: 


 Current Medications





Acetaminophen (Tylenol)  650 mg PO Q4H PRN


   PRN Reason: Pain (Mild 1-3)/fever


   Last Admin: 03/28/20 05:07 Dose:  650 mg


Benzocaine (Hurricaine 20% Spray)  0 ml MUCMEM Q2H PRN


   PRN Reason: Sore Throat


   Last Admin: 03/31/20 15:19 Dose:  1 spray


Benzocaine/Menthol (Cepacol Sore Throat)  1 lozenge MUCMEM Q2HR PRN


   PRN Reason: Sore Throat


Dextrose/Water (Dextrose 50% In Water)  50 ml IVPUSH ASDIRECTED PRN


   PRN Reason: Hypoglycemia


Enoxaparin Sodium (Lovenox)  110 mg SUBCUT Q12H [facility]


   Last Admin: 04/03/20 08:49 Dose:  110 mg


Dextrose/Lactated Ringer's (Dextrose 5%-Lactated Ringers)  1,000 mls @ 50 mls/

hr IV ASDIRECTED [facility]


   Last Admin: 04/03/20 05:16 Dose:  50 mls/hr


Magnesium Hydroxide (Milk Of Magnesia)  30 ml PO BID PRN


   PRN Reason: Constipation


   Last Admin: 03/29/20 05:17 Dose:  30 ml


Melatonin (Melatonin)  6 mg PO BEDTIME PRN


   PRN Reason: Sleep


   Last Admin: 04/02/20 21:06 Dose:  6 mg


Miscellaneous Information (Remove Patch)  1 ea TRDERM Q72H [facility]


   Last Admin: 04/01/20 20:56 Dose:  Not Given


Octreotide Acetate (Octreotide)  100 mcg SUBCUT TID [facility]


   Last Admin: 04/03/20 15:46 Dose:  100 mcg


Ondansetron HCl (Zofran)  4 mg IV Q6H PRN


   PRN Reason: Nausea/Vomiting


   Last Admin: 03/29/20 23:02 Dose:  4 mg


Scopolamine (Transderm-Scop)  1.5 mg TRDERM Q72H [facility]


   Last Admin: 04/01/20 20:58 Dose:  1.5 mg


Senna/Docusate Sodium (Senna Plus)  1 tab PO BID [facility]


   Last Admin: 04/03/20 08:49 Dose:  1 tab


Simethicone (Simethicone)  80 mg PO QID PRN


   PRN Reason: bloating 


   Last Admin: 03/31/20 20:54 Dose:  80 mg


Sodium Chloride (Saline Flush)  10 ml FLUSH ASDIRECTED PRN


   PRN Reason: Keep Vein Open


   Last Admin: 03/26/20 12:18 Dose:  10 ml


Tramadol HCl (Ultram)  50 mg PO Q6H PRN


   PRN Reason: Pain


   Last Admin: 04/02/20 21:06 Dose:  50 mg





Discontinued Medications





Al Hydroxide/Mg Hydroxide (Mag-Al Plus)  30 ml PO ONETIME ONE


   Stop: 03/26/20 13:42


   Last Admin: 03/26/20 13:47 Dose:  30 ml


Apixaban (Eliquis)  5 mg PO BID [facility]


   Last Admin: 03/29/20 08:45 Dose:  5 mg


Bisacodyl (Dulcolax)  10 mg RECTAL ONETIME ONE


   Stop: 04/01/20 11:55


   Last Admin: 04/01/20 16:35 Dose:  Not Given


Bisacodyl (Dulcolax)  10 mg RECTAL ONETIME ONE


   Stop: 04/02/20 08:18


   Last Admin: 04/02/20 09:58 Dose:  10 mg


Calcium Polycarbophil (Fibercon)  1,250 mg PO DAILY [facility]


   Last Admin: 03/30/20 11:01 Dose:  Not Given


Cefdinir (Omnicef)  300 mg PO BID [facility]


   Last Admin: 03/29/20 08:45 Dose:  300 mg


Famotidine (Pepcid)  20 mg IVPUSH ONETIME ONE


   Stop: 03/26/20 14:33


   Last Admin: 03/26/20 14:38 Dose:  20 mg


Famotidine (Pepcid)  20 mg PO DAILY [facility]


   Last Admin: 03/28/20 08:14 Dose:  20 mg


Famotidine (Pepcid)  20 mg PO BID [facility]


   Last Admin: 03/29/20 08:45 Dose:  20 mg


Famotidine (Pepcid)  20 mg IVPUSH DAILY [facility]


Famotidine (Pepcid)  20 mg IVPUSH DAILY [facility]


   Last Admin: 04/03/20 08:49 Dose:  20 mg


Furosemide (Lasix)  20 mg IVPUSH NOW ONE


   Stop: 04/02/20 08:03


   Last Admin: 04/02/20 08:34 Dose:  10 mg


Furosemide (Lasix)  40 mg IVPUSH NOW ONE


   Stop: 04/03/20 10:16


   Last Admin: 04/03/20 10:38 Dose:  40 mg


Sodium Chloride (Normal Saline)  1,000 mls @ 999 mls/hr IV ASDIRECTED [facility]


   Last Admin: 03/26/20 12:18 Dose:  150 mls/hr


Ceftriaxone Sodium 1 gm/ (Sodium Chloride)  100 mls @ 200 mls/hr IV Q24H [facility]


   Last Admin: 03/27/20 14:58 Dose:  200 mls/hr


Sodium Chloride (Normal Saline)  1,000 mls @ 75 mls/hr IV ASDIRECTED [facility]


   Stop: 03/27/20 05:49


   Last Admin: 03/26/20 16:47 Dose:  75 mls/hr


Promethazine HCl 25 mg/ Sodium (Chloride)  51 mls @ 100 mls/hr IV ONETIME ONE


   Stop: 03/26/20 18:09


   Last Admin: 03/26/20 18:32 Dose:  100 mls/hr


Promethazine HCl 25 mg/ Sodium (Chloride)  51 mls @ 100 mls/hr IV Q6H PRN


   PRN Reason: Nausea/Vomiting


Sodium Chloride (Normal Saline)  1,000 mls @ 100 mls/hr IV ASDIRECTED [facility]


   Stop: 03/28/20 17:29


   Last Admin: 03/27/20 17:44 Dose:  100 mls/hr


Dextrose/Lactated Ringer's (Dextrose 5%-Lactated Ringers)  1,000 mls @ 150 mls/

hr IV ASDIRECTED [facility]


   Last Admin: 03/31/20 07:30 Dose:  150 mls/hr


Ceftriaxone Sodium 1 gm/ (Sodium Chloride)  100 mls @ 200 mls/hr IV Q24H [facility]


   Last Admin: 03/30/20 11:40 Dose:  200 mls/hr


Lactated Ringer's (Ringers, Lactated)  1,000 mls @ 150 mls/hr IV ASDIRECTED [facility]


   Last Admin: 03/31/20 17:32 Dose:  150 mls/hr


Dextrose/Lactated Ringer's (Dextrose 5%-Lactated Ringers)  1,000 mls @ 150 mls/

hr IV ASDIRECTED [facility]


   Last Admin: 04/01/20 20:52 Dose:  150 mls/hr


Metoclopramide HCl (Reglan)  10 mg IVPUSH Q6H [facility]


   Stop: 04/01/20 21:16


   Last Admin: 04/01/20 12:12 Dose:  Not Given


Metoclopramide HCl (Reglan)  10 mg IVPUSH Q6H [facility]


   Stop: 04/01/20 23:01


   Last Admin: 04/01/20 23:15 Dose:  10 mg


Multivitamins (Thera)  1 each PO DAILY [facility]


   Last Admin: 03/29/20 08:46 Dose:  Not Given


Ondansetron HCl (Zofran)  4 mg IVPUSH ONETIME ONE


   Stop: 03/26/20 12:09


   Last Admin: 03/26/20 12:18 Dose:  4 mg


Ondansetron HCl (Zofran)  4 mg IVPUSH ONETIME ONE


   Stop: 03/26/20 14:31


   Last Admin: 03/26/20 14:38 Dose:  4 mg


Ondansetron HCl (Zofran Odt)  4 mg PO Q12H [facility]


   Last Admin: 03/29/20 00:47 Dose:  4 mg


Senna/Docusate Sodium (Senna Plus)  1 tab PO BID PRN


   PRN Reason: Constipation


   Last Admin: 03/28/20 05:07 Dose:  1 tab











- Exam


General: Alert, Oriented


HEENT: Pupils Equal, Pupils Reactive, EOMI, Mucous Membr. Moist/Pink


Neck: Supple


Lungs: Clear to Auscultation, Normal Respiratory Effort


Cardiovascular: Regular Rate, Regular Rhythm


GI/Abdominal Exam: Soft, Non-Tender, No Organomegaly, No Distention, No 

Abnormal Bruit, No Mass, Pelvis Stable, Abnormal Bowel Sounds (no bowel sounds)


 (Female) Exam: Normal External Exam, Normal Speculum Exam, Normal Bimanual 

Exam


Back Exam: Normal Inspection, Full Range of Motion


Extremities: Normal Inspection, Normal Range of Motion, Non-Tender, No Pedal 

Edema, Normal Capillary Refill


Skin: Warm, Dry, Intact


Wound/Incisions: Healing Well


Neurological: No New Focal Deficit


Psy/Mental Status: Alert, Normal Affect, Normal Mood





Sepsis Event Note





- Evaluation


Sepsis Screening Result: No Definite Risk





- Focused Exam


Vital Signs: 


 Vital Signs











  Temp Temp Pulse Pulse Resp BP BP


 


 04/03/20 16:00       


 


 04/03/20 15:40  97.9 F   97   16  119/93 H 


 


 04/03/20 13:23   98.2 F   93  16   108/62


 


 04/03/20 12:00   97 F   87  16   117/48 L


 


 04/03/20 07:09  97.0 F   84   16  126/60 














  Pulse Ox Pulse Ox


 


 04/03/20 16:00  96  96


 


 04/03/20 15:40  96 


 


 04/03/20 13:23  90 L 


 


 04/03/20 12:00  92 L 


 


 04/03/20 07:09  95 











Date Exam was Performed: 04/03/20


Time Exam was Performed: 19:32





- Problem List & Annotations


(1) Ovarian cancer


SNOMED Code(s): 435031258


   Code(s): C56.9 - MALIGNANT NEOPLASM OF UNSPECIFIED OVARY   Status: Acute   

Priority: High   Current Visit: Yes   Onset Date: 03/31/20   


Qualifiers: 


   Laterality: unspecified laterality   Qualified Code(s): C56.9 - Malignant 

neoplasm of unspecified ovary   





(2) S/P laparotomy


SNOMED Code(s): 939687958, 307452966, 380295976


   Code(s): Z98.890 - OTHER SPECIFIED POSTPROCEDURAL STATES   Status: Acute   

Priority: Medium   Current Visit: Yes   Onset Date: 03/31/20   Annotation/

Comment:: ileus  not  resolving  and will  start  octreotide  and cont  reglan  

today    





(3) Small bowel obstruction


SNOMED Code(s): 397322970


   Code(s): K56.609 - UNSP INTESTNL OBST, UNSP AS TO PARTIAL VERSUS COMPLETE 

OBST   Status: Acute   Priority: High   Current Visit: Yes   Onset Date: 03/31/ 20   Annotation/Comment:: discussed  with  herself and family and minimal  abd  

pain and cont   cons.  treatment add  octreotide.n.g   and  iv  both  decreased 

/  i/os  neg  1000    





(4) Urinary tract infection


SNOMED Code(s): 42695462


   Code(s): N39.0 - URINARY TRACT INFECTION, SITE NOT SPECIFIED   Status: Acute

   Priority: Low   Current Visit: Yes   Onset Date: 03/31/20   


Qualifiers: 


   Urinary tract infection type: site unspecified   Hematuria presence: with 

hematuria   Qualified Code(s): N39.0 - Urinary tract infection, site not 

specified; R31.9 - Hematuria, unspecified   


Annotation/Comment:: klebsiella   on  day  3    treatment    





- Problem List Review


Problem List Initiated/Reviewed/Updated: Yes





- My Orders


Last 24 Hours: 


My Active Orders





04/04/20 05:00


CMP [COMPREHENSIVE METABOLIC PN,CMP] [CHEM] Routine 


MAGNESIUM [CHEM] Routine 


PHOSPHORUS [CHEM] Routine 














- Assessment


Assessment:: 





4/3/2020


She slept well through the night


She is NPO


She has a NG tube placed


She is walking with a walker as tolerated x1 assist





Discussed that she is allowed to have ice chips


Explained octreotide to her since she was refusing because she didn't 

understand it


Discussed getting labs of CMP, Mag, and Phos





- Plan


Plan:: 





4/3/2020


She slept well through the night


She is NPO


She has a NG tube placed


She is walking with a walker as tolerated x1 assist





Discussed that she is allowed to have ice chips


Explained octreotide to her since she was refusing because she didn't 

understand it


Discussed getting labs of CMP, Mag, and Phos

## 2020-04-04 RX ADMIN — DEXTROSE SCH MLS/HR: 10 SOLUTION INTRAVENOUS at 23:44

## 2020-04-04 RX ADMIN — Medication SCH EA: at 23:44

## 2020-04-04 RX ADMIN — SCOPALAMINE SCH MG: 1 PATCH, EXTENDED RELEASE TRANSDERMAL at 20:27

## 2020-04-04 NOTE — PCM.PN
- General Info


Date of Service: 04/04/20


Admission Dx/Problem (Free Text): 


 Admission Diagnosis/Problem





Admission Diagnosis/Problem      Urinary tract infection





79 year old female admitted on 3/26/2020 with complaints of abdominal pain 

after she was hospitalized and had surgery to remove her uterus and colon 

lesions at [facility] on 3/18/2020


Subjective Update: 


4/4/20


 


afebrile  


vss


  ng output  250  cc  last  12  hours.


  no abd  pain  unless  moving and  achey .passing  little  gas .  no  b.m 


p.e.  unchanged  and  no  tenderness and  clear  greenish  fluid  in   n.g. 

tube .


lab  mild  elavation  of  lfts with  stable  creatinine. cbc  unchanged.


kub   persistant  ileus  partially  resolved.


no  air  fluid   levels .


 minimal   tenderness  or  none. 





 assess:


1) ileus  continues   but  slow  improvement over  6  days  of  n.g  suction.  

will clamp  tube  every  2  hours  and see if  she   tolerates.


 on  octratide and  reglan and  tolerating well.


2)ovarian  cancer  widely  metastatic and  patient  desires  to  go  home .  

home  suction  devices  being  sought   by  family .


 3) npo x  6  days and will need to  consider  tpn for   chronic  nutrition  

needs and   she is  discussing  that with family .  would  need a  gil cath  

placed and she is not  sure  she  wants to  do   that  or  tpn.  supportive  

care and  hospice  brought up as  options  as well .]\chemo therapy  options   

discussed over  phone  with  oncology and they  do not  think  she   can  

tolerate  but  would  see her  back  to  discuss in    further  detail .  no  

plans  for  chemo  emergantly  as  she  has   incision  to  heal up .


functionally  doing  well  and  incision  looks   good / 


 abd  pain  minmal and walking and control of  bowels and bladder  good. 


no signs  infection  or  cardiac  issues   but she is  obese. 


  





                                                                               

           boh 


Functional Status: Reports: Pain Controlled





- Review of Systems


General: Reports: No Symptoms


HEENT: Reports: No Symptoms


Pulmonary: Reports: No Symptoms


Cardiovascular: Reports: No Symptoms


Gastrointestinal: Reports: No Symptoms, Abdominal Pain, Decreased Appetite, 

Nausea, Vomiting


Genitourinary: Reports: No Symptoms


Musculoskeletal: Reports: No Symptoms


Skin: Reports: No Symptoms


Neurological: Reports: No Symptoms


Psychiatric: Reports: No Symptoms





- Patient Data


Vitals - Most Recent: 


 Last Vital Signs











Temp  36.8 C   04/04/20 12:04


 


Pulse  74   04/04/20 12:04


 


Resp  18   04/04/20 12:04


 


BP  97/66   04/04/20 12:04


 


Pulse Ox  95   04/04/20 12:04











Weight - Most Recent: 111.402 kg


I&O - Last 24 Hours: 


 Intake & Output











 04/03/20 04/04/20 04/04/20





 22:59 06:59 14:59


 


Intake Total 467 658 


 


Output Total 1050 650 


 


Balance -583 8 











Lab Results Last 24 Hours: 


 Laboratory Results - last 24 hr











  04/03/20 04/03/20 04/04/20 Range/Units





  18:37 23:40 05:07 


 


Sodium    146 H  (136-145)  mEq/L


 


Potassium    3.8  (3.5-5.1)  mEq/L


 


Chloride    108 H  ()  mEq/L


 


Carbon Dioxide    28  (21-32)  mEq/L


 


Anion Gap    13.8  (5-15)  


 


BUN    11  (7-18)  mg/dL


 


Creatinine    1.2 H  (0.55-1.02)  mg/dL


 


Est Cr Clr Drug Dosing    33.65  mL/min


 


Estimated GFR (MDRD)    43  (>60)  mL/min


 


BUN/Creatinine Ratio    9.2 L  (14-18)  


 


Glucose    126 H  ()  mg/dL


 


POC Glucose  127 H  146 H   ()  mg/dL


 


Calcium    8.3 L  (8.5-10.1)  mg/dL


 


Phosphorus    3.8  (2.6-4.7)  mg/dL


 


Magnesium    1.9  (1.8-2.4)  mg/dl


 


Total Bilirubin    0.4  (0.2-1.0)  mg/dL


 


AST    47 H  (15-37)  U/L


 


ALT    65 H  (14-59)  U/L


 


Alkaline Phosphatase    97  ()  U/L


 


Total Protein    6.2 L  (6.4-8.2)  g/dl


 


Albumin    2.3 L  (3.4-5.0)  g/dl


 


Globulin    3.9  gm/dL


 


Albumin/Globulin Ratio    0.6 L  (1-2)  














  04/04/20 04/04/20 Range/Units





  06:38 12:36 


 


Sodium    (136-145)  mEq/L


 


Potassium    (3.5-5.1)  mEq/L


 


Chloride    ()  mEq/L


 


Carbon Dioxide    (21-32)  mEq/L


 


Anion Gap    (5-15)  


 


BUN    (7-18)  mg/dL


 


Creatinine    (0.55-1.02)  mg/dL


 


Est Cr Clr Drug Dosing    mL/min


 


Estimated GFR (MDRD)    (>60)  mL/min


 


BUN/Creatinine Ratio    (14-18)  


 


Glucose    ()  mg/dL


 


POC Glucose  129 H  132 H  ()  mg/dL


 


Calcium    (8.5-10.1)  mg/dL


 


Phosphorus    (2.6-4.7)  mg/dL


 


Magnesium    (1.8-2.4)  mg/dl


 


Total Bilirubin    (0.2-1.0)  mg/dL


 


AST    (15-37)  U/L


 


ALT    (14-59)  U/L


 


Alkaline Phosphatase    ()  U/L


 


Total Protein    (6.4-8.2)  g/dl


 


Albumin    (3.4-5.0)  g/dl


 


Globulin    gm/dL


 


Albumin/Globulin Ratio    (1-2)  











Med Orders - Current: 


 Current Medications





Acetaminophen (Tylenol)  650 mg PO Q4H PRN


   PRN Reason: Pain (Mild 1-3)/fever


   Last Admin: 03/28/20 05:07 Dose:  650 mg


Benzocaine (Hurricaine 20% Spray)  0 ml MUCMEM Q2H PRN


   PRN Reason: Sore Throat


   Last Admin: 03/31/20 15:19 Dose:  1 spray


Benzocaine/Menthol (Cepacol Sore Throat)  1 lozenge MUCMEM Q2HR PRN


   PRN Reason: Sore Throat


Dextrose/Water (Dextrose 50% In Water)  50 ml IVPUSH ASDIRECTED PRN


   PRN Reason: Hypoglycemia


Enoxaparin Sodium (Lovenox)  110 mg SUBCUT Q12H [facility]


   Last Admin: 04/04/20 09:03 Dose:  110 mg


Dextrose/Lactated Ringer's (Dextrose 5%-Lactated Ringers)  1,000 mls @ 50 mls/

hr IV ASDIRECTED [facility]


   Last Admin: 04/04/20 02:56 Dose:  50 mls/hr


Magnesium Hydroxide (Milk Of Magnesia)  30 ml PO BID PRN


   PRN Reason: Constipation


   Last Admin: 03/29/20 05:17 Dose:  30 ml


Melatonin (Melatonin)  6 mg PO BEDTIME PRN


   PRN Reason: Sleep


   Last Admin: 04/03/20 20:56 Dose:  6 mg


Miscellaneous Information (Remove Patch)  1 ea TRDERM Q72H [facility]


   Last Admin: 04/01/20 20:56 Dose:  Not Given


Octreotide Acetate (Octreotide)  100 mcg SUBCUT TID [facility]


   Last Admin: 04/04/20 09:02 Dose:  100 mcg


Ondansetron HCl (Zofran)  4 mg IV Q6H PRN


   PRN Reason: Nausea/Vomiting


   Last Admin: 03/29/20 23:02 Dose:  4 mg


Scopolamine (Transderm-Scop)  1.5 mg TRDERM Q72H [facility]


   Last Admin: 04/01/20 20:58 Dose:  1.5 mg


Senna/Docusate Sodium (Senna Plus)  1 tab PO BID [facility]


   Last Admin: 04/04/20 09:02 Dose:  1 tab


Simethicone (Simethicone)  80 mg PO QID PRN


   PRN Reason: bloating 


   Last Admin: 03/31/20 20:54 Dose:  80 mg


Sodium Chloride (Saline Flush)  10 ml FLUSH ASDIRECTED PRN


   PRN Reason: Keep Vein Open


   Last Admin: 03/26/20 12:18 Dose:  10 ml


Tramadol HCl (Ultram)  50 mg PO Q6H PRN


   PRN Reason: Pain


   Last Admin: 04/03/20 20:56 Dose:  50 mg





Discontinued Medications





Al Hydroxide/Mg Hydroxide (Mag-Al Plus)  30 ml PO ONETIME ONE


   Stop: 03/26/20 13:42


   Last Admin: 03/26/20 13:47 Dose:  30 ml


Apixaban (Eliquis)  5 mg PO BID [facility]


   Last Admin: 03/29/20 08:45 Dose:  5 mg


Bisacodyl (Dulcolax)  10 mg RECTAL ONETIME ONE


   Stop: 04/01/20 11:55


   Last Admin: 04/01/20 16:35 Dose:  Not Given


Bisacodyl (Dulcolax)  10 mg RECTAL ONETIME ONE


   Stop: 04/02/20 08:18


   Last Admin: 04/02/20 09:58 Dose:  10 mg


Calcium Polycarbophil (Fibercon)  1,250 mg PO DAILY [facility]


   Last Admin: 03/30/20 11:01 Dose:  Not Given


Cefdinir (Omnicef)  300 mg PO BID [facility]


   Last Admin: 03/29/20 08:45 Dose:  300 mg


Famotidine (Pepcid)  20 mg IVPUSH ONETIME ONE


   Stop: 03/26/20 14:33


   Last Admin: 03/26/20 14:38 Dose:  20 mg


Famotidine (Pepcid)  20 mg PO DAILY [facility]


   Last Admin: 03/28/20 08:14 Dose:  20 mg


Famotidine (Pepcid)  20 mg PO BID [facility]


   Last Admin: 03/29/20 08:45 Dose:  20 mg


Famotidine (Pepcid)  20 mg IVPUSH DAILY [facility]


Famotidine (Pepcid)  20 mg IVPUSH DAILY [facility]


   Last Admin: 04/03/20 08:49 Dose:  20 mg


Furosemide (Lasix)  20 mg IVPUSH NOW ONE


   Stop: 04/02/20 08:03


   Last Admin: 04/02/20 08:34 Dose:  10 mg


Furosemide (Lasix)  40 mg IVPUSH NOW ONE


   Stop: 04/03/20 10:16


   Last Admin: 04/03/20 10:38 Dose:  40 mg


Sodium Chloride (Normal Saline)  [facility],000 mls [facility]


   Last Admin: 03/26/20 12:18 Dose:  150 mls/hr


Ceftriaxone Sodium 1 gm/ (Sodium Chloride)  100 mls @ 200 mls/hr IV Q24H [facility]


   Last Admin: 03/27/20 14:58 Dose:  200 mls/hr


Sodium Chloride (Normal Saline)  1,000 mls @ 75 mls/hr IV ASDIRECTED [facility]


   Stop: 03/27/20 05:49


   Last Admin: 03/26/20 16:47 Dose:  75 mls/hr


Promethazine HCl 25 mg/ Sodium (Chloride)  51 mls @ 100 mls/hr IV ONETIME ONE


   Stop: 03/26/20 18:09


   Last Admin: 03/26/20 18:32 Dose:  100 mls/hr


Promethazine HCl 25 mg/ Sodium (Chloride)  51 mls @ 100 mls/hr IV Q6H PRN


   PRN Reason: Nausea/Vomiting


Sodium Chloride (Normal Saline)  1,000 mls @ 100 mls/hr IV ASDIRECTED [facility]


   Stop: 03/28/20 17:29


   Last Admin: 03/27/20 17:44 Dose:  100 mls/hr


Dextrose/Lactated Ringer's (Dextrose 5%-Lactated Ringers)  1,000 mls @ 150 mls/

hr IV ASDIRECTED [facility]


   Last Admin: 03/31/20 07:30 Dose:  150 mls/hr


Ceftriaxone Sodium 1 gm/ (Sodium Chloride)  100 mls @ 200 mls/hr IV Q24H [facility]


   Last Admin: 03/30/20 11:40 Dose:  200 mls/hr


Lactated Ringer's (Ringers, Lactated)  1,000 mls @ 150 mls/hr IV ASDIRECTED [facility]


   Last Admin: 03/31/20 17:32 Dose:  150 mls/hr


Dextrose/Lactated Ringer's (Dextrose 5%-Lactated Ringers)  1,000 mls @ 150 mls/

hr IV ASDIRECTED [facility]


   Last Admin: 04/01/20 20:52 Dose:  150 mls/hr


Metoclopramide HCl (Reglan)  10 mg IVPUSH Q6H [facility]


   Stop: 04/01/20 21:16


   Last Admin: 04/01/20 12:12 Dose:  Not Given


Metoclopramide HCl (Reglan)  10 mg IVPUSH Q6H [facility]


   Stop: 04/01/20 23:01


   Last Admin: 04/01/20 23:15 Dose:  10 mg


Multivitamins (Thera)  1 each PO DAILY [facility]


   Last Admin: 03/29/20 08:46 Dose:  Not Given


Ondansetron HCl (Zofran)  4 mg IVPUSH ONETIME ONE


   Stop: 03/26/20 12:09


   Last Admin: 03/26/20 12:18 Dose:  4 mg


Ondansetron HCl (Zofran)  4 mg IVPUSH ONETIME ONE


   Stop: 03/26/20 14:31


   Last Admin: 03/26/20 14:38 Dose:  4 mg


Ondansetron HCl (Zofran Odt)  4 mg PO Q12H [facility]


   Last Admin: 03/29/20 00:47 Dose:  4 mg


Senna/Docusate Sodium (Senna Plus)  1 tab PO BID PRN


   PRN Reason: Constipation


   Last Admin: 03/28/20 05:07 Dose:  1 tab











- Exam


General: Alert, Oriented


HEENT: Pupils Equal, Pupils Reactive, EOMI, Mucous Membr. Moist/Pink


Neck: Supple


Lungs: Clear to Auscultation, Normal Respiratory Effort


Cardiovascular: Regular Rate, Regular Rhythm


GI/Abdominal Exam: Normal Bowel Sounds, Soft, Non-Tender, No Organomegaly, No 

Distention, No Abnormal Bruit, No Mass, Pelvis Stable


 (Female) Exam: Normal External Exam, Normal Speculum Exam, Normal Bimanual 

Exam


Back Exam: Normal Inspection, Full Range of Motion


Extremities: Normal Inspection, Normal Range of Motion, Non-Tender, No Pedal 

Edema, Normal Capillary Refill


Skin: Warm, Dry, Intact


Wound/Incisions: Healing Well


Neurological: No New Focal Deficit


Psy/Mental Status: Alert, Normal Affect, Normal Mood





Sepsis Event Note





- Evaluation


Sepsis Screening Result: No Definite Risk





- Focused Exam


Vital Signs: 


 Vital Signs











  Temp Pulse Resp BP Pulse Ox


 


 04/04/20 12:04  36.8 C  74  18  97/66  95


 


 04/04/20 07:58  37.0 C  79  18  121/80  90 L


 


 04/04/20 05:02  37.0 C  78  18  110/68  91 L











Date Exam was Performed: 04/04/20


Time Exam was Performed: 13:14





- Problem List & Annotations


(1) Ovarian cancer


SNOMED Code(s): 920912807


   Code(s): C56.9 - MALIGNANT NEOPLASM OF UNSPECIFIED OVARY   Status: Acute   

Priority: High   Current Visit: Yes   Onset Date: 03/31/20   


Qualifiers: 


   Laterality: unspecified laterality   Qualified Code(s): C56.9 - Malignant 

neoplasm of unspecified ovary   





(2) S/P laparotomy


SNOMED Code(s): 084867344, 897100008, 404703737


   Code(s): Z98.890 - OTHER SPECIFIED POSTPROCEDURAL STATES   Status: Acute   

Priority: Medium   Current Visit: Yes   Onset Date: 03/31/20   Annotation/

Comment:: ileus  not  resolving  and will  start  octreotide  and cont  reglan  

today .


4/4/20


 doing   well   tolerating  meds  but  little  improvemtn other than n.g  

output  less.   





(3) Small bowel obstruction


SNOMED Code(s): 571571352


   Code(s): K56.609 - UNSP INTESTNL OBST, UNSP AS TO PARTIAL VERSUS COMPLETE 

OBST   Status: Acute   Priority: High   Current Visit: Yes   Onset Date: 03/31/ 20   Annotation/Comment:: discussed  with  herself and family and minimal  abd  

pain and cont   cons.  treatment add  octreotide.n.g   and  iv  both  decreased 

/  i/os  neg  1000    





(4) Urinary tract infection


SNOMED Code(s): 99071005


   Code(s): N39.0 - URINARY TRACT INFECTION, SITE NOT SPECIFIED   Status: Acute

   Priority: Low   Current Visit: Yes   Onset Date: 03/31/20   


Qualifiers: 


   Urinary tract infection type: site unspecified   Hematuria presence: with 

hematuria   Qualified Code(s): N39.0 - Urinary tract infection, site not 

specified; R31.9 - Hematuria, unspecified   


Annotation/Comment:: klebsiella   on  day  3    treatment.


  4/4/20


  day  4  and  no  symptoms .no  catheter    





- Problem List Review


Problem List Initiated/Reviewed/Updated: Yes





- My Orders


Last 24 Hours: 


My Active Orders





04/04/20 12:55


Communication Order [RC] ASDIRECTED 














- Assessment


Assessment:: 





4/3/2020


She slept well through the night


She is NPO


She has a NG tube placed


She is walking with a walker as tolerated x1 assist





Discussed that she is allowed to have ice chips


Explained octreotide to her since she was refusing because she didn't 

understand it


Discussed getting labs of CMP, Mag, and Phos4/4/20


 


afebrile  


vss


  ng output  250  cc  last  12  hours.


  no abd  pain  unless  moving and  achey .passing  little  gas .  no  b.m 


p.e.  unchanged  and  no  tenderness and  clear  greenish  fluid  in   n.g. 

tube .


lab  mild  elavation  of  lfts with  stable  creatinine. cbc  unchanged.


kub   persistant  ileus  partially  resolved.


no  air  fluid   levels .


 minimal   tenderness  or  none. 





 assess:


1) ileus  continues   but  slow  improvement over  6  days  of  n.g  suction.  

will clamp  tube  every  2  hours  and see if  she   tolerates.


 on  octratide and  reglan and  tolerating well.


2)ovarian  cancer  widely  metastatic and  patient  desires  to  go  home .  

home  suction  devices  being  sought   by  family .


 3) npo x  6  days and will need to  consider  tpn for   chronic  nutrition  

needs and   she is  discussing  that with family .  would  need a  gil cath  

placed and she is not  sure  she  wants to  do   that  or  tpn.  supportive  

care and  hospice  brought up as  options  as well .]\chemo therapy  options   

discussed over  phone  with  oncology and they  do not  think  she   can  

tolerate  but  would  see her  back  to  discuss in    further  detail .  no  

plans  for  chemo  emergantly  as  she  has   incision  to  heal up .


functionally  doing  well  and  incision  looks   good / 


 abd  pain  minmal and walking and control of  bowels and bladder  good. 


no signs  infection  or  cardiac  issues   but she is  obese. 


  





                                                                               

           boh 





- Plan


Plan:: 





4/3/2020


She slept well through the night


She is NPO


She has a NG tube placed


She is walking with a walker as tolerated x1 assist





Discussed that she is allowed to have ice chips


Explained octreotide to her since she was refusing because she didn't 

understand it


Discussed getting labs of CMP, Mag, and Phos4/4/20


 


afebrile  


vss


  ng output  250  cc  last  12  hours.


  no abd  pain  unless  moving and  achey .passing  little  gas .  no  b.m 


p.e.  unchanged  and  no  tenderness and  clear  greenish  fluid  in   n.g. 

tube .


lab  mild  elavation  of  lfts with  stable  creatinine. cbc  unchanged.


kub   persistant  ileus  partially  resolved.


no  air  fluid   levels .


 minimal   tenderness  or  none. 





 assess:


1) ileus  continues   but  slow  improvement over  6  days  of  n.g  suction.  

will clamp  tube  every  2  hours  and see if  she   tolerates.


 on  octratide and  reglan and  tolerating well.


2)ovarian  cancer  widely  metastatic and  patient  desires  to  go  home .  

home  suction  devices  being  sought   by  family .


 3) npo x  6  days and will need to  consider  tpn for   chronic  nutrition  

needs and   she is  discussing  that with family .  would  need a  gil cath  

placed and she is not  sure  she  wants to  do   that  or  tpn.  supportive  

care and  hospice  brought up as  options  as well .]\chemo therapy  options   

discussed over  phone  with  oncology and they  do not  think  she   can  

tolerate  but  would  see her  back  to  discuss in    further  detail .  no  

plans  for  chemo  emergantly  as  she  has   incision  to  heal up .


functionally  doing  well  and  incision  looks   good / 


 abd  pain  minmal and walking and control of  bowels and bladder  good. 


no signs  infection  or  cardiac  issues   but she is  obese. 


  





                                                                               

           boh

## 2020-04-05 RX ADMIN — SODIUM CHLORIDE PRN MG: 9 INJECTION, SOLUTION INTRAVENOUS at 12:10

## 2020-04-05 RX ADMIN — DEXTROSE SCH MLS/HR: 10 SOLUTION INTRAVENOUS at 10:27

## 2020-04-05 NOTE — PCM.PN
- General Info


Date of Service: 04/05/20


Admission Dx/Problem (Free Text): 


 Admission Diagnosis/Problem





Admission Diagnosis/Problem      Urinary tract infection





79 year old female admitted on 3/26/2020 with complaints of abdominal pain 

after she was hospitalized and had surgery to remove her uterus and colon 

lesions at [facility] on 3/18/2020


Subjective Update: 


4/4/20


 


afebrile  


vss


  ng output  250  cc  last  12  hours.


  no abd  pain  unless  moving and  achey .passing  little  gas .  no  b.m 


p.e.  unchanged  and  no  tenderness and  clear  greenish  fluid  in   n.g. 

tube .


lab  mild  elavation  of  lfts with  stable  creatinine. cbc  unchanged.


kub   persistant  ileus  partially  resolved.


no  air  fluid   levels .


 minimal   tenderness  or  none. 





 assess:


1) ileus  continues   but  slow  improvement over  6  days  of  n.g  suction.  

will clamp  tube  every  2  hours  and see if  she   tolerates.


 on  octreotide and  reglan and  tolerating well.


2)ovarian  cancer  widely  metastatic and  patient  desires  to  go  home .  

home  suction  devices  being  sought   by  family .


 3) npo x  6  days and will need to  consider  tpn for   chronic  nutrition  

needs and   she is  discussing  that with family .  would  need a  gil cath  

placed and she is not  sure  she  wants to  do   that  or  tpn.  supportive  

care and  hospice  brought up as  options  as well .]\chemo therapy  options   

discussed over  phone  with  oncology and they  do not  think  she   can  

tolerate  but  would  see her  back  to  discuss in    further  detail .  no  

plans  for  chemo  emergantly  as  she  has   incision  to  heal up .


functionally  doing  well  and  incision  looks   good / 


 abd  pain  minmal and walking and control of  bowels and bladder  good. 


no signs  infection  or  cardiac  issues   but she is  obese.     boh 


  





 4/5/20


  afebrile


vss/


vomiting  this  am  with  grossly  greenish chochlate colored  emisis.   

suction  turned  back on and  immediately  felt  better. 


on  octreotide and  reglan p.o . 


  n.g  output  550   before  emesis/  i/os   balanced .  


b.s  elavated  160.


p.e.


  unchanged  other  than  low  b.s  heard .   no  tenderness .


ambulating  well and will  try   to  maximize  reglan and trial of  rectal  

tube  discussed .  


she  agrees . 


taking   tramadol  p.o  and  known  to  slow   gastric  empting,


   but   sbo   obstruction   considered  main  problem.


pain  control  good  and  will  try  to  decrease  dose    further 





 lab  


  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   

culture.  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


 kleibsiella  in urine  treated with  rocephin x  one  dose  on  3/29 .





surgical  consult  recommends pic   line    instead  of  central line  at  this

  time.  oncology   o.p.  follow  up  with  detailed   discussion  about  chemo

  options  pending   resolution  of ileus . 





discussed  with  family  and  options  for  home   n.g .  suctioning  

intermittantly  not  known ,


 but   consulting  home  health and  family  willing    in  order to  get her  

home.    boh 


                                                                               

        


Functional Status: Reports: Pain Controlled





- Review of Systems


General: Reports: No Symptoms


HEENT: Reports: No Symptoms


Pulmonary: Reports: No Symptoms


Cardiovascular: Reports: No Symptoms


Gastrointestinal: Reports: No Symptoms, Abdominal Pain, Constipation, Vomiting, 

Other (obstruction)


Genitourinary: Reports: No Symptoms


Musculoskeletal: Reports: No Symptoms


Skin: Reports: No Symptoms


Neurological: Reports: No Symptoms


Psychiatric: Reports: No Symptoms





- Patient Data


Vitals - Most Recent: 


 Last Vital Signs











Temp  36.4 C   04/05/20 12:16


 


Pulse  84   04/05/20 12:16


 


Resp  16   04/05/20 12:16


 


BP  135/71   04/05/20 12:16


 


Pulse Ox  96   04/05/20 12:16











Weight - Most Recent: 112.037 kg


I&O - Last 24 Hours: 


 Intake & Output











 04/04/20 04/05/20 04/05/20





 22:59 06:59 14:59


 


Intake Total 568 940 


 


Output Total 320 450 


 


Balance 248 490 











Lab Results Last 24 Hours: 


 Laboratory Results - last 24 hr











  04/04/20 04/04/20 04/05/20 Range/Units





  18:03 23:54 05:50 


 


WBC    9.43  (3.98-10.04)  K/mm3


 


RBC    3.95 L  (3.98-5.22)  M/mm3


 


Hgb    11.7  (11.2-15.7)  gm/dl


 


Hct    38.6  (34.1-44.9)  %


 


MCV    97.7 H  (79.4-94.8)  fl


 


MCH    29.6  (25.6-32.2)  pg


 


MCHC    30.3 L  (32.2-35.5)  g/dl


 


RDW Std Deviation    47.0 H  (36.4-46.3)  fL


 


Plt Count    419 H  (182-369)  K/mm3


 


MPV    10.7  (9.4-12.3)  fl


 


Neut % (Auto)    56.7  (34.0-71.1)  %


 


Lymph % (Auto)    24.0  (19.3-51.7)  %


 


Mono % (Auto)    14.8 H  (4.7-12.5)  %


 


Eos % (Auto)    3.8  (0.7-5.8)  


 


Baso % (Auto)    0.4  (0.1-1.2)  %


 


Neut # (Auto)    5.34  (1.56-6.13)  K/mm3


 


Lymph # (Auto)    2.26  (1.18-3.74)  K/mm3


 


Mono # (Auto)    1.40 H  (0.24-0.36)  K/mm3


 


Eos # (Auto)    0.36  (0.04-0.36)  K/mm3


 


Baso # (Auto)    0.04  (0.01-0.08)  K/mm3


 


Sodium     (136-145)  mEq/L


 


Potassium     (3.5-5.1)  mEq/L


 


Chloride     ()  mEq/L


 


Carbon Dioxide     (21-32)  mEq/L


 


Anion Gap     (5-15)  


 


BUN     (7-18)  mg/dL


 


Creatinine     (0.55-1.02)  mg/dL


 


Est Cr Clr Drug Dosing     mL/min


 


Estimated GFR (MDRD)     (>60)  mL/min


 


BUN/Creatinine Ratio     (14-18)  


 


Glucose     ()  mg/dL


 


POC Glucose  131 H  127 H   ()  mg/dL


 


Calcium     (8.5-10.1)  mg/dL


 


Phosphorus     (2.6-4.7)  mg/dL


 


Magnesium     (1.8-2.4)  mg/dl


 


Total Bilirubin     (0.2-1.0)  mg/dL


 


AST     (15-37)  U/L


 


ALT     (14-59)  U/L


 


Alkaline Phosphatase     ()  U/L


 


Total Protein     (6.4-8.2)  g/dl


 


Albumin     (3.4-5.0)  g/dl


 


Globulin     gm/dL


 


Albumin/Globulin Ratio     (1-2)  














  04/05/20 04/05/20 04/05/20 Range/Units





  05:50 06:14 12:03 


 


WBC     (3.98-10.04)  K/mm3


 


RBC     (3.98-5.22)  M/mm3


 


Hgb     (11.2-15.7)  gm/dl


 


Hct     (34.1-44.9)  %


 


MCV     (79.4-94.8)  fl


 


MCH     (25.6-32.2)  pg


 


MCHC     (32.2-35.5)  g/dl


 


RDW Std Deviation     (36.4-46.3)  fL


 


Plt Count     (182-369)  K/mm3


 


MPV     (9.4-12.3)  fl


 


Neut % (Auto)     (34.0-71.1)  %


 


Lymph % (Auto)     (19.3-51.7)  %


 


Mono % (Auto)     (4.7-12.5)  %


 


Eos % (Auto)     (0.7-5.8)  


 


Baso % (Auto)     (0.1-1.2)  %


 


Neut # (Auto)     (1.56-6.13)  K/mm3


 


Lymph # (Auto)     (1.18-3.74)  K/mm3


 


Mono # (Auto)     (0.24-0.36)  K/mm3


 


Eos # (Auto)     (0.04-0.36)  K/mm3


 


Baso # (Auto)     (0.01-0.08)  K/mm3


 


Sodium  146 H    (136-145)  mEq/L


 


Potassium  3.8    (3.5-5.1)  mEq/L


 


Chloride  109 H    ()  mEq/L


 


Carbon Dioxide  27    (21-32)  mEq/L


 


Anion Gap  13.8    (5-15)  


 


BUN  12    (7-18)  mg/dL


 


Creatinine  1.2 H    (0.55-1.02)  mg/dL


 


Est Cr Clr Drug Dosing  33.65    mL/min


 


Estimated GFR (MDRD)  43    (>60)  mL/min


 


BUN/Creatinine Ratio  10.0 L    (14-18)  


 


Glucose  161 H    ()  mg/dL


 


POC Glucose   163 H  172 H  ()  mg/dL


 


Calcium  8.5    (8.5-10.1)  mg/dL


 


Phosphorus  3.0    (2.6-4.7)  mg/dL


 


Magnesium  1.9    (1.8-2.4)  mg/dl


 


Total Bilirubin  0.3    (0.2-1.0)  mg/dL


 


AST  40 H    (15-37)  U/L


 


ALT  58    (14-59)  U/L


 


Alkaline Phosphatase  104    ()  U/L


 


Total Protein  6.2 L    (6.4-8.2)  g/dl


 


Albumin  2.5 L    (3.4-5.0)  g/dl


 


Globulin  3.7    gm/dL


 


Albumin/Globulin Ratio  0.7 L    (1-2)  











Med Orders - Current: 


 Current Medications





Acetaminophen (Tylenol)  650 mg PO Q4H PRN


   PRN Reason: Pain (Mild 1-3)/fever


   Last Admin: 03/28/20 05:07 Dose:  650 mg


Benzocaine (Hurricaine 20% Spray)  0 ml MUCMEM Q2H PRN


   PRN Reason: Sore Throat


   Last Admin: 03/31/20 15:19 Dose:  1 spray


Benzocaine/Menthol (Cepacol Sore Throat)  1 lozenge MUCMEM Q2HR PRN


   PRN Reason: Sore Throat


Dextrose/Water (Dextrose 50% In Water)  50 ml IVPUSH ASDIRECTED PRN


   PRN Reason: Hypoglycemia


Enoxaparin Sodium (Lovenox)  110 mg SUBCUT Q12H [facility]


   Last Admin: 04/05/20 08:24 Dose:  110 mg


Sodium Chloride 76.8 meq/Potassium Chloride 20 meq/Dextrose/Water  1,029.2 mls 

@ 100 mls/hr IV .[id number] [facility]


   Last Admin: 04/05/20 10:27 Dose:  100 mls/hr


Insulin Glargine (Lantus)  25 unit SUBCUT DAILY [facility]


   Last Admin: 04/05/20 12:00 Dose:  25 units


Magnesium Hydroxide (Milk Of Magnesia)  30 ml PO BID PRN


   PRN Reason: Constipation


   Last Admin: 03/29/20 05:17 Dose:  30 ml


Melatonin (Melatonin)  6 mg PO BEDTIME PRN


   PRN Reason: Sleep


   Last Admin: 04/04/20 20:38 Dose:  6 mg


Miscellaneous Information (Remove Patch)  1 ea TRDERM Q72H [facility]


   Last Admin: 04/04/20 23:44 Dose:  1 ea


Octreotide Acetate (Octreotide)  100 mcg SUBCUT TID [facility]


   Last Admin: 04/05/20 08:24 Dose:  100 mcg


Ondansetron HCl (Zofran)  4 mg IV Q6H PRN


   PRN Reason: Nausea/Vomiting


   Last Admin: 04/05/20 12:10 Dose:  4 mg


Scopolamine (Transderm-Scop)  1.5 mg TRDERM Q72H [facility]


   Last Admin: 04/04/20 20:27 Dose:  1.5 mg


Senna/Docusate Sodium (Senna Plus)  1 tab PO BID [facility]


   Last Admin: 04/05/20 08:24 Dose:  1 tab


Simethicone (Simethicone)  80 mg PO QID PRN


   PRN Reason: bloating 


   Last Admin: 03/31/20 20:54 Dose:  80 mg


Sodium Chloride (Saline Flush)  10 ml FLUSH ASDIRECTED PRN


   PRN Reason: Keep Vein Open


   Last Admin: 03/26/20 12:18 Dose:  10 ml


Tramadol HCl (Ultram)  50 mg PO Q6H PRN


   PRN Reason: Pain


   Last Admin: 04/04/20 20:38 Dose:  50 mg





Discontinued Medications





Al Hydroxide/Mg Hydroxide (Mag-Al Plus)  30 ml PO ONETIME ONE


   Stop: 03/26/20 13:42


   Last Admin: 03/26/20 13:47 Dose:  30 ml


Apixaban (Eliquis)  5 mg PO BID [facility]


   Last Admin: 03/29/20 08:45 Dose:  5 mg


Bisacodyl (Dulcolax)  10 mg RECTAL ONETIME ONE


   Stop: 04/01/20 11:55


   Last Admin: 04/01/20 16:35 Dose:  Not Given


Bisacodyl (Dulcolax)  10 mg RECTAL ONETIME ONE


   Stop: 04/02/20 08:18


   Last Admin: 04/02/20 09:58 Dose:  10 mg


Calcium Polycarbophil (Fibercon)  1,250 mg PO DAILY [facility]


   Last Admin: 03/30/20 11:01 Dose:  Not Given


Cefdinir (Omnicef)  300 mg PO BID [facility]


   Last Admin: 03/29/20 08:45 Dose:  300 mg


Famotidine (Pepcid)  20 mg IVPUSH ONETIME ONE


   Stop: 03/26/20 14:33


   Last Admin: 03/26/20 14:38 Dose:  20 mg


Famotidine (Pepcid)  20 mg PO DAILY [facility]


   Last Admin: 03/28/20 08:14 Dose:  20 mg


Famotidine (Pepcid)  20 mg PO BID [facility]


   Last Admin: 03/29/20 08:45 Dose:  20 mg


Famotidine (Pepcid)  20 mg IVPUSH DAILY [facility]


Famotidine (Pepcid)  20 mg IVPUSH DAILY [facility]


   Last Admin: 04/03/20 08:49 Dose:  20 mg


Furosemide (Lasix)  20 mg IVPUSH NOW ONE


   Stop: 04/02/20 08:03


   Last Admin: 04/02/20 08:34 Dose:  10 mg


Furosemide (Lasix)  40 mg IVPUSH NOW ONE


   Stop: 04/03/20 10:16


   Last Admin: 04/03/20 10:38 Dose:  40 mg


Sodium Chloride (Normal Saline)  1,000 mls @ 999 mls/hr IV ASDIRECTED [facility]


   Last Admin: 03/26/20 12:18 Dose:  150 mls/hr


Ceftriaxone Sodium 1 gm/ (Sodium Chloride)  100 mls @ 200 mls/hr IV Q24H [facility]


   Last Admin: 03/27/20 14:58 Dose:  200 mls/hr


Sodium Chloride (Normal Saline)  1,000 mls @ 75 mls/hr IV ASDIRECTED [facility]


   Stop: 03/27/20 05:49


   Last Admin: 03/26/20 16:47 Dose:  75 mls/hr


Promethazine HCl 25 mg/ Sodium (Chloride)  51 mls @ 100 mls/hr IV ONETIME ONE


   Stop: 03/26/20 18:09


   Last Admin: 03/26/20 18:32 Dose:  100 mls/hr


Promethazine HCl 25 mg/ Sodium (Chloride)  51 mls @ 100 mls/hr IV Q6H PRN


   PRN Reason: Nausea/Vomiting


Sodium Chloride (Normal Saline)  1,000 mls @ 100 mls/hr IV ASDIRECTED [facility]


   Stop: 03/28/20 17:29


   Last Admin: 03/27/20 17:44 Dose:  100 mls/hr


Dextrose/Lactated Ringer's (Dextrose 5%-Lactated Ringers)  1,000 mls @ 150 mls/

hr IV ASDIRECTED [facility]


   Last Admin: 03/31/20 07:30 Dose:  150 mls/hr


Ceftriaxone Sodium 1 gm/ (Sodium Chloride)  100 mls @ 200 mls/hr IV Q24H [facility]


   Last Admin: 03/30/20 11:40 Dose:  200 mls/hr


Lactated Ringer's (Ringers, Lactated)  1,000 mls @ 150 mls/hr IV ASDIRECTED [facility]


   Last Admin: 03/31/20 17:32 Dose:  150 mls/hr


Dextrose/Lactated Ringer's (Dextrose 5%-Lactated Ringers)  1,000 mls @ 150 mls/

hr IV ASDIRECTED [facility]


   Last Admin: 04/01/20 20:52 Dose:  150 mls/hr


Dextrose/Lactated Ringer's (Dextrose 5%-Lactated Ringers)  1,000 mls @ 50 mls/

hr IV ASDIRECTED [facility]


   Last Admin: 04/04/20 02:56 Dose:  50 mls/hr


Sodium Chloride 38.4 meq/ (Dextrose/Water)  509.6 mls @ 80 mls/hr IV ASDIRECTED 

[facility]


Sodium Chloride 76.8 meq/Potassium Chloride 20 meq/Dextrose/Water  1,029.2 mls 

@ 100 mls/hr IV ASDIRECTED [facility]


Metoclopramide HCl (Reglan)  10 mg IVPUSH Q6H [facility]


   Stop: 04/01/20 21:16


   Last Admin: 04/01/20 12:12 Dose:  Not Given


Metoclopramide HCl (Reglan)  10 mg IVPUSH Q6H [facility]


   Stop: 04/01/20 23:01


   Last Admin: 04/01/20 23:15 Dose:  10 mg


Multivitamins (Thera)  1 each PO DAILY [facility]


   Last Admin: 03/29/20 08:46 Dose:  Not Given


Ondansetron HCl (Zofran)  4 mg IVPUSH ONETIME ONE


   Stop: 03/26/20 12:09


   Last Admin: 03/26/20 12:18 Dose:  4 mg


Ondansetron HCl (Zofran)  4 mg IVPUSH ONETIME ONE


   Stop: 03/26/20 14:31


   Last Admin: 03/26/20 14:38 Dose:  4 mg


Ondansetron HCl (Zofran Odt)  4 mg PO Q12H [facility]


   Last Admin: 03/29/20 00:47 Dose:  4 mg


Pharmacy Consult (Consult To Pharmacy)  1 each .XX ASDIRECTED [facility]


Senna/Docusate Sodium (Senna Plus)  1 tab PO BID PRN


   PRN Reason: Constipation


   Last Admin: 03/28/20 05:07 Dose:  1 tab








Comments:: 





pos  blood  culture   form  3/27/20





- Exam


General: Alert, Oriented


HEENT: Pupils Equal, Pupils Reactive, EOMI, Mucous Membr. Moist/Pink


Neck: Supple


Lungs: Clear to Auscultation, Normal Respiratory Effort


Cardiovascular: Regular Rate, Regular Rhythm


GI/Abdominal Exam: Normal Bowel Sounds, Soft, Non-Tender, No Organomegaly, No 

Distention, No Abnormal Bruit, No Mass, Pelvis Stable


 (Female) Exam: Normal External Exam, Normal Speculum Exam, Normal Bimanual 

Exam


Back Exam: Normal Inspection, Full Range of Motion


Extremities: Normal Inspection, Normal Range of Motion, Non-Tender, No Pedal 

Edema, Normal Capillary Refill


Skin: Warm, Dry, Intact


Wound/Incisions: Healing Well


Neurological: No New Focal Deficit


Psy/Mental Status: Alert, Normal Affect, Normal Mood





Sepsis Event Note





- Evaluation


Sepsis Screening Result: No Definite Risk





- Focused Exam


Vital Signs: 


 Vital Signs











  Temp Pulse Resp BP Pulse Ox


 


 04/05/20 12:16  36.4 C  84  16  135/71  96


 


 04/05/20 08:25  36.6 C  79  16  126/56 L  93 L


 


 04/05/20 03:32  36.8 C  73  20  111/63  94 L











Date Exam was Performed: 04/05/20


Time Exam was Performed: 14:21





- Problem List & Annotations


(1) Ovarian cancer


SNOMED Code(s): 931256849


   Code(s): C56.9 - MALIGNANT NEOPLASM OF UNSPECIFIED OVARY   Status: Acute   

Priority: High   Current Visit: Yes   Onset Date: 03/31/20   


Qualifiers: 


   Laterality: unspecified laterality   Qualified Code(s): C56.9 - Malignant 

neoplasm of unspecified ovary   





(2) S/P laparotomy


SNOMED Code(s): 057307710, 204198392, 672693700


   Code(s): Z98.890 - OTHER SPECIFIED POSTPROCEDURAL STATES   Status: Acute   

Priority: Medium   Current Visit: Yes   Onset Date: 03/31/20   Annotation/

Comment:: ileus  not  resolving  and will  start  octreotide  and cont  reglan  

today .


4/4/20


 doing   well   tolerating  meds  but  little  improvemtn other than n.g  

output  less.


4/5/20


obstruction  not  resolving   but will increase  reglan and try  rectal  tube. 

decrease  tramidol


   





(3) Small bowel obstruction


SNOMED Code(s): 825593664


   Code(s): K56.609 - UNSP INTESTNL OBST, UNSP AS TO PARTIAL VERSUS COMPLETE 

OBST   Status: Acute   Priority: High   Current Visit: Yes   Onset Date: 03/31/ 20   Annotation/Comment:: discussed  with  herself and family and minimal  abd  

pain and cont   cons.  treatment add  octreotide.n.g   and  iv  both  decreased 

/  i/os  neg  1000    





(4) Urinary tract infection


SNOMED Code(s): 21813522


   Code(s): N39.0 - URINARY TRACT INFECTION, SITE NOT SPECIFIED   Status: Acute

   Priority: Low   Current Visit: Yes   Onset Date: 03/31/20   


Qualifiers: 


   Urinary tract infection type: site unspecified   Hematuria presence: with 

hematuria   Qualified Code(s): N39.0 - Urinary tract infection, site not 

specified; R31.9 - Hematuria, unspecified   


Annotation/Comment:: klebsiella     treated   with  single  dose  rocephin  and

  not   continued .


  4/4/20 4/5/20


positive  campnocytophagia  aenerobic  organism   from  mouth or  g.i  tract. 

on  blood  culture  1/2.  will   reculture  and consider  emperic  antibiotic   

treatment    





- Problem List Review


Problem List Initiated/Reviewed/Updated: Yes





- My Orders


Last 24 Hours: 


My Active Orders





04/04/20 12:55


Communication Order [RC] ASDIRECTED 





04/04/20 19:31


Consult to Physician [CONS] Routine 





04/04/20 19:32


Notify Provider Consults [RC] ASDIRECTED 





04/04/20 19:39


POC Glucose [Blood Glucose Check, Bedside] [RC] QID 





04/04/20 23:30


Dextrose 10% in Water 1,000 ml   Sodium Chloride 23.4% 76.8 meq   Potassium 

Chloride 20 meq  mls/hr 





04/05/20 11:00


Insulin Glarg,Human.Rec.Analog [LantUS]   25 unit SUBCUT DAILY 





04/05/20 13:00


Rectal Tube Insertion [OM.PC] Routine 





04/05/20 13:34


CULTURE BLOOD [BC] Stat 


CULTURE BLOOD [BC] Stat 


Blood Culture x2 Reflex Set [OM.PC] Stat 














- Assessment


Assessment:: 





4/3/2020


She slept well through the night


She is NPO


She has a NG tube placed


She is walking with a walker as tolerated x1 assist





Discussed that she is allowed to have ice chips


Explained octreotide to her since she was refusing because she didn't 

understand it


Discussed getting labs of CMP, Mag, and Phos4/4/20


 


afebrile  


vss


  ng output  250  cc  last  12  hours.


  no abd  pain  unless  moving and  achey .passing  little  gas .  no  b.m 


p.e.  unchanged  and  no  tenderness and  clear  greenish  fluid  in   n.g. 

tube .


lab  mild  elavation  of  lfts with  stable  creatinine. cbc  unchanged.


kub   persistant  ileus  partially  resolved.


no  air  fluid   levels .


 minimal   tenderness  or  none. 





 assess:


1) ileus  continues   but  slow  improvement over  6  days  of  n.g  suction.  

will clamp  tube  every  2  hours  and see if  she   tolerates.


 on  octratide and  reglan and  tolerating well.


2)ovarian  cancer  widely  metastatic and  patient  desires  to  go  home .  

home  suction  devices  being  sought   by  family .


 3) npo x  6  days and will need to  consider  tpn for   chronic  nutrition  

needs and   she is  discussing  that with family .  would  need a  gil cath  

placed and she is not  sure  she  wants to  do   that  or  tpn.  supportive  

care and  hospice  brought up as  options  as well .]\chemo therapy  options   

discussed over  phone  with  oncology and they  do not  think  she   can  

tolerate  but  would  see her  back  to  discuss in    further  detail .  no  

plans  for  chemo  emergantly  as  she  has   incision  to  heal up .


functionally  doing  well  and  incision  looks   good / 


 abd  pain  minmal and walking and control of  bowels and bladder  good. 


no signs  infection  or  cardiac  issues   but she is  obese. 


  





                                                                               

           boh 





- Plan


Plan:: 





4/3/2020


She slept well through the night


She is NPO


She has a NG tube placed


She is walking with a walker as tolerated x1 assist





Discussed that she is allowed to have ice chips


Explained octreotide to her since she was refusing because she didn't 

understand it


Discussed getting labs of CMP, Mag, and Phos4/4/20


 


afebrile  


vss


  ng output  250  cc  last  12  hours.


  no abd  pain  unless  moving and  achey .passing  little  gas .  no  b.m 


p.e.  unchanged  and  no  tenderness and  clear  greenish  fluid  in   n.g. 

tube .


lab  mild  elavation  of  lfts with  stable  creatinine. cbc  unchanged.


kub   persistant  ileus  partially  resolved.


no  air  fluid   levels .


 minimal   tenderness  or  none. 





 assess:


1) ileus  continues   but  slow  improvement over  6  days  of  n.g  suction.  

will clamp  tube  every  2  hours  and see if  she   tolerates.


 on  octratide and  reglan and  tolerating well.


2)ovarian  cancer  widely  metastatic and  patient  desires  to  go  home .  

home  suction  devices  being  sought   by  family .


 3) npo x  6  days and will need to  consider  tpn for   chronic  nutrition  

needs and   she is  discussing  that with family .  would  need a  gil cath  

placed and she is not  sure  she  wants to  do   that  or  tpn.  supportive  

care and  hospice  brought up as  options  as well .]\chemo therapy  options   

discussed over  phone  with  oncology and they  do not  think  she   can  

tolerate  but  would  see her  back  to  discuss in    further  detail .  no  

plans  for  chemo  emergantly  as  she  has   incision  to  heal up .


functionally  doing  well  and  incision  looks   good / 


 abd  pain  minmal and walking and control of  bowels and bladder  good. 


no signs  infection  or  cardiac  issues   but she is  obese. 


  





                                                                               

           boh

## 2020-04-05 NOTE — PCM.SN
- Free Text/Narrative


Note: 





p.m  note


called   because of  decreased  arousal and  increased confusion .  has  been  

started  on  phenergan  i.v  one  hour  ago  sec  to continued  nausea and 

vomiting.


  patient  alerta nd  confused and  minimal  rt  eye  droop without  fascial  

droop.  speech  clear  yes  /no  but is  picking  at  lines and  wants  to  

move around.  lung  sounds  mildly  congested  and occasional  wheeze.  no  

jvd. 


cor rate  135 .  abd  silent  b.s .  minimal  tenderness .  ng  brackish  

output  140 cc in  past  2  hours.  3  emisis since this  noon  time. 


 i.v  d10 1/4 n.s. at  100  cc  hour .


pupils  equal and  reactive. pulses  good. edema  2plus.  no  pronator  drift 

and no weakness . 


  assess :     


1) adverse  reaction  to  high  dose  phenergan or  interaction   with  reglan 

. 


 2)somatomedin not  able  to  reverse  ileus . 


3) continued  retching a nd  nausea .    increasing  edema .


4)   


plan:   lab


  ativan  and  reassess.   hold   meds  for  ileus.


    boh

## 2020-04-05 NOTE — CR
Abdomen: Supine portable view of the abdomen was obtained.

 

Comparison: Prior abdominal x-ray of 04/02/20.

 

Several loops of slightly prominent small bowel gas noted within the 

upper abdomen.  Findings are minimally improved from previous exam.  

Previous nasogastric tube is again seen.  Scattered degenerative 

change is noted within the spine.

 

Impression:

1.  Slightly prominent gas-filled small bowel loops of the upper 

abdomen which is minimally improved from previous exam.

2.  Stable nasogastric tube.

3.  Other findings believed to be incidental.

 

Diagnostic code #3

 

This report was dictated in MDT

## 2020-04-06 NOTE — CR
Abdomen: Supine view of the abdomen was obtained.

 

Comparison: Prior abdominal x-ray of 04/05/20.

 

Slightly dilated air-filled loop of small bowel within the upper 

abdomen.  Findings continue to show minimal improvement.  Tip of 

nasogastric tube lies within the stomach.  Inferior vena cava filter 

is noted.  Slight degenerative change scattered within the spine.

 

Impression:

1.  Slightly dilated loops of small bowel within the upper abdomen.  

Findings continue to minimally improved.

2.  Stable position of nasogastric tube.

 

Diagnostic code #3

 

This report was dictated in MDT

## 2020-04-06 NOTE — PCM.PN
- General Info


Date of Service: 04/06/20


Admission Dx/Problem (Free Text): 


 Admission Diagnosis/Problem





Admission Diagnosis/Problem      Urinary tract infection





79 year old female admitted on 3/26/2020 with complaints of abdominal pain 

after she was hospitalized and had surgery to remove her uterus and colon 

lesions at [facility] on 3/18/2020


Functional Status: Reports: Pain Controlled, Ambulating, Urinating.  Denies: 

Tolerating Diet (NPO with pleasure feeds ), New Symptoms





- Review of Systems


General: Reports: No Symptoms, Weakness, Fatigue.  Denies: Fever, Malaise, 

Chills


HEENT: Reports: Sore Throat (2/2 NG tube ).  Denies: Headaches


Pulmonary: Reports: No Symptoms.  Denies: Shortness of Breath, Pleuritic Chest 

Pain, Cough, Sputum, Wheezing


Cardiovascular: Reports: No Symptoms


Gastrointestinal: Reports: Constipation, Decreased Appetite.  Denies: Abdominal 

Pain, Diarrhea, Flatus, Nausea, Vomiting


Genitourinary: Reports: No Symptoms.  Denies: Pain


Musculoskeletal: Reports: No Symptoms


Skin: Reports: No Symptoms.  Denies: Cyanosis


Neurological: Reports: No Symptoms.  Denies: Confusion, Difficulty Walking, 

Gait Disturbance


Psychiatric: Reports: No Symptoms





- Patient Data


Vitals - Most Recent: 


 Last Vital Signs











Temp  98.1 F   04/06/20 07:31


 


Pulse  98   04/06/20 07:38


 


Resp  20   04/06/20 07:31


 


BP  121/51 L  04/06/20 07:31


 


Pulse Ox  97   04/06/20 07:38











Weight - Most Recent: 248 lb


I&O - Last 24 Hours: 


 Intake & Output











 04/05/20 04/06/20 04/06/20





 22:59 06:59 14:59


 


Intake Total 1220 1411 


 


Output Total 1050 1800 


 


Balance 170 -389 











Lab Results Last 24 Hours: 


 Laboratory Results - last 24 hr











  04/05/20 04/05/20 04/05/20 Range/Units





  12:03 17:13 17:51 


 


WBC     (3.98-10.04)  K/mm3


 


RBC     (3.98-5.22)  M/mm3


 


Hgb     (11.2-15.7)  gm/dl


 


Hct     (34.1-44.9)  %


 


MCV     (79.4-94.8)  fl


 


MCH     (25.6-32.2)  pg


 


MCHC     (32.2-35.5)  g/dl


 


RDW Std Deviation     (36.4-46.3)  fL


 


Plt Count     (182-369)  K/mm3


 


MPV     (9.4-12.3)  fl


 


Neut % (Auto)     (34.0-71.1)  %


 


Lymph % (Auto)     (19.3-51.7)  %


 


Mono % (Auto)     (4.7-12.5)  %


 


Eos % (Auto)     (0.7-5.8)  


 


Baso % (Auto)     (0.1-1.2)  %


 


Neut # (Auto)     (1.56-6.13)  K/mm3


 


Lymph # (Auto)     (1.18-3.74)  K/mm3


 


Mono # (Auto)     (0.24-0.36)  K/mm3


 


Eos # (Auto)     (0.04-0.36)  K/mm3


 


Baso # (Auto)     (0.01-0.08)  K/mm3


 


Manual Slide Review     


 


Sodium     (136-145)  mEq/L


 


Potassium     (3.5-5.1)  mEq/L


 


Chloride     ()  mEq/L


 


Carbon Dioxide     (21-32)  mEq/L


 


Anion Gap     (5-15)  


 


BUN     (7-18)  mg/dL


 


Creatinine     (0.55-1.02)  mg/dL


 


Est Cr Clr Drug Dosing     mL/min


 


Estimated GFR (MDRD)     (>60)  mL/min


 


BUN/Creatinine Ratio     (14-18)  


 


Glucose     ()  mg/dL


 


POC Glucose  172 H  158 H  165 H  ()  mg/dL


 


Calcium     (8.5-10.1)  mg/dL


 


Total Bilirubin     (0.2-1.0)  mg/dL


 


AST     (15-37)  U/L


 


ALT     (14-59)  U/L


 


Alkaline Phosphatase     ()  U/L


 


Total Protein     (6.4-8.2)  g/dl


 


Albumin     (3.4-5.0)  g/dl


 


Globulin     gm/dL


 


Albumin/Globulin Ratio     (1-2)  


 


Amylase     ()  U/L














  04/05/20 04/05/20 04/06/20 Range/Units





  18:16 18:16 00:37 


 


WBC  13.08 H    (3.98-10.04)  K/mm3


 


RBC  4.26    (3.98-5.22)  M/mm3


 


Hgb  12.7    (11.2-15.7)  gm/dl


 


Hct  41.6    (34.1-44.9)  %


 


MCV  97.7 H    (79.4-94.8)  fl


 


MCH  29.8    (25.6-32.2)  pg


 


MCHC  30.5 L    (32.2-35.5)  g/dl


 


RDW Std Deviation  47.2 H    (36.4-46.3)  fL


 


Plt Count  397 H    (182-369)  K/mm3


 


MPV  10.0    (9.4-12.3)  fl


 


Neut % (Auto)  74.3 H    (34.0-71.1)  %


 


Lymph % (Auto)  10.0 L    (19.3-51.7)  %


 


Mono % (Auto)  15.1 H    (4.7-12.5)  %


 


Eos % (Auto)  0.2 L    (0.7-5.8)  


 


Baso % (Auto)  0.2    (0.1-1.2)  %


 


Neut # (Auto)  9.73 H    (1.56-6.13)  K/mm3


 


Lymph # (Auto)  1.31    (1.18-3.74)  K/mm3


 


Mono # (Auto)  1.98 H    (0.24-0.36)  K/mm3


 


Eos # (Auto)  0.02 L    (0.04-0.36)  K/mm3


 


Baso # (Auto)  0.02    (0.01-0.08)  K/mm3


 


Manual Slide Review  Normal smear    


 


Sodium   145   (136-145)  mEq/L


 


Potassium   4.2   (3.5-5.1)  mEq/L


 


Chloride   108 H   ()  mEq/L


 


Carbon Dioxide   27   (21-32)  mEq/L


 


Anion Gap   14.2   (5-15)  


 


BUN   14   (7-18)  mg/dL


 


Creatinine   1.2 H   (0.55-1.02)  mg/dL


 


Est Cr Clr Drug Dosing   33.65   mL/min


 


Estimated GFR (MDRD)   43   (>60)  mL/min


 


BUN/Creatinine Ratio   11.7 L   (14-18)  


 


Glucose   171 H   ()  mg/dL


 


POC Glucose    144 H  ()  mg/dL


 


Calcium   8.5   (8.5-10.1)  mg/dL


 


Total Bilirubin   0.3   (0.2-1.0)  mg/dL


 


AST   61 H   (15-37)  U/L


 


ALT   67 H   (14-59)  U/L


 


Alkaline Phosphatase   112   ()  U/L


 


Total Protein   6.3 L   (6.4-8.2)  g/dl


 


Albumin   2.5 L   (3.4-5.0)  g/dl


 


Globulin   3.8   gm/dL


 


Albumin/Globulin Ratio   0.7 L   (1-2)  


 


Amylase   33   ()  U/L














  04/06/20 Range/Units





  06:12 


 


WBC   (3.98-10.04)  K/mm3


 


RBC   (3.98-5.22)  M/mm3


 


Hgb   (11.2-15.7)  gm/dl


 


Hct   (34.1-44.9)  %


 


MCV   (79.4-94.8)  fl


 


MCH   (25.6-32.2)  pg


 


MCHC   (32.2-35.5)  g/dl


 


RDW Std Deviation   (36.4-46.3)  fL


 


Plt Count   (182-369)  K/mm3


 


MPV   (9.4-12.3)  fl


 


Neut % (Auto)   (34.0-71.1)  %


 


Lymph % (Auto)   (19.3-51.7)  %


 


Mono % (Auto)   (4.7-12.5)  %


 


Eos % (Auto)   (0.7-5.8)  


 


Baso % (Auto)   (0.1-1.2)  %


 


Neut # (Auto)   (1.56-6.13)  K/mm3


 


Lymph # (Auto)   (1.18-3.74)  K/mm3


 


Mono # (Auto)   (0.24-0.36)  K/mm3


 


Eos # (Auto)   (0.04-0.36)  K/mm3


 


Baso # (Auto)   (0.01-0.08)  K/mm3


 


Manual Slide Review   


 


Sodium   (136-145)  mEq/L


 


Potassium   (3.5-5.1)  mEq/L


 


Chloride   ()  mEq/L


 


Carbon Dioxide   (21-32)  mEq/L


 


Anion Gap   (5-15)  


 


BUN   (7-18)  mg/dL


 


Creatinine   (0.55-1.02)  mg/dL


 


Est Cr Clr Drug Dosing   mL/min


 


Estimated GFR (MDRD)   (>60)  mL/min


 


BUN/Creatinine Ratio   (14-18)  


 


Glucose   ()  mg/dL


 


POC Glucose  156 H  ()  mg/dL


 


Calcium   (8.5-10.1)  mg/dL


 


Total Bilirubin   (0.2-1.0)  mg/dL


 


AST   (15-37)  U/L


 


ALT   (14-59)  U/L


 


Alkaline Phosphatase   ()  U/L


 


Total Protein   (6.4-8.2)  g/dl


 


Albumin   (3.4-5.0)  g/dl


 


Globulin   gm/dL


 


Albumin/Globulin Ratio   (1-2)  


 


Amylase   ()  U/L











Med Orders - Current: 


 Current Medications





Dextrose/Water (Dextrose 50% In Water)  50 ml IVPUSH ASDIRECTED PRN


   PRN Reason: Hypoglycemia


Dextrose/Sodium Chloride (Dextrose 5%-1/2 Ns)  1,000 mls @ 150 mls/hr IV 

ASDIRECTED [facility]


   Last Admin: 04/06/20 02:18 Dose:  150 mls/hr


Promethazine HCl 12.5 mg/ (Sodium Chloride)  50.5 mls @ 100 mls/hr IV Q4H PRN


   PRN Reason: Nausea


Miscellaneous Information (Remove Patch)  1 ea TRDERM Q72H [facility]


   Last Admin: 04/04/20 23:44 Dose:  1 ea


Morphine Sulfate (Morphine)  2 mg IVPUSH Q4H PRN


   PRN Reason: Pain


   Last Admin: 04/06/20 04:17 Dose:  2 mg


Scopolamine (Transderm-Scop)  1.5 mg TRDERM Q72H [facility]


   Last Admin: 04/04/20 20:27 Dose:  1.5 mg


Sodium Chloride (Saline Flush)  10 ml FLUSH ASDIRECTED PRN


   PRN Reason: Keep Vein Open


   Last Admin: 03/26/20 12:18 Dose:  10 ml





Discontinued Medications





Acetaminophen (Tylenol)  650 mg PO Q4H PRN


   PRN Reason: Pain (Mild 1-3)/fever


   Last Admin: 03/28/20 05:07 Dose:  650 mg


Al Hydroxide/Mg Hydroxide (Mag-Al Plus)  30 ml PO ONETIME ONE


   Stop: 03/26/20 13:42


   Last Admin: 03/26/20 13:47 Dose:  30 ml


Apixaban (Eliquis)  5 mg PO BID [facility]


   Last Admin: 03/29/20 08:45 Dose:  5 mg


Benzocaine (Hurricaine 20% Spray)  0 ml MUCMEM Q2H PRN


   PRN Reason: Sore Throat


   Last Admin: 03/31/20 15:19 Dose:  1 spray


Benzocaine/Menthol (Cepacol Sore Throat)  1 lozenge MUCMEM Q2HR PRN


   PRN Reason: Sore Throat


Bisacodyl (Dulcolax)  10 mg RECTAL ONETIME ONE


   Stop: 04/01/20 11:55


   Last Admin: 04/01/20 16:35 Dose:  Not Given


Bisacodyl (Dulcolax)  10 mg RECTAL ONETIME ONE


   Stop: 04/02/20 08:18


   Last Admin: 04/02/20 09:58 Dose:  10 mg


Calcium Polycarbophil (Fibercon)  1,250 mg PO DAILY [facility]


   Last Admin: 03/30/20 11:01 Dose:  Not Given


Cefdinir (Omnicef)  300 mg PO BID [facility]


   Last Admin: 03/29/20 08:45 Dose:  300 mg


Dexamethasone (Dexamethasone)  2 mg IVPUSH ONETIME ONE


   Stop: 04/05/20 21:10


   Last Admin: 04/06/20 07:45 Dose:  Not Given


Enoxaparin Sodium (Lovenox)  110 mg SUBCUT Q12H [facility]


   Last Admin: 04/05/20 20:47 Dose:  110 mg


Famotidine (Pepcid)  20 mg IVPUSH ONETIME ONE


   Stop: 03/26/20 14:33


   Last Admin: 03/26/20 14:38 Dose:  20 mg


Famotidine (Pepcid)  20 mg PO DAILY [facility]


   Last Admin: 03/28/20 08:14 Dose:  20 mg


Famotidine (Pepcid)  20 mg PO BID [facility]


   Last Admin: 03/29/20 08:45 Dose:  20 mg


Famotidine (Pepcid)  20 mg IVPUSH DAILY [facility]


Famotidine (Pepcid)  20 mg IVPUSH DAILY [facility]


   Last Admin: 04/03/20 08:49 Dose:  20 mg


Furosemide (Lasix)  20 mg IVPUSH NOW ONE


   Stop: 04/02/20 08:03


   Last Admin: 04/02/20 08:34 Dose:  10 mg


Furosemide (Lasix)  40 mg IVPUSH NOW ONE


   Stop: 04/03/20 10:16


   Last Admin: 04/03/20 10:38 Dose:  40 mg


Sodium Chloride (Normal Saline)  1,000 mls @ 999 mls/hr IV ASDIRECTED [facility]


   Last Admin: 03/26/20 12:18 Dose:  150 mls/hr


Ceftriaxone Sodium 1 gm/ (Sodium Chloride)  100 mls @ 200 mls/hr IV Q24H [facility]


   Last Admin: 03/27/20 14:58 Dose:  200 mls/hr


Sodium Chloride (Normal Saline)  1,000 mls @ 75 mls/hr IV ASDIRECTED [facility]


   Stop: 03/27/20 05:49


   Last Admin: 03/26/20 16:47 Dose:  75 mls/hr


Promethazine HCl 25 mg/ Sodium (Chloride)  51 mls @ 100 mls/hr IV ONETIME ONE


   Stop: 03/26/20 18:09


   Last Admin: 03/26/20 18:32 Dose:  100 mls/hr


Promethazine HCl 25 mg/ Sodium (Chloride)  51 mls @ 100 mls/hr IV Q6H PRN


   PRN Reason: Nausea/Vomiting


Sodium Chloride (Normal Saline)  1[facility]000 [facility]ls [facility]


   Stop: 03/28/20 17:29


   Last Admin: 03/27/20 17:44 Dose:  100 mls/hr


Dextrose/Lactated Ringer's (Dextrose 5%-Lactated Ringers)  1,000 mls @ 150 mls/

hr IV ASDIRECTED [facility]


   Last Admin: 03/31/20 07:30 Dose:  150 mls/hr


Ceftriaxone Sodium 1 gm/ (Sodium Chloride)  100 mls @ 200 mls/hr IV Q24H [facility]


   Last Admin: 03/30/20 11:40 Dose:  200 mls/hr


Lactated Ringer's (Ringers, Lactated)  1,000 mls @ 150 mls/hr IV ASDIRECTED [facility]


   Last Admin: 03/31/20 17:32 Dose:  150 mls/hr


Dextrose/Lactated Ringer's (Dextrose 5%-Lactated Ringers)  1,000 mls @ 150 mls/

hr IV ASDIRECTED [facility]


   Last Admin: 04/01/20 20:52 Dose:  150 mls/hr


Dextrose/Lactated Ringer's (Dextrose 5%-Lactated Ringers)  1,000 mls @ 50 mls/

hr IV ASDIRECTED [facility]


   Last Admin: 04/04/20 02:56 Dose:  50 mls/hr


Sodium Chloride 38.4 meq/ (Dextrose/Water)  509.6 mls @ 80 mls/hr IV ASDIRECTED 

[facility]


Sodium Chloride 76.8 meq/Potassium Chloride 20 meq/Dextrose/Water  1,029.2 mls 

@ 100 mls/hr IV ASDIRECTED [facility]


Sodium Chloride 76.8 meq/Potassium Chloride 20 meq/Dextrose/Water  1,029.2 mls 

@ 100 mls/hr IV .[id number] [facility]


   Last Admin: 04/05/20 10:27 Dose:  100 mls/hr


Ceftriaxone Sodium 2 gm/ (Sodium Chloride)  100 mls @ 200 mls/hr IV Q24H [facility]


   Last Admin: 04/05/20 15:04 Dose:  Not Given


Ceftriaxone Sodium 2 gm/ (Sodium Chloride)  100 mls @ 200 mls/hr IV Q24H [facility]


   Last Admin: 04/05/20 14:53 Dose:  200 mls/hr


Promethazine HCl 50 mg/ Sodium (Chloride)  52 mls @ 100 mls/hr IV Q6H [facility]


   Stop: 04/06/20 16:45


   Last Admin: 04/05/20 17:04 Dose:  100 mls/hr


Insulin Glargine (Lantus)  25 unit SUBCUT DAILY [facility]


   Last Admin: 04/05/20 12:00 Dose:  25 units


Lorazepam (Ativan)  0.5 mg IVPUSH ONETIME ONE


   Stop: 04/05/20 18:04


   Last Admin: 04/05/20 18:00 Dose:  Not Given


Lorazepam (Ativan)  0.5 mg IVPUSH ONETIME ONE


   Stop: 04/05/20 20:01


   Last Admin: 04/05/20 20:50 Dose:  0.5 mg


Lorazepam (Ativan)  0.5 mg IVPUSH Q4H PRN


   PRN Reason: restlessness


Magnesium Hydroxide (Milk Of Magnesia)  30 ml PO BID PRN


   PRN Reason: Constipation


   Last Admin: 03/29/20 05:17 Dose:  30 ml


Melatonin (Melatonin)  6 mg PO BEDTIME PRN


   PRN Reason: Sleep


   Last Admin: 04/04/20 20:38 Dose:  6 mg


Metoclopramide HCl (Reglan)  10 mg IVPUSH Q6H [facility]


   Stop: 04/01/20 21:16


   Last Admin: 04/01/20 12:12 Dose:  Not Given


Metoclopramide HCl (Reglan)  10 mg IVPUSH Q6H [facility]


   Stop: 04/01/20 23:01


   Last Admin: 04/01/20 23:15 Dose:  10 mg


Metoclopramide HCl (Reglan)  10 mg PO Q8H [facility]


   Last Admin: 04/05/20 14:52 Dose:  10 mg


Multivitamins (Thera)  1 each PO DAILY [facility]


   Last Admin: 03/29/20 08:46 Dose:  Not Given


Octreotide Acetate (Octreotide)  100 mcg SUBCUT TID [facility]


   Last Admin: 04/05/20 08:24 Dose:  100 mcg


Octreotide Acetate (Octreotide)  100 mcg SUBCUT QID [facility]


   Last Admin: 04/05/20 20:49 Dose:  100 mcg


Ondansetron HCl (Zofran)  4 mg IVPUSH ONETIME ONE


   Stop: 03/26/20 12:09


   Last Admin: 03/26/20 12:18 Dose:  4 mg


Ondansetron HCl (Zofran)  4 mg IVPUSH ONETIME ONE


   Stop: 03/26/20 14:31


   Last Admin: 03/26/20 14:38 Dose:  4 mg


Ondansetron HCl (Zofran)  4 mg IV Q6H PRN


   PRN Reason: Nausea/Vomiting


   Last Admin: 04/05/20 12:10 Dose:  4 mg


Ondansetron HCl (Zofran Odt)  4 mg PO Q12H [facility]


   Last Admin: 03/29/20 00:47 Dose:  4 mg


Ondansetron HCl (Zofran)  4 mg IVPUSH ONETIME ONE


   Stop: 04/05/20 15:00


   Last Admin: 04/05/20 15:13 Dose:  4 mg


Pharmacy Consult (Consult To Pharmacy)  1 each .XX ASDIRECTED [facility]


Senna/Docusate Sodium (Senna Plus)  1 tab PO BID PRN


   PRN Reason: Constipation


   Last Admin: 03/28/20 05:07 Dose:  1 tab


Senna/Docusate Sodium (Senna Plus)  1 tab PO BID [facility]


   Last Admin: 04/06/20 07:44 Dose:  Not Given


Simethicone (Simethicone)  80 mg PO QID PRN


   PRN Reason: bloating 


   Last Admin: 03/31/20 20:54 Dose:  80 mg


Tramadol HCl (Ultram)  50 mg PO Q6H PRN


   PRN Reason: Pain


   Last Admin: 04/04/20 20:38 Dose:  50 mg











- Exam


Quality Assessment: Urine Catheter.  No: DVT Prophylaxis


General: Alert, Oriented, Cooperative, No Acute Distress


HEENT: Pupils Equal, Pupils Reactive, Mucous Membr. Moist/Pink


Neck: Supple, Trachea Midline


Lungs: Clear to Auscultation, Normal Respiratory Effort, Decreased Breath Sounds


Cardiovascular: Regular Rate, Regular Rhythm


GI/Abdominal Exam: Soft, Non-Tender, No Distention, Abnormal Bowel Sounds (

Absent )


 (Female) Exam: Deferred


Extremities: Normal Inspection, Normal Range of Motion, Non-Tender, No Pedal 

Edema, Normal Capillary Refill


Skin: Warm, Dry, Intact


Wound/Incisions: Healing Well.  No: Erythema


Neurological: No New Focal Deficit


Psy/Mental Status: Alert





Sepsis Event Note





- Evaluation


Sepsis Screening Result: No Definite Risk





- Focused Exam


Vital Signs: 


 Vital Signs











  Temp Pulse Pulse Resp BP Pulse Ox


 


 04/06/20 07:38    98    97


 


 04/06/20 07:31  98.1 F    20  121/51 L 


 


 04/06/20 03:16  99.0 F  104 H   22 H  126/80  100











Date Exam was Performed: 04/06/20


Time Exam was Performed: 18:08





- Problem List & Annotations


(1) Nausea and vomiting


SNOMED Code(s): 73732370


   Code(s): R11.2 - NAUSEA WITH VOMITING, UNSPECIFIED   Status: Resolved   

Priority: High   Current Visit: Yes   


Qualifiers: 


   Vomiting type: unspecified   Vomiting Intractability: intractable   

Qualified Code(s): R11.2 - Nausea with vomiting, unspecified   





(2) History of ovarian cancer


Status: Chronic   Priority: Medium   Current Visit: No   





(3) Colon cancer


SNOMED Code(s): 616924056


   Code(s): C18.9 - MALIGNANT NEOPLASM OF COLON, UNSPECIFIED   Status: Acute   

Priority: High   Current Visit: Yes   


Qualifiers: 


   Colon location: unspecified part of colon   Qualified Code(s): C18.9 - 

Malignant neoplasm of colon, unspecified   





(4) Leukocytosis


SNOMED Code(s): 264238661, 724296718


   Code(s): D72.829 - ELEVATED WHITE BLOOD CELL COUNT, UNSPECIFIED   Status: 

Acute   Priority: High   Current Visit: Yes   


Qualifiers: 


   Leukocytosis type: unspecified   Qualified Code(s): D72.829 - Elevated white 

blood cell count, unspecified   





(5) Acute renal injury


SNOMED Code(s): 20947534, 83381035


   Code(s): N17.9 - ACUTE KIDNEY FAILURE, UNSPECIFIED   Status: Acute   Priority

: High   Current Visit: Yes   





(6) High anion gap metabolic acidosis


SNOMED Code(s): 53100202


   Code(s): E87.2 - ACIDOSIS   Status: Acute   Priority: High   Current Visit: 

Yes   





(7) History of venous thromboembolism


SNOMED Code(s): 815068138


   Code(s): Z86.718 - PERSONAL HISTORY OF OTHER VENOUS THROMBOSIS AND EMBOLISM 

  Status: Chronic   Priority: Medium   Current Visit: Yes   





(8) Chronic anticoagulation


SNOMED Code(s): 053053612


   Code(s): Z79.01 - LONG TERM (CURRENT) USE OF ANTICOAGULANTS   Status: 

Chronic   Priority: Low   Current Visit: Yes   





(9) HLD (hyperlipidemia)


SNOMED Code(s): 72151697


   Code(s): E78.5 - HYPERLIPIDEMIA, UNSPECIFIED   Status: Chronic   Priority: 

Low   Current Visit: No   


Qualifiers: 


   Hyperlipidemia type: unspecified   Qualified Code(s): E78.5 - Hyperlipidemia

, unspecified   





(10) GERD (gastroesophageal reflux disease)


SNOMED Code(s): 290561735


   Code(s): K21.9 - GASTRO-ESOPHAGEAL REFLUX DISEASE WITHOUT ESOPHAGITIS   

Status: Chronic   Priority: Medium   Current Visit: No   


Qualifiers: 


   Esophagitis presence: esophagitis presence not specified   Qualified Code(s)

: K21.9 - Gastro-esophageal reflux disease without esophagitis   





(11) Obesity


SNOMED Code(s): 981041335, 385470875


   Code(s): E66.9 - OBESITY, UNSPECIFIED   Status: Chronic   Priority: Medium   

Current Visit: No   


Qualifiers: 


   Obesity type: unspecified obesity type   Obesity classification: adult class 

2 (BMI 35 - 39.9)   Body mass index: BMI 38.0-38.9 





(12) Hematochezia


SNOMED Code(s): 218445757


   Code(s): K92.1 - MELENA   Status: Chronic   Priority: Medium   Current Visit

: Yes   





(13) Urinary tract infection


SNOMED Code(s): 39264647


   Code(s): N39.0 - URINARY TRACT INFECTION, SITE NOT SPECIFIED   Status: Acute

   Priority: Low   Current Visit: Yes   Onset Date: 03/31/20   


Qualifiers: 


   Urinary tract infection type: site unspecified   Hematuria presence: with 

hematuria   Qualified Code(s): N39.0 - Urinary tract infection, site not 

specified; R31.9 - Hematuria, unspecified   


Annotation/Comment:: klebsiella     treated   with  single  dose  rocephin  and

  not   continued .


  4/4/20 4/5/20


positive  campnocytophagia  aenerobic  organism   from  mouth or  g.i  tract. 

on  blood  culture  1/2.  will   reculture  and consider  emperic  antibiotic   

treatment    





(14) S/P laparotomy


SNOMED Code(s): 155402984, 157683890, 233716920


   Code(s): Z98.890 - OTHER SPECIFIED POSTPROCEDURAL STATES   Status: Acute   

Priority: Medium   Current Visit: Yes   Onset Date: 03/31/20   Annotation/

Comment:: ileus  not  resolving  and will  start  octreotide  and cont  reglan  

today .


4/4/20


 doing   well   tolerating  meds  but  little  improvemtn other than n.g  

output  less.


4/5/20


obstruction  not  resolving   but will increase  reglan and try  rectal  tube. 

decrease  tramidol


   





(15) Volume depletion


SNOMED Code(s): 617891343


   Code(s): E86.9 - VOLUME DEPLETION, UNSPECIFIED   Status: Acute   Current 

Visit: Yes   





(16) Chronic constipation


SNOMED Code(s): 024163450


   Code(s): K59.09 - OTHER CONSTIPATION   Status: Chronic   Priority: High   

Current Visit: Yes   





(17) Small bowel obstruction


SNOMED Code(s): 082167271


   Code(s): K56.609 - UNSP INTESTNL OBST, UNSP AS TO PARTIAL VERSUS COMPLETE 

OBST   Status: Acute   Priority: High   Current Visit: Yes   Onset Date: 03/31/ 20   Annotation/Comment:: discussed  with  herself and family and minimal  abd  

pain and cont   cons.  treatment add  octreotide.n.g   and  iv  both  decreased 

/  i/os  neg  1000    





(18) Ovarian cancer


SNOMED Code(s): 096766897


   Code(s): C56.9 - MALIGNANT NEOPLASM OF UNSPECIFIED OVARY   Status: Acute   

Priority: High   Current Visit: Yes   Onset Date: 03/31/20   


Qualifiers: 


   Laterality: unspecified laterality   Qualified Code(s): C56.9 - Malignant 

neoplasm of unspecified ovary   





- Problem List Review


Problem List Initiated/Reviewed/Updated: Yes





- Plan


Plan:: 


4/3/2020


She slept well through the night


She is NPO


She has a NG tube placed


She is walking with a walker as tolerated x1 assist





Discussed that she is allowed to have ice chips


Explained octreotide to her since she was refusing because she didn't 

understand it


Discussed getting labs of CMP, Mag, and Phos4/4/20


 


afebrile  


vss


  ng output  250  cc  last  12  hours.


  no abd  pain  unless  moving and  achey .passing  little  gas .  no  b.m 


p.e.  unchanged  and  no  tenderness and  clear  greenish  fluid  in   n.g. 

tube .


lab  mild  elavation  of  lfts with  stable  creatinine. cbc  unchanged.


kub   persistant  ileus  partially  resolved.


no  air  fluid   levels .


 minimal   tenderness  or  none. 





 assess:


1) ileus  continues   but  slow  improvement over  6  days  of  n.g  suction.  

will clamp  tube  every  2  hours  and see if  she   tolerates.


 on  octratide and  reglan and  tolerating well.


2)ovarian  cancer  widely  metastatic and  patient  desires  to  go  home .  

home  suction  devices  being  sought   by  family .


 3) npo x  6  days and will need to  consider  tpn for   chronic  nutrition  

needs and   she is  discussing  that with family .  would  need a  gil cath  

placed and she is not  sure  she  wants to  do   that  or  tpn.  supportive  

care and  hospice  brought up as  options  as well .]\chemo therapy  options   

discussed over  phone  with  oncology and they  do not  think  she   can  

tolerate  but  would  see her  back  to  discuss in    further  detail .  no  

plans  for  chemo  emergantly  as  she  has   incision  to  heal up .


functionally  doing  well  and  incision  looks   good / 


 abd  pain  minmal and walking and control of  bowels and bladder  good. 


no signs  infection  or  cardiac  issues   but she is  obese. 


  


4/4/20


 


afebrile  


vss


  ng output  250  cc  last  12  hours.


  no abd  pain  unless  moving and  achey .passing  little  gas .  no  b.m 


p.e.  unchanged  and  no  tenderness and  clear  greenish  fluid  in   n.g. 

tube .


lab  mild  elavation  of  lfts with  stable  creatinine. cbc  unchanged.


kub   persistant  ileus  partially  resolved.


no  air  fluid   levels .


 minimal   tenderness  or  none. 





 assess:


1) ileus  continues   but  slow  improvement over  6  days  of  n.g  suction.  

will clamp  tube  every  2  hours  and see if  she   tolerates.


 on  octreotide and  reglan and  tolerating well.


2)ovarian  cancer  widely  metastatic and  patient  desires  to  go  home .  

home  suction  devices  being  sought   by  family .


 3) npo x  6  days and will need to  consider  tpn for   chronic  nutrition  

needs and   she is  discussing  that with family .  would  need a  gil cath  

placed and she is not  sure  she  wants to  do   that  or  tpn.  supportive  

care and  hospice  brought up as  options  as well .]\chemo therapy  options   

discussed over  phone  with  oncology and they  do not  think  she   can  

tolerate  but  would  see her  back  to  discuss in    further  detail .  no  

plans  for  chemo  emergantly  as  she  has   incision  to  heal up .


functionally  doing  well  and  incision  looks   good / 


 abd  pain  minmal and walking and control of  bowels and bladder  good. 


no signs  infection  or  cardiac  issues   but she is  obese.     [facility] 


  





4/5/20


  afebrile


vss/


vomiting  this  am  with  grossly  greenish chochlate colored  emisis.   

suction  turned  back on and  immediately  felt  better. 


on  octreotide and  reglan p.o . 


  n.g  output  550   before  emesis/  i/os   balanced .  


b.s  elavated  160.


p.e.


  unchanged  other  than  low  b.s  heard .   no  tenderness .


ambulating  well and will  try   to  maximize  reglan and trial of  rectal  

tube  discussed .  


she  agrees . 


taking   tramadol  p.o  and  known  to  slow   gastric  empting,


   but   sbo   obstruction   considered  main  problem.


pain  control  good  and  will  try  to  decrease  dose    further 





 lab  


  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   

culture.  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


 kleibsiella  in urine  treated with  rocephin x  one  dose  on  3/29 .





surgical  consult  recommends pic   line    instead  of  central line  at  this

  time.  oncology   o.p.  follow  up  with  detailed   discussion  about  chemo

  options  pending   resolution  of ileus . 





discussed  with  family  and  options  for  home   n.g .  suctioning  

intermittantly  not  known ,


 but   consulting  home  health and  family  willing    in  order to  get her  

home.    [facility]  





4/6/20


History of ovarian cancer with Mets 


On comfort care at this point


Vomiting resolved


NPO--> advanced to pleasure feeds 


Bowel sounds wax and wane 


Abdominal x-ray today shows no real improvement 


1100ml output through NG tube


Ambulating


Rectal tube placed and removed yesterday after patient discomfort


Home health unable to care for NG tube at home


[facility] reports no way for home suctioning


Family wants patient to return home, however after last night they admit they 

cannot possibly take care of her


Repeat blood cultures negative x1 day


Mental status improved 


Last BM was 2/22/20





Plan:


1) Continue NG tube


2) Discontinued lovenox


3) Pleasure feeds but caution with plugging NG tube


4) Consider PICC line/TNP/Lipids - family will discuss and get back to us


5) Stop abx while on comfort care


6) Resume octreotide 


7) Confirmed with patient and family comfort care status, No lab draws, No 

further surgical procedures, Meds to keep comfortable


8) Family and patient agree to explore placement. Will need ability for NG tube 

and possibly TPN/Lipids. SW aware and looking into options

## 2020-04-06 NOTE — CR
Chest: AP view of the chest was obtained.

 

Comparison: Prior chest x-ray of 03/26/20.

 

Central lung markings slightly increased possibly due to mild 

bronchitis.  Slight atelectasis is also felt to be present within the 

left lung base.  Lungs otherwise are clear.  Nasogastric tube courses 

through the mediastinum.  Tip lies below the inferior edge of the 

film.  Bony structures are grossly intact.

 

Impression:

1.  Mild atelectasis within the left lung base.

2.  Mild increased central lung markings most likely due to 

bronchitis.

 

Diagnostic code #3

 

This report was dictated in MDT

## 2020-04-07 RX ADMIN — SCOPALAMINE SCH MG: 1 PATCH, EXTENDED RELEASE TRANSDERMAL at 20:52

## 2020-04-07 RX ADMIN — Medication SCH: at 20:53

## 2020-04-07 NOTE — PCM.PN
- General Info


Date of Service: 04/07/20


Admission Dx/Problem (Free Text): 


 Admission Diagnosis/Problem





Admission Diagnosis/Problem      Urinary tract infection





79 year old female admitted on 3/26/2020 with complaints of abdominal pain 

after she was hospitalized and had surgery to remove her uterus and colon 

lesions at [facility] on 3/18/2020


Functional Status: Reports: Pain Controlled, Ambulating, Urinating.  Denies: 

Tolerating Diet (NPO), New Symptoms





- Review of Systems


General: Reports: Fatigue.  Denies: Fever, Weakness, Malaise, Chills


HEENT: Reports: No Symptoms, Sore Throat (2/2 NG Tube ), Other (NG Tube in 

place to LIS).  Denies: Headaches


Pulmonary: Reports: No Symptoms.  Denies: Shortness of Breath, Pleuritic Chest 

Pain, Cough, Wheezing


Cardiovascular: Reports: No Symptoms.  Denies: Chest Pain, Palpitations, 

Dyspnea on Exertion


Gastrointestinal: Reports: Constipation, Decreased Appetite.  Denies: Abdominal 

Pain, Diarrhea, Flatus, Nausea, Vomiting


Genitourinary: Reports: No Symptoms.  Denies: Pain


Musculoskeletal: Reports: No Symptoms


Skin: Reports: No Symptoms.  Denies: Cyanosis


Neurological: Denies: Confusion, Pre-Existing Deficit, Difficulty Walking, Gait 

Disturbance


Psychiatric: Reports: No Symptoms





- Patient Data


Vitals - Most Recent: 


 Last Vital Signs











Temp  98.1 F   04/06/20 20:08


 


Pulse  92   04/06/20 20:08


 


Resp  20   04/06/20 20:08


 


BP  133/72   04/06/20 20:08


 


Pulse Ox  95   04/06/20 20:08











Weight - Most Recent: 248 lb 3.2 oz


I&O - Last 24 Hours: 


 Intake & Output











 04/06/20 04/07/20 04/07/20





 22:59 06:59 14:59


 


Intake Total 665 60 


 


Output Total 1400 1000 


 


Balance -025 940 











Lab Results Last 24 Hours: 


 Laboratory Results - last 24 hr











  04/06/20 04/06/20 04/07/20 Range/Units





  09:26 09:26 00:10 


 


WBC  6.85    (3.98-10.04)  K/mm3


 


RBC  4.10    (3.98-5.22)  M/mm3


 


Hgb  12.1    (11.2-15.7)  gm/dl


 


Hct  39.7    (34.1-44.9)  %


 


MCV  96.8 H    (79.4-94.8)  fl


 


MCH  29.5    (25.6-32.2)  pg


 


MCHC  30.5 L    (32.2-35.5)  g/dl


 


RDW Std Deviation  46.8 H    (36.4-46.3)  fL


 


Plt Count  374 H    (182-369)  K/mm3


 


MPV  10.3    (9.4-12.3)  fl


 


Neut % (Auto)  62.3    (34.0-71.1)  %


 


Lymph % (Auto)  16.4 L    (19.3-51.7)  %


 


Mono % (Auto)  20.0 H    (4.7-12.5)  %


 


Eos % (Auto)  0.9    (0.7-5.8)  


 


Baso % (Auto)  0.3    (0.1-1.2)  %


 


Neut # (Auto)  4.27    (1.56-6.13)  K/mm3


 


Lymph # (Auto)  1.12 L    (1.18-3.74)  K/mm3


 


Mono # (Auto)  1.37 H    (0.24-0.36)  K/mm3


 


Eos # (Auto)  0.06    (0.04-0.36)  K/mm3


 


Baso # (Auto)  0.02    (0.01-0.08)  K/mm3


 


Sodium   143   (136-145)  mEq/L


 


Potassium   3.8   (3.5-5.1)  mEq/L


 


Chloride   107   ()  mEq/L


 


Carbon Dioxide   27   (21-32)  mEq/L


 


Anion Gap   12.8   (5-15)  


 


BUN   19 H   (7-18)  mg/dL


 


Creatinine   1.3 H   (0.55-1.02)  mg/dL


 


Est Cr Clr Drug Dosing   31.06   mL/min


 


Estimated GFR (MDRD)   39   (>60)  mL/min


 


BUN/Creatinine Ratio   14.6   (14-18)  


 


Glucose   140 H   ()  mg/dL


 


POC Glucose    83  ()  mg/dL


 


Calcium   8.1 L   (8.5-10.1)  mg/dL


 


Phosphorus   2.9   (2.6-4.7)  mg/dL


 


Magnesium   1.8   (1.8-2.4)  mg/dl


 


Total Bilirubin   0.3   (0.2-1.0)  mg/dL


 


AST   43 H   (15-37)  U/L


 


ALT   61 H   (14-59)  U/L


 


Alkaline Phosphatase   101   ()  U/L


 


Total Protein   5.9 L   (6.4-8.2)  g/dl


 


Albumin   2.2 L   (3.4-5.0)  g/dl


 


Globulin   3.7   gm/dL


 


Albumin/Globulin Ratio   0.6 L   (1-2)  














  04/07/20 Range/Units





  05:39 


 


WBC   (3.98-10.04)  K/mm3


 


RBC   (3.98-5.22)  M/mm3


 


Hgb   (11.2-15.7)  gm/dl


 


Hct   (34.1-44.9)  %


 


MCV   (79.4-94.8)  fl


 


MCH   (25.6-32.2)  pg


 


MCHC   (32.2-35.5)  g/dl


 


RDW Std Deviation   (36.4-46.3)  fL


 


Plt Count   (182-369)  K/mm3


 


MPV   (9.4-12.3)  fl


 


Neut % (Auto)   (34.0-71.1)  %


 


Lymph % (Auto)   (19.3-51.7)  %


 


Mono % (Auto)   (4.7-12.5)  %


 


Eos % (Auto)   (0.7-5.8)  


 


Baso % (Auto)   (0.1-1.2)  %


 


Neut # (Auto)   (1.56-6.13)  K/mm3


 


Lymph # (Auto)   (1.18-3.74)  K/mm3


 


Mono # (Auto)   (0.24-0.36)  K/mm3


 


Eos # (Auto)   (0.04-0.36)  K/mm3


 


Baso # (Auto)   (0.01-0.08)  K/mm3


 


Sodium   (136-145)  mEq/L


 


Potassium   (3.5-5.1)  mEq/L


 


Chloride   ()  mEq/L


 


Carbon Dioxide   (21-32)  mEq/L


 


Anion Gap   (5-15)  


 


BUN   (7-18)  mg/dL


 


Creatinine   (0.55-1.02)  mg/dL


 


Est Cr Clr Drug Dosing   mL/min


 


Estimated GFR (MDRD)   (>60)  mL/min


 


BUN/Creatinine Ratio   (14-18)  


 


Glucose   ()  mg/dL


 


POC Glucose  80 L  ()  mg/dL


 


Calcium   (8.5-10.1)  mg/dL


 


Phosphorus   (2.6-4.7)  mg/dL


 


Magnesium   (1.8-2.4)  mg/dl


 


Total Bilirubin   (0.2-1.0)  mg/dL


 


AST   (15-37)  U/L


 


ALT   (14-59)  U/L


 


Alkaline Phosphatase   ()  U/L


 


Total Protein   (6.4-8.2)  g/dl


 


Albumin   (3.4-5.0)  g/dl


 


Globulin   gm/dL


 


Albumin/Globulin Ratio   (1-2)  











Sa[facility]l Results Last 24 Hours: 


 Microbiology











 04/05/20 13:48 Aerobic Blood Culture - Preliminary





 Blood - Venous - Lab Draw    NO GROWTH AFTER 1 DAY





 Anaerobic Blood Culture - Preliminary





    NO GROWTH AFTER 1 DAY


 


 04/05/20 14:03 Aerobic Blood Culture - Preliminary





 Blood - Venous    NO GROWTH AFTER 1 DAY





 Anaerobic Blood Culture - Preliminary





    NO GROWTH AFTER 1 DAY











Med Orders - Current: 


 Current Medications





Acetaminophen (Tylenol)  650 mg PO Q4H PRN


   PRN Reason: Pain/Fever


Albuterol (Proventil Neb Soln)  2.5 mg NEB Q15M PRN


   PRN Reason: Shortness of Breath


Artificial Tears (Refresh Liquigel 1%)  1 - 2 ml EYEBOTH QID PRN; Protocol


   PRN Reason: Dry Eyes


Benzocaine/Menthol (Cepacol Sore Throat)  1 lozenge MUCMEM Q2H PRN


   PRN Reason: Sore throat 


Dextrose/Water (Dextrose 50% In Water)  50 ml IVPUSH ASDIRECTED PRN


   PRN Reason: Hypoglycemia


Haloperidol (Haldol)  1 mg PO Q1H PRN


   PRN Reason: delirium or restlessness


Haloperidol Lactate (Haldol)  1 mg IVPUSH Q1H PRN


   PRN Reason: delirium or restlessness


Metoclopramide HCl (Reglan)  10 mg PO Q6H PRN


   PRN Reason: Nausea


Metoclopramide HCl (Reglan)  10 mg IVPUSH Q6H PRN


   PRN Reason: Nausea


Miscellaneous Information (Remove Patch)  1 ea TRDERM Q72H [facility]


   Last Admin: 04/04/20 23:44 Dose:  1 ea


Morphine Sulfate (Morphine)  2 mg SUBCUT Q30M PRN


   PRN Reason: Pain or Shortness of breath


Morphine Sulfate (Morphine)  2 mg IVPUSH Q30M PRN


   PRN Reason: Pain or Shortness of breath


   Last Admin: 04/07/20 02:24 Dose:  2 mg


Octreotide Acetate (Octreotide)  100 mcg SUBCUT QID [facility]


   Last Admin: 04/06/20 20:11 Dose:  100 mcg


Scopolamine (Transderm-Scop)  1.5 mg TRDERM Q72H [facility]


   Last Admin: 04/04/20 20:27 Dose:  1.5 mg


Sodium Chloride (Saline Flush)  10 ml FLUSH ASDIRECTED PRN


   PRN Reason: Keep Vein Open


   Last Admin: 03/26/20 12:18 Dose:  10 ml





Discontinued Medications





Acetaminophen (Tylenol)  650 mg PO Q4H PRN


   PRN Reason: Pain (Mild 1-3)/fever


   Last Admin: 03/28/20 05:07 Dose:  650 mg


Al Hydroxide/Mg Hydroxide (Mag-Al Plus)  30 ml PO ONETIME ONE


   Stop: 03/26/20 13:42


   Last Admin: 03/26/20 13:47 Dose:  30 ml


Apixaban (Eliquis)  5 mg PO BID [facility]


   Last Admin: 03/29/20 08:45 Dose:  5 mg


Benzocaine (Hurricaine 20% Spray)  0 ml MUCMEM Q2H PRN


   PRN Reason: Sore Throat


   Last Admin: 03/31/20 15:19 Dose:  1 spray


Benzocaine/Menthol (Cepacol Sore Throat)  1 lozenge MUCMEM Q2HR PRN


   PRN Reason: Sore Throat


Bisacodyl (Dulcolax)  10 mg RECTAL ONETIME ONE


   Stop: 04/01/20 11:55


   Last Admin: 04/01/20 16:35 Dose:  Not Given


Bisacodyl (Dulcolax)  10 mg RECTAL ONETIME ONE


   Stop: 04/02/20 08:18


   Last Admin: 04/02/20 09:58 Dose:  10 mg


Calcium Polycarbophil (Fibercon)  1,250 mg PO DAILY [facility]


   Last Admin: 03/30/20 11:01 Dose:  Not Given


Cefdinir (Omnicef)  300 mg PO BID [facility]


   Last Admin: 03/29/20 08:45 Dose:  300 mg


Dexamethasone (Dexamethasone)  2 mg IVPUSH ONETIME ONE


   Stop: 04/05/20 21:10


   Last Admin: 04/06/20 07:45 Dose:  Not Given


Enoxaparin Sodium (Lovenox)  110 mg SUBCUT Q12H [facility]


   Last Admin: 04/05/20 20:47 Dose:  110 mg


Famotidine (Pepcid)  20 mg IVPUSH ONETIME ONE


   Stop: 03/26/20 14:33


   Last Admin: 03/26/20 14:38 Dose:  20 mg


Famotidine (Pepcid)  20 mg PO DAILY [facility]


   Last Admin: 03/28/20 08:14 Dose:  20 mg


Famotidine (Pepcid)  20 mg PO BID [facility]


   Last Admin: 03/29/20 08:45 Dose:  20 mg


Famotidine (Pepcid)  20 mg IVPUSH DAILY [facility]


Famotidine (Pepcid)  20 mg IVPUSH DAILY [facility]


   Last Admin: 04/03/20 08:49 Dose:  20 mg


Furosemide (Lasix)  20 mg IVPUSH NOW ONE


   Stop: 04/02/20 08:03


   Last Admin: 04/02/20 08:34 Dose:  10 mg


Furosemide (Lasix)  40 mg IVPUSH NOW ONE


   Stop: 04/03/20 10:16


   Last Admin: 04/03/20 10:38 Dose:  40 mg


Haloperidol (Haldol)  1 mg PO Q1H PRN


   PRN Reason: delirium or restlessness


Sodium Chloride (Normal Saline)  [facility]


   Last Admin: 03/26/20 12:18 Dose:  150 mls/hr


Ceftriaxone Sodium 1 gm/ (Sodium Chloride)  100 mls @ 200 mls/hr IV Q24H [facility]


   Last Admin: 03/27/20 14:58 Dose:  200 mls/hr


Sodium Chloride (Normal Saline)  1,000 mls [facility]


   Stop: 03/27/20 05:49


   Last Admin: 03/26/20 16:47 Dose:  75 mls/hr


Promethazine HCl 25 mg/ Sodium (Chloride)  51 mls @ 100 mls/hr IV ONETIME ONE


   Stop: 03/26/20 18:09


   Last Admin: 03/26/20 18:32 Dose:  100 mls/hr


Promethazine HCl 25 mg/ Sodium (Chloride)  51 mls @ 100 mls/hr IV Q6H PRN


   PRN Reason: Nausea/Vomiting


Sodium Chloride (Normal Saline)  1,000 mls [facility]


   Stop: 03/28/20 17:29


   Last Admin: 03/27/20 17:44 Dose:  100 mls/hr


Dextrose/Lactated Ringer's (Dextrose 5%-Lactated Ringers)  1,000 mls @ 150 mls/

hr IV ASDIRECTED [facility]


   Last Admin: 03/31/20 07:30 Dose:  150 mls/hr


Ceftriaxone Sodium 1 gm/ (Sodium Chloride)  100 mls @ 200 mls/hr IV Q24H [facility]


   Last Admin: 03/30/20 11:40 Dose:  200 mls/hr


Lactated Ringer's (Ringers, Lactated)  1,000 mls @ 150 mls/hr IV ASDIRECTED [facility]


   Last Admin: 03/31/20 17:32 Dose:  150 mls/hr


Dextrose/Lactated Ringer's (Dextrose 5%-Lactated Ringers)  1,000 mls @ 150 mls/

hr IV ASDIRECTED [facility]


   Last Admin: 04/01/20 20:52 Dose:  150 mls/hr


Dextrose/Lactated Ringer's (Dextrose 5%-Lactated Ringers)  1,000 mls @ 50 mls/

hr IV ASDIRECTED [facility]


   Last Admin: 04/04/20 02:56 Dose:  50 mls/hr


Sodium Chloride 38.4 meq/ (Dextrose/Water)  509.6 mls @ 80 mls/hr IV ASDIRECTED 

[facility]


Sodium Chloride 76.8 meq/Potassium Chloride 20 meq/Dextrose/Water  1,029.2 mls 

@ 100 mls/hr IV ASDIRECTED [facility]


Sodium Chloride 76.8 meq/Potassium Chloride 20 meq/Dextrose/Water  1,029.2 mls 

@ 100 mls/hr IV .[id number] [facility]


   Last Admin: 04/05/20 10:27 Dose:  100 mls/hr


Ceftriaxone Sodium 2 gm/ (Sodium Chloride)  100 mls @ 200 mls/hr IV Q24H [facility]


   Last Admin: 04/05/20 15:04 Dose:  Not Given


Ceftriaxone Sodium 2 gm/ (Sodium Chloride)  100 mls @ 200 mls/hr IV Q24H [facility]


   Last Admin: 04/05/20 14:53 Dose:  200 mls/hr


Promethazine HCl 50 mg/ Sodium (Chloride)  52 mls @ 100 mls/hr IV Q6H [facility]


   Stop: 04/06/20 16:45


   Last Admin: 04/05/20 17:04 Dose:  100 mls/hr


Dextrose/Sodium Chloride (Dextrose 5%-1/2 Ns)  1,000 mls @ 150 mls/hr IV 

ASDIRECTED [facility]


   Last Admin: 04/06/20 08:58 Dose:  150 mls/hr


Promethazine HCl 12.5 mg/ (Sodium Chloride)  50.5 mls @ 100 mls/hr IV Q4H PRN


   PRN Reason: Nausea


Ceftriaxone Sodium 2 gm/ (Sodium Chloride)  100 mls @ 200 mls/hr IV Q24H [facility]


Insulin Glargine (Lantus)  25 unit SUBCUT DAILY [facility]


   Last Admin: 04/05/20 12:00 Dose:  25 units


Lorazepam (Ativan)  0.5 mg IVPUSH ONETIME ONE


   Stop: 04/05/20 18:04


   Last Admin: 04/05/20 18:00 Dose:  Not Given


Lorazepam (Ativan)  0.5 mg IVPUSH ONETIME ONE


   Stop: 04/05/20 20:01


   Last Admin: 04/05/20 20:50 Dose:  0.5 mg


Lorazepam (Ativan)  0.5 mg IVPUSH Q4H PRN


   PRN Reason: restlessness


Lorazepam (Ativan)  1 mg SUBCUT Q30M PRN


   PRN Reason: Anxiety


Lorazepam (Ativan)  1 mg IVPUSH Q15M PRN


   PRN Reason: Anxiety


Lorazepam (Ativan)  1 mg PO Q30M PRN


   PRN Reason: Anxiety


Magnesium Hydroxide (Milk Of Magnesia)  30 ml PO BID PRN


   PRN Reason: Constipation


   Last Admin: 03/29/20 05:17 Dose:  30 ml


Melatonin (Melatonin)  6 mg PO BEDTIME PRN


   PRN Reason: Sleep


   Last Admin: 04/04/20 20:38 Dose:  6 mg


Metoclopramide HCl (Reglan)  10 mg IVPUSH Q6H [facility]


   Stop: 04/01/20 21:16


   Last Admin: 04/01/20 12:12 Dose:  Not Given


Metoclopramide HCl (Reglan)  10 mg IVPUSH Q6H [facility]


   Stop: 04/01/20 23:01


   Last Admin: 04/01/20 23:15 Dose:  10 mg


Metoclopramide HCl (Reglan)  10 mg PO Q8H [facility]


   Last Admin: 04/05/20 14:52 Dose:  10 mg


Morphine Sulfate (Morphine)  2 mg IVPUSH Q4H PRN


   PRN Reason: Pain


   Last Admin: 04/06/20 04:17 Dose:  2 mg


Multivitamins (Thera)  1 each PO DAILY [facility]


   Last Admin: 03/29/20 08:46 Dose:  Not Given


Octreotide Acetate (Octreotide)  100 mcg SUBCUT TID [facility]


   Last Admin: 04/05/20 08:24 Dose:  100 mcg


Octreotide Acetate (Octreotide)  100 mcg SUBCUT QID [facility]


   Last Admin: 04/05/20 20:49 Dose:  100 mcg


Ondansetron HCl (Zofran)  4 mg IVPUSH ONETIME ONE


   Stop: 03/26/20 12:09


   Last Admin: 03/26/20 12:18 Dose:  4 mg


Ondansetron HCl (Zofran)  4 mg IVPUSH ONETIME ONE


   Stop: 03/26/20 14:31


   Last Admin: 03/26/20 14:38 Dose:  4 mg


Ondansetron HCl (Zofran)  4 mg IV Q6H PRN


   PRN Reason: Nausea/Vomiting


   Last Admin: 04/05/20 12:10 Dose:  4 mg


Ondansetron HCl (Zofran Odt)  4 mg PO Q12H [facility]


   Last Admin: 03/29/20 00:47 Dose:  4 mg


Ondansetron HCl (Zofran)  4 mg IVPUSH ONETIME ONE


   Stop: 04/05/20 15:00


   Last Admin: 04/05/20 15:13 Dose:  4 mg


Pharmacy Consult (Consult To Pharmacy)  1 each .XX ASDIRECTED [facility]


Senna/Docusate Sodium (Senna Plus)  1 tab PO BID PRN


   PRN Reason: Constipation


   Last Admin: 03/28/20 05:07 Dose:  1 tab


Senna/Docusate Sodium (Senna Plus)  1 tab PO BID [facility]


   Last Admin: 04/06/20 07:44 Dose:  Not Given


Simethicone (Simethicone)  80 mg PO QID PRN


   PRN Reason: bloating 


   Last Admin: 03/31/20 20:54 Dose:  80 mg


Tramadol HCl (Ultram)  50 mg PO Q6H PRN


   PRN Reason: Pain


   Last Admin: 04/04/20 20:38 Dose:  50 mg











- Exam


Quality Assessment: Supplemental Oxygen, Urine Catheter, DVT Prophylaxis


General: Alert, Oriented, Cooperative, No Acute Distress


HEENT: Pupils Equal, Pupils Reactive, Mucous Membr. Moist/Pink


Neck: Supple, Trachea Midline


Lungs: Clear to Auscultation, Normal Respiratory Effort


Cardiovascular: Regular Rate, Regular Rhythm


GI/Abdominal Exam: Soft, Non-Tender, No Distention, Abnormal Bowel Sounds (

Absent )


 (Female) Exam: Deferred


Extremities: Normal Inspection, Normal Range of Motion, Pedal Edema (1+)


Skin: Warm, Dry, Intact


Wound/Incisions: Healing Well.  No: Erythema


Neurological: No New Focal Deficit


Psy/Mental Status: Alert





Sepsis Event Note





- Evaluation


Sepsis Screening Result: No Definite Risk





- Focused Exam


Vital Signs: 


 Vital Signs











  Temp Pulse Resp BP Pulse Ox


 


 04/06/20 20:08  98.1 F  92  20  133/72  95











Date Exam was Performed: 04/07/20


Time Exam was Performed: 14:33





- Problem List & Annotations


(1) Nausea and vomiting


SNOMED Code(s): 78170542


   Code(s): R11.2 - NAUSEA WITH VOMITING, UNSPECIFIED   Status: Resolved   

Priority: High   Current Visit: Yes   


Qualifiers: 


   Vomiting type: unspecified   Vomiting Intractability: intractable   

Qualified Code(s): R11.2 - Nausea with vomiting, unspecified   





(2) History of ovarian cancer


Status: Chronic   Priority: Medium   Current Visit: No   





(3) Colon cancer


SNOMED Code(s): 709231333


   Code(s): C18.9 - MALIGNANT NEOPLASM OF COLON, UNSPECIFIED   Status: Acute   

Priority: High   Current Visit: Yes   


Qualifiers: 


   Colon location: unspecified part of colon   Qualified Code(s): C18.9 - 

Malignant neoplasm of colon, unspecified   





(4) Leukocytosis


SNOMED Code(s): 865944999, 319850995


   Code(s): D72.829 - ELEVATED WHITE BLOOD CELL COUNT, UNSPECIFIED   Status: 

Acute   Priority: High   Current Visit: Yes   


Qualifiers: 


   Leukocytosis type: unspecified   Qualified Code(s): D72.829 - Elevated white 

blood cell count, unspecified   





(5) Acute renal injury


SNOMED Code(s): 38970824, 77881772


   Code(s): N17.9 - ACUTE KIDNEY FAILURE, UNSPECIFIED   Status: Acute   Priority

: High   Current Visit: Yes   





(6) High anion gap metabolic acidosis


SNOMED Code(s): 46899980


   Code(s): E87.2 - ACIDOSIS   Status: Acute   Priority: High   Current Visit: 

Yes   





(7) History of venous thromboembolism


SNOMED Code(s): 533634506


   Code(s): Z86.718 - PERSONAL HISTORY OF OTHER VENOUS THROMBOSIS AND EMBOLISM 

  Status: Chronic   Priority: Medium   Current Visit: Yes   





(8) Chronic anticoagulation


SNOMED Code(s): 579066089


   Code(s): Z79.01 - LONG TERM (CURRENT) USE OF ANTICOAGULANTS   Status: 

Chronic   Priority: Low   Current Visit: Yes   





(9) HLD (hyperlipidemia)


SNOMED Code(s): 00511374


   Code(s): E78.5 - HYPERLIPIDEMIA, UNSPECIFIED   Status: Chronic   Priority: 

Low   Current Visit: No   


Qualifiers: 


   Hyperlipidemia type: unspecified   Qualified Code(s): E78.5 - Hyperlipidemia

, unspecified   





(10) GERD (gastroesophageal reflux disease)


SNOMED Code(s): 381831537


   Code(s): K21.9 - GASTRO-ESOPHAGEAL REFLUX DISEASE WITHOUT ESOPHAGITIS   

Status: Chronic   Priority: Medium   Current Visit: No   


Qualifiers: 


   Esophagitis presence: esophagitis presence not specified   Qualified Code(s)

: K21.9 - Gastro-esophageal reflux disease without esophagitis   





(11) Obesity


SNOMED Code(s): 554422166, 299102033


   Code(s): E66.9 - OBESITY, UNSPECIFIED   Status: Chronic   Priority: Medium   

Current Visit: No   


Qualifiers: 


   Obesity type: unspecified obesity type   Obesity classification: adult class 

2 (BMI 35 - 39.9)   Body mass index: BMI 38.0-38.9 





(12) Hematochezia


SNOMED Code(s): 813508321


   Code(s): K92.1 - MELENA   Status: Chronic   Priority: Medium   Current Visit

: Yes   





(13) Urinary tract infection


SNOMED Code(s): 65953013


   Code(s): N39.0 - URINARY TRACT INFECTION, SITE NOT SPECIFIED   Status: Acute

   Priority: Low   Current Visit: Yes   Onset Date: 03/31/20   


Qualifiers: 


   Urinary tract infection type: site unspecified   Hematuria presence: with 

hematuria   Qualified Code(s): N39.0 - Urinary tract infection, site not 

specified; R31.9 - Hematuria, unspecified   


Annotation/Comment:: klebsiella     treated   with  single  dose  rocephin  and

  not   continued .


  4/4/20 4/5/20


positive  campnocytophagia  aenerobic  organism   from  mouth or  g.i  tract. 

on  blood  culture  1/2.  will   reculture  and consider  emperic  antibiotic   

treatment    





(14) S/P laparotomy


SNOMED Code(s): 764883348, 627204509, 782752634


   Code(s): Z98.890 - OTHER SPECIFIED POSTPROCEDURAL STATES   Status: Acute   

Priority: Medium   Current Visit: Yes   Onset Date: 03/31/20   Annotation/

Comment:: ileus  not  resolving  and will  start  octreotide  and cont  reglan  

today .


4/4/20


 doing   well   tolerating  meds  but  little  improvemtn other than n.g  

output  less.


4/5/20


obstruction  not  resolving   but will increase  reglan and try  rectal  tube. 

decrease  tramidol


   





(15) Volume depletion


SNOMED Code(s): 853880377


   Code(s): E86.9 - VOLUME DEPLETION, UNSPECIFIED   Status: Acute   Current 

Visit: Yes   





(16) Chronic constipation


SNOMED Code(s): 292959512


   Code(s): K59.09 - OTHER CONSTIPATION   Status: Chronic   Priority: High   

Current Visit: Yes   





(17) Small bowel obstruction


SNOMED Code(s): 440030931


   Code(s): K56.609 - UNSP INTESTNL OBST, UNSP AS TO PARTIAL VERSUS COMPLETE 

OBST   Status: Acute   Priority: High   Current Visit: Yes   Onset Date: 03/31/ 20   Annotation/Comment:: discussed  with  herself and family and minimal  abd  

pain and cont   cons.  treatment add  octreotide.n.g   and  iv  both  decreased 

/  i/os  neg  1000    





(18) Ovarian cancer


SNOMED Code(s): 091997965


   Code(s): C56.9 - MALIGNANT NEOPLASM OF UNSPECIFIED OVARY   Status: Acute   

Priority: High   Current Visit: Yes   Onset Date: 03/31/20   


Qualifiers: 


   Laterality: unspecified laterality   Qualified Code(s): C56.9 - Malignant 

neoplasm of unspecified ovary   





(19) Need for comfort care


SNOMED Code(s): 890896006, 398525145


   Code(s): BRJ1228 -    Status: Acute   Priority: High   Current Visit: Yes   





- Problem List Review


Problem List Initiated/Reviewed/Updated: Yes





- My Orders


Last 24 Hours: 


My Active Orders





04/07/20 07:38


Benzocaine/Cetylpyrd/Menthol [Cepacol Sore Throat]   1 lozenge MUCMEM Q2H PRN 














- Assessment


Assessment:: 





4/3/2020


She slept well through the night


She is NPO


She has a NG tube placed


She is walking with a walker as tolerated x1 assist





Discussed that she is allowed to have ice chips


Explained octreotide to her since she was refusing because she didn't 

understand it


Discussed getting labs of CMP, Mag, and Phos4/4/20


 


afebrile  


vss


  ng output  250  cc  last  12  hours.


  no abd  pain  unless  moving and  achey .passing  little  gas .  no  b.m 


p.e.  unchanged  and  no  tenderness and  clear  greenish  fluid  in   n.g. 

tube .


lab  mild  elavation  of  lfts with  stable  creatinine. cbc  unchanged.


kub   persistant  ileus  partially  resolved.


no  air  fluid   levels .


 minimal   tenderness  or  none. 





 assess:


1) ileus  continues   but  slow  improvement over  6  days  of  n.g  suction.  

will clamp  tube  every  2  hours  and see if  she   tolerates.


 on  octratide and  reglan and  tolerating well.


2)ovarian  cancer  widely  metastatic and  patient  desires  to  go  home .  

home  suction  devices  being  sought   by  family .


 3) npo x  6  days and will need to  consider  tpn for   chronic  nutrition  

needs and   she is  discussing  that with family .  would  need a  gil cath  

placed and she is not  sure  she  wants to  do   that  or  tpn.  supportive  

care and  hospice  brought up as  options  as well .]\chemo therapy  options   

discussed over  phone  with  oncology and they  do not  think  she   can  

tolerate  but  would  see her  back  to  discuss in    further  detail .  no  

plans  for  chemo  emergantly  as  she  has   incision  to  heal up .


functionally  doing  well  and  incision  looks   good / 


 abd  pain  minmal and walking and control of  bowels and bladder  good. 


no signs  infection  or  cardiac  issues   but she is  obese. 


  





                                                                               

           boh 





- Plan


Plan:: 


4/3/2020


She slept well through the night


She is NPO


She has a NG tube placed


She is walking with a walker as tolerated x1 assist





Discussed that she is allowed to have ice chips


Explained octreotide to her since she was refusing because she didn't 

understand it


Discussed getting labs of CMP, Mag, and Phos4/4/20


 


afebrile  


vss


  ng output  250  cc  last  12  hours.


  no abd  pain  unless  moving and  achey .passing  little  gas .  no  b.m 


p.e.  unchanged  and  no  tenderness and  clear  greenish  fluid  in   n.g. 

tube .


lab  mild  elavation  of  lfts with  stable  creatinine. cbc  unchanged.


kub   persistant  ileus  partially  resolved.


no  air  fluid   levels .


 minimal   tenderness  or  none. 





 assess:


1) ileus  continues   but  slow  improvement over  6  days  of  n.g  suction.  

will clamp  tube  every  2  hours  and see if  she   tolerates.


 on  octratide and  reglan and  tolerating well.


2)ovarian  cancer  widely  metastatic and  patient  desires  to  go  home .  

home  suction  devices  being  sought   by  family .


 3) npo x  6  days and will need to  consider  tpn for   chronic  nutrition  

needs and   she is  discussing  that with family .  would  need a  gil cath  

placed and she is not  sure  she  wants to  do   that  or  tpn.  supportive  

care and  hospice  brought up as  options  as well .]\chemo therapy  options   

discussed over  phone  with  oncology and they  do not  think  she   can  

tolerate  but  would  see her  back  to  discuss in    further  detail .  no  

plans  for  chemo  emergantly  as  she  has   incision  to  heal up .


functionally  doing  well  and  incision  looks   good / 


 abd  pain  minmal and walking and control of  bowels and bladder  good. 


no signs  infection  or  cardiac  issues   but she is  obese. 


  


4/4/20


 


afebrile  


vss


  ng output  250  cc  last  12  hours.


  no abd  pain  unless  moving and  achey .passing  little  gas .  no  b.m 


p.e.  unchanged  and  no  tenderness and  clear  greenish  fluid  in   n.g. 

tube .


lab  mild  elavation  of  lfts with  stable  creatinine. cbc  unchanged.


kub   persistant  ileus  partially  resolved.


no  air  fluid   levels .


 minimal   tenderness  or  none. 





 assess:


1) ileus  continues   but  slow  improvement over  6  days  of  n.g  suction.  

will clamp  tube  every  2  hours  and see if  she   tolerates.


 on  octreotide and  reglan and  tolerating well.


2)ovarian  cancer  widely  metastatic and  patient  desires  to  go  home .  

home  suction  devices  being  sought   by  family .


 3) npo x  6  days and will need to  consider  tpn for   chronic  nutrition  

needs and   she is  discussing  that with family .  would  need a  gil cath  

placed and she is not  sure  she  wants to  do   that  or  tpn.  supportive  

care and  hospice  brought up as  options  as well .]\chemo therapy  options   

discussed over  phone  with  oncology and they  do not  think  she   can  

tolerate  but  would  see her  back  to  discuss in    further  detail .  no  

plans  for  chemo  emergantly  as  she  has   incision  to  heal up .


functionally  doing  well  and  incision  looks   good / 


 abd  pain  minmal and walking and control of  bowels and bladder  good. 


no signs  infection  or  cardiac  issues   but she is  obese.     boh 


  





4/5/20


  afebrile


vss/


vomiting  this  am  with  grossly  greenish chochlate colored  emisis.   

suction  turned  back on and  immediately  felt  better. 


on  octreotide and  reglan p.o . 


  n.g  output  550   before  emesis/  i/os   balanced .  


b.s  elavated  160.


p.e.


  unchanged  other  than  low  b.s  heard .   no  tenderness .


ambulating  well and will  try   to  maximize  reglan and trial of  rectal  

tube  discussed .  


she  agrees . 


taking   tramadol  p.o  and  known  to  slow   gastric  empting,


   but   sbo   obstruction   considered  main  problem.


pain  control  good  and  will  try  to  decrease  dose    further 





 lab  


  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   

culture.  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


 kleibsiella  in urine  treated with  rocephin x  one  dose  on  3/29 .





surgical  consult  recommends pic   line    instead  of  central line  at  this

  time.  oncology   o.p.  follow  up  with  detailed   discussion  about  chemo

  options  pending   resolution  of ileus . 





discussed  with  family  and  options  for  home   n.g .  suctioning  

intermittantly  not  known ,


 but   consulting  home  health and  family  willing    in  order to  get her  

home.    boh  





4/6/20


History of ovarian cancer with Mets 


On comfort care at this point


Vomiting resolved


NPO--> advanced to pleasure feeds 


Bowel sounds wax and wane 


Abdominal x-ray today shows no real improvement 


1100ml output through NG tube


Ambulating


Rectal tube placed and removed yesterday after patient discomfort


Home health unable to care for NG tube at home


[facility] reports no way for home suctioning


Family wants patient to return home, however after last night they admit they 

cannot possibly take care of her


Repeat blood cultures negative x1 day


Mental status improved 


Last BM was 2/22/20





Plan:


1) Continue NG tube


2) Discontinued lovenox


3) Pleasure feeds but caution with plugging NG tube


4) Consider PICC line/TNP/Lipids - family will discuss and get back to us


5) Stop abx while on comfort care


6) Resume octreotide 


7) Confirmed with patient and family comfort care status, No lab draws, No 

further surgical procedures, Meds to keep comfortable


8) Family and patient agree to explore placement. Will need ability for NG tube 

and possibly TPN/Lipids. SW aware and looking into options





4/7/2


Discussed plan with patient and family - wished for us to discuss with Dr. Yoo, Oncology with [facility] again


Dr. Yoo contacted. Agrees nothing more to offer patient. Believes risk 

outweigh benefits of chemotherapy. Recommends comfort care or Hospice.


Discussed TPN/Lipids with patient and family. All are in agreement they do not 

want at this point 


Absent bowel sounds


No nausea or vomiting


Continued normal mental status


No BMs or Flatus





Plan:


1) Continue NG tube to LIS


2) Continue Lovenox


3) QShift vital signs


4) Continue Octreotide for secretion control


5) Home lozenges for throat irritation from NG tube


6) Family would like placement at [facility] - SW working on this


7) Patient requesting martinez catheter remain for comfort care - Nursing OK to 

remove if patient changes mind


8) No parenteral feeding per patient


9) Discharge on comfort care once placement arranged

## 2020-04-08 NOTE — PCM.PN
- General Info


Date of Service: 04/08/20


Admission Dx/Problem (Free Text): 


 Admission Diagnosis/Problem





Admission Diagnosis/Problem      Urinary tract infection





79 year old female admitted on 3/26/2020 with complaints of abdominal pain 

after she was hospitalized and had surgery to remove her uterus and colon 

lesions at [facility] on 3/18/2020


Functional Status: Reports: Pain Controlled, Urinating.  Denies: Tolerating 

Diet (NPO), Ambulating, New Symptoms





- Review of Systems


General: Reports: Weakness, Fatigue, Malaise.  Denies: Fever, Chills


HEENT: Reports: No Symptoms.  Denies: Headaches, Sore Throat


Pulmonary: Reports: No Symptoms.  Denies: Shortness of Breath, Pleuritic Chest 

Pain, Cough, Sputum, Wheezing


Cardiovascular: Reports: No Symptoms.  Denies: Chest Pain, Palpitations


Gastrointestinal: Reports: Constipation, Decreased Appetite.  Denies: Abdominal 

Pain, Diarrhea, Flatus, Nausea, Vomiting


Genitourinary: Reports: No Symptoms.  Denies: Pain


Musculoskeletal: Reports: No Symptoms


Skin: Reports: No Symptoms.  Denies: Cyanosis


Neurological: Reports: No Symptoms.  Denies: Confusion


Psychiatric: Reports: No Symptoms





- Patient Data


Vitals - Most Recent: 


 Last Vital Signs











Temp  97.5 F   04/08/20 09:27


 


Pulse  79   04/08/20 09:27


 


Resp  16   04/08/20 09:27


 


BP  116/48 L  04/08/20 09:27


 


Pulse Ox  96   04/08/20 09:27











Weight - Most Recent: 248 lb 3.2 oz


I&O - Last 24 Hours: 


 Intake & Output











 04/07/20 04/08/20 04/08/20





 22:59 06:59 14:59


 


Intake Total 60 140 


 


Output Total 425 650 


 


Balance -365 -510 











Saúl Results Last 24 Hours: 


 Microbiology











 03/26/20 15:18 Aerobic Blood Culture - Final





 Blood - Venous    Capnoctyophaga Gingivalis





 Anaerobic Blood Culture - Final





    NO GROWTH AFTER 7 DAYS


 


 04/05/20 13:48 Aerobic Blood Culture - Preliminary





 Blood - Venous - Lab Draw    NO GROWTH AFTER 2 DAYS





 Anaerobic Blood Culture - Preliminary





    NO GROWTH AFTER 2 DAYS


 


 04/05/20 14:03 Aerobic Blood Culture - Preliminary





 Blood - Venous    NO GROWTH AFTER 2 DAYS





 Anaerobic Blood Culture - Preliminary





    NO GROWTH AFTER 2 DAYS











Med Orders - Current: 


 Current Medications





Acetaminophen (Tylenol)  650 mg PO Q4H PRN


   PRN Reason: Pain/Fever


Albuterol (Proventil Neb Soln)  2.5 mg NEB Q15M PRN


   PRN Reason: Shortness of Breath


Artificial Tears (Refresh Liquigel 1%)  1 - 2 ml EYEBOTH QID PRN; Protocol


   PRN Reason: Dry Eyes


Benzocaine/Menthol (Cepacol Sore Throat)  1 lozenge MUCMEM Q2H PRN


   PRN Reason: Sore throat 


Haloperidol (Haldol)  1 mg PO Q1H PRN


   PRN Reason: delirium or restlessness


Haloperidol Lactate (Haldol)  1 mg IVPUSH Q1H PRN


   PRN Reason: delirium or restlessness


Metoclopramide HCl (Reglan)  10 mg PO Q6H PRN


   PRN Reason: Nausea


Metoclopramide HCl (Reglan)  10 mg IVPUSH Q6H PRN


   PRN Reason: Nausea


Miscellaneous Information (Remove Patch)  1 ea TRDERM Q72H [facility]


   Last Admin: 04/07/20 20:53 Dose:  Not Given


Morphine Sulfate (Morphine)  2 mg SUBCUT Q30M PRN


   PRN Reason: Pain or Shortness of breath


Morphine Sulfate (Morphine)  2 mg IVPUSH Q30M PRN


   PRN Reason: Pain or Shortness of breath


   Last Admin: 04/08/20 01:16 Dose:  2 mg


Octreotide Acetate (Octreotide)  100 mcg SUBCUT QID [facility]


   Last Admin: 04/08/20 09:28 Dose:  100 mcg


Scopolamine (Transderm-Scop)  1.5 mg TRDERM Q72H [facility]


   Last Admin: 04/07/20 20:52 Dose:  1.5 mg


Sodium Chloride (Saline Flush)  10 ml FLUSH ASDIRECTED PRN


   PRN Reason: Keep Vein Open


   Last Admin: 03/26/20 12:18 Dose:  10 ml





Discontinued Medications





Acetaminophen (Tylenol)  650 mg PO Q4H PRN


   PRN Reason: Pain (Mild 1-3)/fever


   Last Admin: 03/28/20 05:07 Dose:  650 mg


Al Hydroxide/Mg Hydroxide (Mag-Al Plus)  30 ml PO ONETIME ONE


   Stop: 03/26/20 13:42


   Last Admin: 03/26/20 13:47 Dose:  30 ml


Apixaban (Eliquis)  5 mg PO BID [facility]


   Last Admin: 03/29/20 08:45 Dose:  5 mg


Benzocaine (Hurricaine 20% Spray)  0 ml MUCMEM Q2H PRN


   PRN Reason: Sore Throat


   Last Admin: 03/31/20 15:19 Dose:  1 spray


Benzocaine/Menthol (Cepacol Sore Throat)  1 lozenge MUCMEM Q2HR PRN


   PRN Reason: Sore Throat


Bisacodyl (Dulcolax)  10 mg RECTAL ONETIME ONE


   Stop: 04/01/20 11:55


   Last Admin: 04/01/20 16:35 Dose:  Not Given


Bisacodyl (Dulcolax)  10 mg RECTAL ONETIME ONE


   Stop: 04/02/20 08:18


   Last Admin: 04/02/20 09:58 Dose:  10 mg


Calcium Polycarbophil (Fibercon)  1,250 mg PO DAILY [facility]


   Last Admin: 03/30/20 11:01 Dose:  Not Given


Cefdinir (Omnicef)  300 mg PO BID [facility]


   Last Admin: 03/29/20 08:45 Dose:  300 mg


Dexamethasone (Dexamethasone)  2 mg IVPUSH ONETIME ONE


   Stop: 04/05/20 21:10


   Last Admin: 04/06/20 07:45 Dose:  Not Given


Dextrose/Water (Dextrose 50% In Water)  50 ml IVPUSH ASDIRECTED PRN


   PRN Reason: Hypoglycemia


Enoxaparin Sodium (Lovenox)  110 mg SUBCUT Q12H [facility]


   Last Admin: 04/05/20 20:47 Dose:  110 mg


Famotidine (Pepcid)  20 mg IVPUSH ONETIME ONE


   Stop: 03/26/20 14:33


   Last Admin: 03/26/20 14:38 Dose:  20 mg


Famotidine (Pepcid)  20 mg PO DAILY [facility]


   Last Admin: 03/28/20 08:14 Dose:  20 mg


Famotidine (Pepcid)  20 mg PO BID [facility]


   Last Admin: 03/29/20 08:45 Dose:  20 mg


Famotidine (Pepcid)  20 mg IVPUSH DAILY [facility]


Famotidine (Pepcid)  20 mg IVPUSH DAILY [facility]


   Last Admin: 04/03/20 08:49 Dose:  20 mg


Furosemide (Lasix)  20 mg IVPUSH NOW ONE


   Stop: 04/02/20 08:03


   Last Admin: 04/02/20 08:34 Dose:  10 mg


Furosemide (Lasix)  40 mg IVPUSH NOW ONE


   Stop: 04/03/20 10:16


   Last Admin: 04/03/20 10:38 Dose:  40 mg


Haloperidol (Haldol)  1 mg PO Q1H PRN


   PRN Reason: delirium or restlessness


Sodium Chloride (Normal Saline)  1,000 mls [facility] 999 [facility]ls[facility]


   Last Admin: 03/26/20 12:18 Dose:  150 mls/hr


Ceftriaxone Sodium 1 gm/ (Sodium Chloride)  100 mls @ 200 mls/hr IV Q24H [facility]


   Last Admin: 03/27/20 14:58 Dose:  200 mls/hr


Sodium Chloride (Normal Saline)  1,000 mls @ 75 mls[facility]hr [facility]


   Stop: 03/27/20 05:49


   Last Admin: 03/26/20 16:47 Dose:  75 mls/hr


Promethazine HCl 25 mg/ Sodium (Chloride)  51 mls @ 100 mls/hr IV ONETIME ONE


   Stop: 03/26/20 18:09


   Last Admin: 03/26/20 18:32 Dose:  100 mls/hr


Promethazine HCl 25 mg/ Sodium (Chloride)  51 mls @ 100 mls/hr IV Q6H PRN


   PRN Reason: Nausea/Vomiting


Sodium Chloride (Normal Saline)  1,000 mls @ 100 mls[facility]hr [facility]


   Stop: 03/28/20 17:29


   Last Admin: 03/27/20 17:44 Dose:  100 mls/hr


Dextrose/Lactated Ringer's (Dextrose 5%-Lactated Ringers)  1,000 mls @ 150 [facility]

[facility]


   Last Admin: 03/31/20 07:30 Dose:  150 mls/hr


Ceftriaxone Sodium 1 gm/ (Sodium Chloride)  100 mls @ 200 mls/hr IV Q24H [facility]


   Last Admin: 03/30/20 11:40 Dose:  200 mls/hr


Lactated Ringer's (Ringers, Lactated)  1,000 mls @ 150 mls[facility]hr [facility]


   Last Admin: 03/31/20 17:32 Dose:  150 mls/hr


Dextrose/Lactated Ringer's (Dextrose 5%-Lactated Ringers)  1,000 mls @ 150 mls/

hr [facility]


   Last Admin: 04/01/20 20:52 Dose:  150 mls/hr


Dextrose/Lactated Ringer's (Dextrose 5%-Lactated Ringers)  1,000 mls @ 50 mls/

hr IV ASDIRECTED [facility]


   Last Admin: 04/04/20 02:56 Dose:  50 mls/hr


Sodium Chloride 38.4 meq/ (Dextrose/Water)  509.6 mls @ 80 mls/hr IV ASDIRECTED 

[facility]


Sodium Chloride 76.8 meq/Potassium Chloride 20 meq/Dextrose/Water  1,029.2 mls 

@ 100 mls/hr IV ASDIRECTED [facility]


Sodium Chloride 76.8 meq/Potassium Chloride 20 meq/Dextrose/Water  1,029.2 mls 

@ 100 mls/hr IV .[id number] [facility]


   Last Admin: 04/05/20 10:27 Dose:  100 mls/hr


Ceftriaxone Sodium 2 gm/ (Sodium Chloride)  100 mls @ 200 mls/hr IV Q24H [facility]


   Last Admin: 04/05/20 15:04 Dose:  Not Given


Ceftriaxone Sodium 2 gm/ (Sodium Chloride)  100 mls @ 200 mls/hr IV Q24H [facility]


   Last Admin: 04/05/20 14:53 Dose:  200 mls/hr


Promethazine HCl 50 mg/ Sodium (Chloride)  52 mls @ 100 mls/hr IV Q6H [facility]


   Stop: 04/06/20 16:45


   Last Admin: 04/05/20 17:04 Dose:  100 mls/hr


Dextrose/Sodium Chloride (Dextrose 5%-1/2 Ns)  1,000 mls @ 150 mls/hr IV 

ASDIRECTED [facility]


   Last Admin: 04/06/20 08:58 Dose:  150 mls/hr


Promethazine HCl 12.5 mg/ (Sodium Chloride)  50.5 mls @ 100 mls/hr IV Q4H PRN


   PRN Reason: Nausea


Ceftriaxone Sodium 2 gm/ (Sodium Chloride)  100 mls @ 200 mls/hr IV Q24H [facility]


Insulin Glargine (Lantus)  25 unit SUBCUT DAILY [facility]


   Last Admin: 04/05/20 12:00 Dose:  25 units


Lorazepam (Ativan)  0.5 mg IVPUSH ONETIME ONE


   Stop: 04/05/20 18:04


   Last Admin: 04/05/20 18:00 Dose:  Not Given


Lorazepam (Ativan)  0.5 mg IVPUSH ONETIME ONE


   Stop: 04/05/20 20:01


   Last Admin: 04/05/20 20:50 Dose:  0.5 mg


Lorazepam (Ativan)  0.5 mg IVPUSH Q4H PRN


   PRN Reason: restlessness


Lorazepam (Ativan)  1 mg SUBCUT Q30M PRN


   PRN Reason: Anxiety


Lorazepam (Ativan)  1 mg IVPUSH Q15M PRN


   PRN Reason: Anxiety


Lorazepam (Ativan)  1 mg PO Q30M PRN


   PRN Reason: Anxiety


Magnesium Hydroxide (Milk Of Magnesia)  30 ml PO BID PRN


   PRN Reason: Constipation


   Last Admin: 03/29/20 05:17 Dose:  30 ml


Melatonin (Melatonin)  6 mg PO BEDTIME PRN


   PRN Reason: Sleep


   Last Admin: 04/04/20 20:38 Dose:  6 mg


Metoclopramide HCl (Reglan)  10 mg IVPUSH Q6H [facility]


   Stop: 04/01/20 21:16


   Last Admin: 04/01/20 12:12 Dose:  Not Given


Metoclopramide HCl (Reglan)  10 mg IVPUSH Q6H [facility]


   Stop: 04/01/20 23:01


   Last Admin: 04/01/20 23:15 Dose:  10 mg


Metoclopramide HCl (Reglan)  10 mg PO Q8H [facility]


   Last Admin: 04/05/20 14:52 Dose:  10 mg


Morphine Sulfate (Morphine)  2 mg IVPUSH Q4H PRN


   PRN Reason: Pain


   Last Admin: 04/06/20 04:17 Dose:  2 mg


Multivitamins (Thera)  1 each PO DAILY [facility]


   Last Admin: 03/29/20 08:46 Dose:  Not Given


Octreotide Acetate (Octreotide)  100 mcg SUBCUT TID [facility]


   Last Admin: 04/05/20 08:24 Dose:  100 mcg


Octreotide Acetate (Octreotide)  100 mcg SUBCUT QID [facility]


   Last Admin: 04/05/20 20:49 Dose:  100 mcg


Ondansetron HCl (Zofran)  4 mg IVPUSH ONETIME ONE


   Stop: 03/26/20 12:09


   Last Admin: 03/26/20 12:18 Dose:  4 mg


Ondansetron HCl (Zofran)  4 mg IVPUSH ONETIME ONE


   Stop: 03/26/20 14:31


   Last Admin: 03/26/20 14:38 Dose:  4 mg


Ondansetron HCl (Zofran)  4 mg IV Q6H PRN


   PRN Reason: Nausea/Vomiting


   Last Admin: 04/05/20 12:10 Dose:  4 mg


Ondansetron HCl (Zofran Odt)  4 mg PO Q12H [facility]


   Last Admin: 03/29/20 00:47 Dose:  4 mg


Ondansetron HCl (Zofran)  4 mg IVPUSH ONETIME ONE


   Stop: 04/05/20 15:00


   Last Admin: 04/05/20 15:13 Dose:  4 mg


Pharmacy Consult (Consult To Pharmacy)  1 each .XX ASDIRECTED [facility]


Senna/Docusate Sodium (Senna Plus)  1 tab PO BID PRN


   PRN Reason: Constipation


   Last Admin: 03/28/20 05:07 Dose:  1 tab


Senna/Docusate Sodium (Senna Plus)  1 tab PO BID [facility]


   Last Admin: 04/06/20 07:44 Dose:  Not Given


Simethicone (Simethicone)  80 mg PO QID PRN


   PRN Reason: bloating 


   Last Admin: 03/31/20 20:54 Dose:  80 mg


Tramadol HCl (Ultram)  50 mg PO Q6H PRN


   PRN Reason: Pain


   Last Admin: 04/04/20 20:38 Dose:  50 mg











- Exam


Quality Assessment: Supplemental Oxygen (2L), Urine Catheter (Comfort care )


General: Alert, Oriented, Cooperative, No Acute Distress


HEENT: Pupils Equal, Pupils Reactive.  No: Mucous Membr. Moist/Pink (dry)


Neck: Supple, Trachea Midline


Lungs: Clear to Auscultation, Normal Respiratory Effort


Cardiovascular: Regular Rate, Regular Rhythm


GI/Abdominal Exam: Soft, Non-Tender, Abnormal Bowel Sounds (absent )


 (Female) Exam: Deferred


Extremities: Normal Inspection, Normal Range of Motion, Non-Tender, Normal 

Capillary Refill, Pedal Edema


Skin: Warm, Dry, Intact


Wound/Incisions: Healing Well, No Drainage.  No: Erythema


Neurological: No New Focal Deficit


Psy/Mental Status: Alert





Sepsis Event Note





- Evaluation


Sepsis Screening Result: No Definite Risk





- Focused Exam


Vital Signs: 


 Vital Signs











  Temp Pulse Resp BP Pulse Ox


 


 04/08/20 09:27  97.5 F  79  16  116/48 L  96











Date Exam was Performed: 04/08/20


Time Exam was Performed: 13:31





- Problem List & Annotations


(1) Nausea and vomiting


SNOMED Code(s): 90885972


   Code(s): R11.2 - NAUSEA WITH VOMITING, UNSPECIFIED   Status: Resolved   

Priority: High   Current Visit: Yes   


Qualifiers: 


   Vomiting type: unspecified   Vomiting Intractability: intractable   

Qualified Code(s): R11.2 - Nausea with vomiting, unspecified   





(2) History of ovarian cancer


Status: Chronic   Priority: Medium   Current Visit: No   





(3) Colon cancer


SNOMED Code(s): 760239394


   Code(s): C18.9 - MALIGNANT NEOPLASM OF COLON, UNSPECIFIED   Status: Acute   

Priority: High   Current Visit: Yes   


Qualifiers: 


   Colon location: unspecified part of colon   Qualified Code(s): C18.9 - 

Malignant neoplasm of colon, unspecified   





(4) Leukocytosis


SNOMED Code(s): 810922161, 778639064


   Code(s): D72.829 - ELEVATED WHITE BLOOD CELL COUNT, UNSPECIFIED   Status: 

Acute   Priority: High   Current Visit: Yes   


Qualifiers: 


   Leukocytosis type: unspecified   Qualified Code(s): D72.829 - Elevated white 

blood cell count, unspecified   





(5) Acute renal injury


SNOMED Code(s): 20138239, 37949536


   Code(s): N17.9 - ACUTE KIDNEY FAILURE, UNSPECIFIED   Status: Acute   Priority

: High   Current Visit: Yes   





(6) High anion gap metabolic acidosis


SNOMED Code(s): 44285282


   Code(s): E87.2 - ACIDOSIS   Status: Acute   Priority: High   Current Visit: 

Yes   





(7) History of venous thromboembolism


SNOMED Code(s): 595449201


   Code(s): Z86.718 - PERSONAL HISTORY OF OTHER VENOUS THROMBOSIS AND EMBOLISM 

  Status: Chronic   Priority: Medium   Current Visit: Yes   





(8) Chronic anticoagulation


SNOMED Code(s): 579449145


   Code(s): Z79.01 - LONG TERM (CURRENT) USE OF ANTICOAGULANTS   Status: 

Chronic   Priority: Low   Current Visit: Yes   





(9) HLD (hyperlipidemia)


SNOMED Code(s): 85353257


   Code(s): E78.5 - HYPERLIPIDEMIA, UNSPECIFIED   Status: Chronic   Priority: 

Low   Current Visit: No   


Qualifiers: 


   Hyperlipidemia type: unspecified   Qualified Code(s): E78.5 - Hyperlipidemia

, unspecified   





(10) GERD (gastroesophageal reflux disease)


SNOMED Code(s): 042524350


   Code(s): K21.9 - GASTRO-ESOPHAGEAL REFLUX DISEASE WITHOUT ESOPHAGITIS   

Status: Chronic   Priority: Medium   Current Visit: No   


Qualifiers: 


   Esophagitis presence: esophagitis presence not specified   Qualified Code(s)

: K21.9 - Gastro-esophageal reflux disease without esophagitis   





(11) Obesity


SNOMED Code(s): 055473544, 044633716


   Code(s): E66.9 - OBESITY, UNSPECIFIED   Status: Chronic   Priority: Medium   

Current Visit: No   


Qualifiers: 


   Obesity type: unspecified obesity type   Obesity classification: adult class 

2 (BMI 35 - 39.9)   Body mass index: BMI 38.0-38.9 





(12) Hematochezia


SNOMED Code(s): 620816235


   Code(s): K92.1 - MELENA   Status: Chronic   Priority: Medium   Current Visit

: Yes   





(13) Urinary tract infection


SNOMED Code(s): 64701121


   Code(s): N39.0 - URINARY TRACT INFECTION, SITE NOT SPECIFIED   Status: Acute

   Priority: Low   Current Visit: Yes   Onset Date: 03/31/20   


Qualifiers: 


   Urinary tract infection type: site unspecified   Hematuria presence: with 

hematuria   Qualified Code(s): N39.0 - Urinary tract infection, site not 

specified; R31.9 - Hematuria, unspecified   


Annotation/Comment:: klebsiella     treated   with  single  dose  rocephin  and

  not   continued .


  4/4/20 4/5/20


positive  campnocytophagia  aenerobic  organism   from  mouth or  g.i  tract. 

on  blood  culture  1/2.  will   reculture  and consider  emperic  antibiotic   

treatment    





(14) S/P laparotomy


SNOMED Code(s): 360465564, 473340309, 796187666


   Code(s): Z98.890 - OTHER SPECIFIED POSTPROCEDURAL STATES   Status: Acute   

Priority: Medium   Current Visit: Yes   Onset Date: 03/31/20   Annotation/

Comment:: ileus  not  resolving  and will  start  octreotide  and cont  reglan  

today .


4/4/20


 doing   well   tolerating  meds  but  little  improvemtn other than n.g  

output  less.


4/5/20


obstruction  not  resolving   but will increase  reglan and try  rectal  tube. 

decrease  tramidol


   





(15) Volume depletion


SNOMED Code(s): 328895779


   Code(s): E86.9 - VOLUME DEPLETION, UNSPECIFIED   Status: Acute   Current 

Visit: Yes   





(16) Chronic constipation


SNOMED Code(s): 520586352


   Code(s): K59.09 - OTHER CONSTIPATION   Status: Chronic   Priority: High   

Current Visit: Yes   





(17) Small bowel obstruction


SNOMED Code(s): 457439823


   Code(s): K56.609 - UNSP INTESTNL OBST, UNSP AS TO PARTIAL VERSUS COMPLETE 

OBST   Status: Acute   Priority: High   Current Visit: Yes   Onset Date: 03/31/ 20   Annotation/Comment:: discussed  with  herself and family and minimal  abd  

pain and cont   cons.  treatment add  octreotide.n.g   and  iv  both  decreased 

/  i/os  neg  1000    





(18) Ovarian cancer


SNOMED Code(s): 447153661


   Code(s): C56.9 - MALIGNANT NEOPLASM OF UNSPECIFIED OVARY   Status: Acute   

Priority: High   Current Visit: Yes   Onset Date: 03/31/20   


Qualifiers: 


   Laterality: unspecified laterality   Qualified Code(s): C56.9 - Malignant 

neoplasm of unspecified ovary   





(19) Need for comfort care


SNOMED Code(s): 298956045, 900934468


   Code(s): SRP0890 -    Status: Acute   Priority: High   Current Visit: Yes   





- Problem List Review


Problem List Initiated/Reviewed/Updated: Yes





- My Orders


Last 24 Hours: 


My Active Orders





04/07/20 14:43


Communication Order [RC] ASDIRECTED 





04/07/20 14:44


Communication Order [RC] ROUTINE 














- Assessment


Assessment:: 





4/3/2020


She slept well through the night


She is NPO


She has a NG tube placed


She is walking with a walker as tolerated x1 assist





Discussed that she is allowed to have ice chips


Explained octreotide to her since she was refusing because she didn't 

understand it


Discussed getting labs of CMP, Mag, and Phos4/4/20


 


afebrile  


vss


  ng output  250  cc  last  12  hours.


  no abd  pain  unless  moving and  achey .passing  little  gas .  no  b.m 


p.e.  unchanged  and  no  tenderness and  clear  greenish  fluid  in   n.g. 

tube .


lab  mild  elavation  of  lfts with  stable  creatinine. cbc  unchanged.


kub   persistant  ileus  partially  resolved.


no  air  fluid   levels .


 minimal   tenderness  or  none. 





 assess:


1) ileus  continues   but  slow  improvement over  6  days  of  n.g  suction.  

will clamp  tube  every  2  hours  and see if  she   tolerates.


 on  octratide and  reglan and  tolerating well.


2)ovarian  cancer  widely  metastatic and  patient  desires  to  go  home .  

home  suction  devices  being  sought   by  family .


 3) npo x  6  days and will need to  consider  tpn for   chronic  nutrition  

needs and   she is  discussing  that with family .  would  need a  gil cath  

placed and she is not  sure  she  wants to  do   that  or  tpn.  supportive  

care and  hospice  brought up as  options  as well .]\chemo therapy  options   

discussed over  phone  with  oncology and they  do not  think  she   can  

tolerate  but  would  see her  back  to  discuss in    further  detail .  no  

plans  for  chemo  emergantly  as  she  has   incision  to  heal up .


functionally  doing  well  and  incision  looks   good / 


 abd  pain  minmal and walking and control of  bowels and bladder  good. 


no signs  infection  or  cardiac  issues   but she is  obese. 


  





                                                                               

           boh 





- Plan


Plan:: 


4/3/2020


She slept well through the night


She is NPO


She has a NG tube placed


She is walking with a walker as tolerated x1 assist





Discussed that she is allowed to have ice chips


Explained octreotide to her since she was refusing because she didn't 

understand it


Discussed getting labs of CMP, Mag, and Phos4/4/20


 


afebrile  


vss


  ng output  250  cc  last  12  hours.


  no abd  pain  unless  moving and  achey .passing  little  gas .  no  b.m 


p.e.  unchanged  and  no  tenderness and  clear  greenish  fluid  in   n.g. 

tube .


lab  mild  elavation  of  lfts with  stable  creatinine. cbc  unchanged.


kub   persistant  ileus  partially  resolved.


no  air  fluid   levels .


 minimal   tenderness  or  none. 





 assess:


1) ileus  continues   but  slow  improvement over  6  days  of  n.g  suction.  

will clamp  tube  every  2  hours  and see if  she   tolerates.


 on  octratide and  reglan and  tolerating well.


2)ovarian  cancer  widely  metastatic and  patient  desires  to  go  home .  

home  suction  devices  being  sought   by  family .


 3) npo x  6  days and will need to  consider  tpn for   chronic  nutrition  

needs and   she is  discussing  that with family .  would  need a  gil cath  

placed and she is not  sure  she  wants to  do   that  or  tpn.  supportive  

care and  hospice  brought up as  options  as well .]\chemo therapy  options   

discussed over  phone  with  oncology and they  do not  think  she   can  

tolerate  but  would  see her  back  to  discuss in    further  detail .  no  

plans  for  chemo  emergantly  as  she  has   incision  to  heal up .


functionally  doing  well  and  incision  looks   good / 


 abd  pain  minmal and walking and control of  bowels and bladder  good. 


no signs  infection  or  cardiac  issues   but she is  obese. 


  


4/4/20


 


afebrile  


vss


  ng output  250  cc  last  12  hours.


  no abd  pain  unless  moving and  achey .passing  little  gas .  no  b.m 


p.e.  unchanged  and  no  tenderness and  clear  greenish  fluid  in   n.g. 

tube .


lab  mild  elavation  of  lfts with  stable  creatinine. cbc  unchanged.


kub   persistant  ileus  partially  resolved.


no  air  fluid   levels .


 minimal   tenderness  or  none. 





 assess:


1) ileus  continues   but  slow  improvement over  6  days  of  n.g  suction.  

will clamp  tube  every  2  hours  and see if  she   tolerates.


 on  octreotide and  reglan and  tolerating well.


2)ovarian  cancer  widely  metastatic and  patient  desires  to  go  home .  

home  suction  devices  being  sought   by  family .


 3) npo x  6  days and will need to  consider  tpn for   chronic  nutrition  

needs and   she is  discussing  that with family .  would  need a  gil cath  

placed and she is not  sure  she  wants to  do   that  or  tpn.  supportive  

care and  hospice  brought up as  options  as well .]\chemo therapy  options   

discussed over  phone  with  oncology and they  do not  think  she   can  

tolerate  but  would  see her  back  to  discuss in    further  detail .  no  

plans  for  chemo  emergantly  as  she  has   incision  to  heal up .


functionally  doing  well  and  incision  looks   good / 


 abd  pain  minmal and walking and control of  bowels and bladder  good. 


no signs  infection  or  cardiac  issues   but she is  obese.     boh 


  





4/5/20


  afebrile


vss/


vomiting  this  am  with  grossly  greenish chochlate colored  emisis.   

suction  turned  back on and  immediately  felt  better. 


on  octreotide and  reglan p.o . 


  n.g  output  550   before  emesis/  i/os   balanced .  


b.s  elavated  160.


p.e.


  unchanged  other  than  low  b.s  heard .   no  tenderness .


ambulating  well and will  try   to  maximize  reglan and trial of  rectal  

tube  discussed .  


she  agrees . 


taking   tramadol  p.o  and  known  to  slow   gastric  empting,


   but   sbo   obstruction   considered  main  problem.


pain  control  good  and  will  try  to  decrease  dose    further 





 lab  


  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   

culture.  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


 kleibsiella  in urine  treated with  rocephin x  one  dose  on  3/29 .





surgical  consult  recommends pic   line    instead  of  central line  at  this

  time.  oncology   o.p.  follow  up  with  detailed   discussion  about  chemo

  options  pending   resolution  of ileus . 





discussed  with  family  and  options  for  home   n.g .  suctioning  

intermittantly  not  known ,


 but   consulting  home  health and  family  willing    in  order to  get her  

home.    boh  





4/6/20


History of ovarian cancer with Mets 


On comfort care at this point


Vomiting resolved


NPO--> advanced to pleasure feeds 


Bowel sounds wax and wane 


Abdominal x-ray today shows no real improvement 


1100ml output through NG tube


Ambulating


Rectal tube placed and removed yesterday after patient discomfort


Home health unable to care for NG tube at home


[facility] reports no way for home suctioning


Family wants patient to return home, however after last night they admit they 

cannot possibly take care of her


Repeat blood cultures negative x1 day


Mental status improved 


Last BM was 2/22/20





Plan:


1) Continue NG tube


2) Discontinued lovenox


3) Pleasure feeds but caution with plugging NG tube


4) Consider PICC line/TNP/Lipids - family will discuss and get back to us


5) Stop abx while on comfort care


6) Resume octreotide 


7) Confirmed with patient and family comfort care status, No lab draws, No 

further surgical procedures, Meds to keep comfortable


8) Family and patient agree to explore placement. Will need ability for NG tube 

and possibly TPN/Lipids. SW aware and looking into options





4/7/20


Discussed plan with patient and family - wished for us to discuss with Dr. Yoo, Oncology with [facility] again


Dr. Yoo contacted. Agrees nothing more to offer patient. Believes risk 

outweigh benefits of chemotherapy. Recommends comfort care or Hospice.


Discussed TPN/Lipids with patient and family. All are in agreement they do not 

want at this point 


Absent bowel sounds


No nausea or vomiting


Continued normal mental status


No BMs or Flatus





Plan:


1) Continue NG tube to LIS


2) QShift vital signs


3) Continue Octreotide for secretion control


4) Home lozenges for throat irritation from NG tube


5) Family would like placement at [facility] -  working on this


6) Patient requesting martinez catheter remain for comfort care - Nursing OK to 

remove if patient changes mind


7) No parenteral feeding per patient


8) Discharge on comfort care once placement arranged





4/8/20 


Continues comfort care


Continues to request martinez remain in place


No new patient concerns


Due to COVID-19 restrictions, family is visiting with patient through the 

window. 


No nausea, vomiting, or pain





Plan:


1) Continue NG tube to LIS


2) Continue Qshift vital signs


3) Continue home lozenges for throat irritation


4) NPO although may have water and ice chips for comfort


5) Continue martinez catheter at patient request for comfort care


6) Accepted at [facility]


7) Discharge tomorrow (4/9/20) on comfort care.

## 2020-04-09 NOTE — [id number]
**Discharge Summary





- Hospital Course


HPI Initial Comments: 


Ninfa Carrillo is a 80 yo female who presents to ED on 3/26/2020 with nausea and 

vomiting.  She states that she feels like she is dehydrated.  She recently 

returned from [facility] in which she had a unsuccessful surgery to remove her 

uterus and colon lesions.  This reportedly occurred on March 18 and she was 

discharged on March 20.  Patient's daughter who accompanies her states that she 

had nausea and vomiting for couple days after surgery.  She had one good day 

and then her symptoms returned this past Sunday.  Reports has not been able to 

keep any medications food or liquid down.  She has history of significant 

nausea and vomiting with anesthesia however this is been lasting much longer 

than normal.  She has a prescription for p.o. Zofran which she took this morning

, does not really helped.  She reports additional increased weakness, dizziness

, lightheadedness, and lower abdominal pain at her incision site.  She reports 

she is had regular bowel movement since her surgery and states she does have 

blood in her stool.  She reports this is been an ongoing issue with her 

colorectal cancer but has not worsened in any way.  She denies any fever, chills

, or urinary issues.





In the ED temp was 97.9.  Pulse 88.  Respirations 15.  Blood pressure 163/82.  

Pulse ox 97%.  Labs are obtained with an elevated white count of 17.18.  

Hemoglobin is good at 13.4.  Hematocrit 42.6.  Platelets are high at 424,000.  

She is macrocytic.  Neutrophils are elevated at 13.48.  Anion gap is noted to 

be high at 17.8.  Creatinine is 1.3.  GFR is 39.  BUN is 27.  Glucose is normal 

at 103.  Troponin is negative.  Electrolytes otherwise look normal.  CRP is 

elevated at 5.3.  Albumin is low at 2.8.  UA is obtained and is strongly 

positive and concentrated.  2+ protein, 2+ ketones, 3+ occult blood, positive 

nitrate, 1+ bilirubin, 1+ leukocyte esterase, 50 RBCs, greater than 100 WBCs, 5-

10 squamous epithelial cells, and moderate bacteria are noted.  She is given a 

1 L fluid bolus and started on 1 g of Rocephin.  She is also given 20 mg of 

Pepcid and IV push Zofran for nausea.  Lactic acid is normal at 1.1.  Chest x-

ray is obtained and shows nothing acute.  Blood cultures were obtained and are 

pending.  Urine culture is pending.





She carries a history of: prior VTE, HLD, chronic constipation, GERD, obesity, 

colon and ovarian cancer, S/P oophorectomy in 2007.  She was never smoker.  Her 

PCP is Dr. Patel.  She subsequently admitted to the medical floor for 

management of her intractable nausea and vomiting and UTI.





Diagnosis: Stroke: No





- Discharge Data


Discharge Date: 04/09/20 (Admit date: 3/26/20)


Discharge Disposition: DC/Tfer W/I Hosp [facility]


Condition: Poor





- Referral to Home Health


Primary Care Physician: 


Eliana Patel MD








- Discharge Diagnosis/Problem(s)


(1) Nausea and vomiting


SNOMED Code(s): 53494054


   ICD Code: R11.2 - NAUSEA WITH VOMITING, UNSPECIFIED   Status: Resolved   

Priority: High   


Qualifiers: 


   Vomiting type: unspecified   Vomiting Intractability: intractable   

Qualified Code(s): R11.2 - Nausea with vomiting, unspecified   





(2) History of ovarian cancer


Status: Chronic   Priority: Medium   





(3) Colon cancer


SNOMED Code(s): 675339785


   ICD Code: C18.9 - MALIGNANT NEOPLASM OF COLON, UNSPECIFIED   Status: Acute   

Priority: High   


Qualifiers: 


   Colon location: unspecified part of colon   Qualified Code(s): C18.9 - 

Malignant neoplasm of colon, unspecified   





(4) Leukocytosis


SNOMED Code(s): 654075021, 280821731


   ICD Code: D72.829 - ELEVATED WHITE BLOOD CELL COUNT, UNSPECIFIED   Status: 

Acute   Priority: High   


Qualifiers: 


   Leukocytosis type: unspecified   Qualified Code(s): D72.829 - Elevated white 

blood cell count, unspecified   





(5) Acute renal injury


SNOMED Code(s): 57462375, 35049596


   ICD Code: N17.9 - ACUTE KIDNEY FAILURE, UNSPECIFIED   Status: Acute   

Priority: High   





(6) High anion gap metabolic acidosis


SNOMED Code(s): 50959994


   ICD Code: E87.2 - ACIDOSIS   Status: Acute   Priority: High   





(7) History of venous thromboembolism


SNOMED Code(s): 630333400


   ICD Code: Z86.718 - PERSONAL HISTORY OF OTHER VENOUS THROMBOSIS AND EMBOLISM

   Status: Chronic   Priority: Medium   





(8) Chronic anticoagulation


SNOMED Code(s): 300352301


   ICD Code: Z79.01 - LONG TERM (CURRENT) USE OF ANTICOAGULANTS   Status: 

Chronic   Priority: Low   





(9) HLD (hyperlipidemia)


SNOMED Code(s): 91024377


   ICD Code: E78.5 - HYPERLIPIDEMIA, UNSPECIFIED   Status: Chronic   Priority: 

Low   


Qualifiers: 


   Hyperlipidemia type: unspecified   Qualified Code(s): E78.5 - Hyperlipidemia

, unspecified   





(10) GERD (gastroesophageal reflux disease)


SNOMED Code(s): 702747791


   ICD Code: K21.9 - GASTRO-ESOPHAGEAL REFLUX DISEASE WITHOUT ESOPHAGITIS   

Status: Chronic   Priority: Medium   


Qualifiers: 


   Esophagitis presence: esophagitis presence not specified   Qualified Code(s)

: K21.9 - Gastro-esophageal reflux disease without esophagitis   





(11) Obesity


SNOMED Code(s): 848428009, 283622372


   ICD Code: E66.9 - OBESITY, UNSPECIFIED   Status: Chronic   Priority: Medium 

  


Qualifiers: 


   Obesity type: unspecified obesity type   Obesity classification: adult class 

2 (BMI 35 - 39.9)   Body mass index: BMI 38.0-38.9 





(12) Hematochezia


SNOMED Code(s): 129680575


   ICD Code: K92.1 - MELENA   Status: Chronic   Priority: Medium   





(13) Urinary tract infection


SNOMED Code(s): 84431867


   ICD Code: N39.0 - URINARY TRACT INFECTION, SITE NOT SPECIFIED   Status: 

Acute   Priority: Low   Onset Date: 03/31/20   Problem Details: klebsiella     

treated   with  single  dose  rocephin  and  not   continued .


  4/4/20 4/5/20


positive  campnocytophagia  aenerobic  organism   from  mouth or  g.i  tract. 

on  blood  culture  1/2.  will   reculture  and consider  emperic  antibiotic   

treatment    


Qualifiers: 


   Urinary tract infection type: site unspecified   Hematuria presence: with 

hematuria   Qualified Code(s): N39.0 - Urinary tract infection, site not 

specified; R31.9 - Hematuria, unspecified   





(14) S/P laparotomy


SNOMED Code(s): 096304657, 627323930, 099906724


   ICD Code: Z98.890 - OTHER SPECIFIED POSTPROCEDURAL STATES   Status: Acute   

Priority: Medium   Onset Date: 03/31/20   Problem Details: ileus  not  

resolving  and will  start  octreotide  and cont  reglan  today .


4/4/20


 doing   well   tolerating  meds  but  little  improvemtn other than n.g  

output  less.


4/5/20


obstruction  not  resolving   but will increase  reglan and try  rectal  tube. 

decrease  tramidol


   





(15) Volume depletion


SNOMED Code(s): 959202450


   ICD Code: E86.9 - VOLUME DEPLETION, UNSPECIFIED   Status: Acute   





(16) Chronic constipation


SNOMED Code(s): 454613971


   ICD Code: K59.09 - OTHER CONSTIPATION   Status: Chronic   Priority: High   





(17) Small bowel obstruction


SNOMED Code(s): 637217974


   ICD Code: K56.609 - UNSP INTESTNL OBST, UNSP AS TO PARTIAL VERSUS COMPLETE 

OBST   Status: Acute   Priority: High   Onset Date: 03/31/20   Problem Details: 

discussed  with  herself and family and minimal  abd  pain and cont   cons.  

treatment add  octreotide.n.g   and  iv  both  decreased /  i/os  neg  1000    





(18) Ovarian cancer


SNOMED Code(s): 653920647


   ICD Code: C56.9 - MALIGNANT NEOPLASM OF UNSPECIFIED OVARY   Status: Acute   

Priority: High   Onset Date: 03/31/20   


Qualifiers: 


   Laterality: unspecified laterality   Qualified Code(s): C56.9 - Malignant 

neoplasm of unspecified ovary   





(19) Need for comfort care


SNOMED Code(s): 486935881, 475754762


   ICD Code: IFD5993 -    Status: Acute   Priority: High   





- Patient Summary/Data


Consults: 


 Consultations





03/26/20 16:14


Consult to Case Management/ [CONS] Routine 


Consult to Spiritual Care [CONS] Routine 











Labs Pending at D/C: 


None


Recommended Follow-up Testing/Procedures: 


Follow-up as needed for comfort care 





Hospital Course: 


Ninfa was admitted to the hospital floor due to intractable nausea and 

vomiting believed to be secondary to UTI.  As noted in the initial H&P, she had 

just returned from [facility] after undergoing a laparotomy.  Plan was to 

perform a bowel resection due to a tumor, however once they were inside the 

cancer was noted to be quite significant and incision was closed after biopsies 

were obtained.  Reports are that this was a ovarian cancer, for which she has a 

history of and underwent a oophorectomy many years prior.  Plan at that time 

was for the patient to return home and undergo chemotherapy.  In the hospital 

blood cultures grew out Klebsiella. She was started on IV Rocephin, 1gm. One 

out of 3 blood culture bottles grew out Capnoctyophaga gingivalis.  After 

admission patient's abdomen was noted to be somewhat tender and she did undergo 

another episode of significant nausea and vomiting.  Abdominal x-ray was 

obtained showing a small bowel obstruction.  Patient's oncologist, Dr. Yoo 

was contacted who stated that if this is in fact a small bowel obstruction due 

to the cancer progress that prognosis would be very poor.  He recommended 

standard management and to follow-up with him.  NG tube was placed and applied 

to low intermittent suction.  Patient attempted to ambulate multiple times.  

General surgery was brought on board and offered a venting gastrostomy, however 

the patient and family ultimately refused.  TPN and lipids were discussed and 

the patient ultimately refused these as well.  Unfortunately with treatment 

patient's symptoms did not resolve or improve.  Bowel sounds remained active at 

times, however they were also more often absent. Dr. Yoo was again 

contacted at the request of the family who agreed that there is nothing more to 

offer the patient.  He recommended patient be placed on hospice care.  

Initially family was hoping to take patient home however due to need for 

continued NG tube this was not a option.  No local nursing facilities were 

equipped to handle the NG tube.  Patient agreed to continue NG tube, as without 

it she would have significant nausea and vomiting.  Patient requested Martinez 

catheter remain in place for comfort care.  All treatment options were 

discontinued by the patient.  She ultimately was accepted at [facility] after discussion with the family.  She was discharged today and transferred 

there by [facility].








- Patient Instructions


Diet: NPO


Diet, Other: Ice chips and water PRN for comfort care 


Activity: As Tolerated, Bedrest


Other/Special Instructions: Discharged on comfort care.  Continue NG tube with 

LIS.





- Discharge Plan


*PRESCRIPTION DRUG MONITORING PROGRAM REVIEWED*: No


*COPY OF PRESCRIPTION DRUG MONITORING REPORT IN PATIENT FRANKY: No


Home Medications: 


 Home Meds





Morphine 2 mg IVPUSH Q30M PRN #0 syringe 04/09/20 [Rx]


Octreotide 100 mcg SUBCUT QID  syringe 04/09/20 [Rx]


Scopolamine [Transderm-Scop] 1.5 mg TRDERM Q72H  patch 04/09/20 [Rx]








Oxygen Therapy Mode: Nasal Cannula


Oxygen Flow Rate (L/min): 2


Maintain SpO2% greater than: 90 (for comfort )


Patient Handouts:  Ovarian Cancer, Sepsis, Diagnosis, Adult


Referrals: 


Jorge,Eliana W, MD [Primary Care Provider] - 





- Discharge Summary/Plan Comment


DC Time >30 min.: Yes (60 minutes )





- General Info


Date of Service: 04/09/20


Admission Dx/Problem (Free Text: 


 Admission Diagnosis/Problem





Admission Diagnosis/Problem      Urinary tract infection





79 year old female admitted on 3/26/2020 with complaints of abdominal pain 

after she was hospitalized and had surgery to remove her uterus and colon 

lesions at [facility] on 3/18/2020


Functional Status: Reports: Pain Controlled, Urinating (martinez in place ).  

Denies: Tolerating Diet (NPO), Ambulating, New Symptoms





- Review of Systems


General: Reports: Weakness, Fatigue, Malaise.  Denies: Fever, Chills


HEENT: Reports: Sore Throat (2/2 NG tube ).  Denies: Headaches


Pulmonary: Reports: No Symptoms.  Denies: Shortness of Breath, Pleuritic Chest 

Pain, Cough, Sputum


Cardiovascular: Reports: No Symptoms.  Denies: Chest Pain, Palpitations, 

Dyspnea on Exertion


Gastrointestinal: Reports: No Symptoms.  Denies: Abdominal Pain, Constipation, 

Diarrhea, Nausea, Vomiting


Genitourinary: Reports: No Symptoms.  Denies: Pain


Musculoskeletal: Reports: No Symptoms


Skin: Reports: No Symptoms.  Denies: Cyanosis


Neurological: Reports: No Symptoms.  Denies: Confusion


Psychiatric: Reports: No Symptoms





- Patient Data


Vitals - Most Recent: 


 Last Vital Signs











Temp  97.3 F   04/09/20 07:52


 


Pulse  84   04/09/20 07:52


 


Resp  16   04/09/20 07:52


 


BP  114/55 L  04/09/20 07:52


 


Pulse Ox  93 L  04/09/20 08:25











Weight - Most Recent: 248 lb 3.2 oz


I&O - Last 24 hours: 


 Intake & Output











 04/08/20 04/09/20 04/09/20





 22:59 06:59 14:59


 


Intake Total 270 290 


 


Output Total 700 1000 


 


Balance -430 -710 











MO Results - Last 24 hrs: 


 Microbiology











 04/05/20 13:48 Aerobic Blood Culture - Preliminary





 Blood - Venous - Lab Draw    NO GROWTH AFTER 4 DAYS





 Anaerobic Blood Culture - Preliminary





    NO GROWTH AFTER 4 DAYS


 


 04/05/20 14:03 Aerobic Blood Culture - Preliminary





 Blood - Venous    NO GROWTH AFTER 4 DAYS





 Anaerobic Blood Culture - Preliminary





    NO GROWTH AFTER 4 DAYS











Med Orders - Current: 


 Current Medications








Discontinued Medications





Acetaminophen (Tylenol)  650 mg PO Q4H PRN


   PRN Reason: Pain (Mild 1-3)/fever


   Last Admin: 03/28/20 05:07 Dose:  650 mg


Acetaminophen (Tylenol)  650 mg PO Q4H PRN


   PRN Reason: Pain/Fever


Al Hydroxide/Mg Hydroxide (Mag-Al Plus)  30 ml PO ONETIME ONE


   Stop: 03/26/20 13:42


   Last Admin: 03/26/20 13:47 Dose:  30 ml


Albuterol (Proventil Neb Soln)  2.5 mg NEB Q15M PRN


   PRN Reason: Shortness of Breath


Apixaban (Eliquis)  5 mg PO BID [facility]


   Last Admin: 03/29/20 08:45 Dose:  5 mg


Artificial Tears (Refresh Liquigel 1%)  1 - 2 ml EYEBOTH QID PRN; Protocol


   PRN Reason: Dry Eyes


Benzocaine (Hurricaine 20% Spray)  0 ml MUCMEM Q2H PRN


   PRN Reason: Sore Throat


   Last Admin: 03/31/20 15:19 Dose:  1 spray


Benzocaine/Menthol (Cepacol Sore Throat)  1 lozenge MUCMEM Q2HR PRN


   PRN Reason: Sore Throat


Benzocaine/Menthol (Cepacol Sore Throat)  1 lozenge MUCMEM Q2H PRN


   PRN Reason: Sore throat 


Bisacodyl (Dulcolax)  10 mg RECTAL ONETIME ONE


   Stop: 04/01/20 11:55


   Last Admin: 04/01/20 16:35 Dose:  Not Given


Bisacodyl (Dulcolax)  10 mg RECTAL ONETIME ONE


   Stop: 04/02/20 08:18


   Last Admin: 04/02/20 09:58 Dose:  10 mg


Calcium Polycarbophil (Fibercon)  1,250 mg PO DAILY [facility]


   Last Admin: 03/30/20 11:01 Dose:  Not Given


Cefdinir (Omnicef)  300 mg PO BID [facility]


   Last Admin: 03/29/20 08:45 Dose:  300 mg


Dexamethasone (Dexamethasone)  2 mg IVPUSH ONETIME ONE


   Stop: 04/05/20 21:10


   Last Admin: 04/06/20 07:45 Dose:  Not Given


Dextrose/Water (Dextrose 50% In Water)  50 ml IVPUSH ASDIRECTED PRN


   PRN Reason: Hypoglycemia


Enoxaparin Sodium (Lovenox)  110 mg SUBCUT Q12H [facility]


   Last Admin: 04/05/20 20:47 Dose:  110 mg


Famotidine (Pepcid)  20 mg IVPUSH ONETIME ONE


   Stop: 03/26/20 14:33


   Last Admin: 03/26/20 14:38 Dose:  20 mg


Famotidine (Pepcid)  20 mg PO DAILY [facility]


   Last Admin: 03/28/20 08:14 Dose:  20 mg


Famotidine (Pepcid)  20 mg PO BID [facility]


   Last Admin: 03/29/20 08:45 Dose:  20 mg


Famotidine (Pepcid)  20 mg IVPUSH DAILY [facility]


Famotidine (Pepcid)  20 mg IVPUSH DAILY [facility]


   Last Admin: 04/03/20 08:49 Dose:  20 mg


Furosemide (Lasix)  20 mg IVPUSH NOW ONE


   Stop: 04/02/20 08:03


   Last Admin: 04/02/20 08:34 Dose:  10 mg


Furosemide (Lasix)  40 mg IVPUSH NOW ONE


   Stop: 04/03/20 10:16


   Last Admin: 04/03/20 10:38 Dose:  40 mg


Haloperidol (Haldol)  1 mg PO Q1H PRN


   PRN Reason: delirium or restlessness


Haloperidol (Haldol)  1 mg PO Q1H PRN


   PRN Reason: delirium or restlessness


Haloperidol Lactate (Haldol)  1 mg IVPUSH Q1H PRN


   PRN Reason: delirium or restlessness


   Last Admin: 04/09/20 03:20 Dose:  1 mg


Sodium Chloride (Normal Saline)  1,000 mls @ 999 mls/hr IV ASDIRECTED [facility]


   Last Admin: 03/26/20 12:18 Dose:  150 mls/hr


Ceftriaxone Sodium 1 gm/ (Sodium Chloride)  100 mls @ 200 mls/hr IV Q24H [facility]


   Last Admin: 03/27/20 14:58 Dose:  200 mls/hr


Sodium Chloride (Normal Saline)  1,000 mls @ 75 mls/hr IV ASDIRECTED [facility]


   Stop: 03/27/20 05:49


   Last Admin: 03/26/20 16:47 Dose:  75 mls/hr


Promethazine HCl 25 mg/ Sodium (Chloride)  51 mls @ 100 mls/hr IV ONETIME ONE


   Stop: 03/26/20 18:09


   Last Admin: 03/26/20 18:32 Dose:  100 mls/hr


Promethazine HCl 25 mg/ Sodium (Chloride)  51 mls @ 100 mls/hr IV Q6H PRN


   PRN Reason: Nausea/Vomiting


Sodium Chloride (Normal Saline)  1,000 mls @ 100 mls/hr IV ASDIRECTED [facility]


   Stop: 03/28/20 17:29


   Last Admin: 03/27/20 17:44 Dose:  100 mls/hr


Dextrose/Lactated Ringer's (Dextrose 5%-Lactated Ringers)  1,000 mls @ 150 mls/

hr IV ASDIRECTED [facility]


   Last Admin: 03/31/20 07:30 Dose:  150 mls/hr


Ceftriaxone Sodium 1 gm/ (Sodium Chloride)  100 mls @ 200 mls/hr IV Q24H [facility]


   Last Admin: 03/30/20 11:40 Dose:  200 mls/hr


Lactated Ringer's (Ringers, Lactated)  1,000 mls @ 150 mls/hr IV ASDIRECTED [facility]


   Last Admin: 03/31/20 17:32 Dose:  150 mls/hr


Dextrose/Lactated Ringer's (Dextrose 5%-Lactated Ringers)  1,000 mls @ 150 mls/

hr IV ASDIRECTED [facility]


   Last Admin: 04/01/20 20:52 Dose:  150 mls/hr


Dextrose/Lactated Ringer's (Dextrose 5%-Lactated Ringers)  1,000 mls @ 50 mls/

hr IV ASDIRECTED [facility]


   Last Admin: 04/04/20 02:56 Dose:  50 mls/hr


Sodium Chloride 38.4 meq/ (Dextrose/Water)  509.6 mls @ 80 mls/hr IV ASDIRECTED 

[facility]


Sodium Chloride 76.8 meq/Potassium Chloride 20 meq/Dextrose/Water  1,029.2 mls 

@ 100 mls/hr IV ASDIRECTED [facility]


Sodium Chloride 76.8 meq/Potassium Chloride 20 meq/Dextrose/Water  1,029.2 mls 

@ 100 mls/hr IV .[id number] [facility]


   Last Admin: 04/05/20 10:27 Dose:  100 mls/hr


Ceftriaxone Sodium 2 gm/ (Sodium Chloride)  100 mls @ 200 mls/hr IV Q24H [facility]


   Last Admin: 04/05/20 15:04 Dose:  Not Given


Ceftriaxone Sodium 2 gm/ (Sodium Chloride)  100 mls @ 200 mls/hr IV Q24H [facility]


   Last Admin: 04/05/20 14:53 Dose:  200 mls/hr


Promethazine HCl 50 mg/ Sodium (Chloride)  52 mls @ 100 mls/hr IV Q6H [facility]


   Stop: 04/06/20 16:45


   Last Admin: 04/05/20 17:04 Dose:  100 mls/hr


Dextrose/Sodium Chloride (Dextrose 5%-1/2 Ns)  1,000 mls @ 150 mls/hr IV 

ASDIRECTED [facility]


   Last Admin: 04/06/20 08:58 Dose:  150 mls/hr


Promethazine HCl 12.5 mg/ (Sodium Chloride)  50.5 mls @ 100 mls/hr IV Q4H PRN


   PRN Reason: Nausea


Ceftriaxone Sodium 2 gm/ (Sodium Chloride)  100 mls @ 200 mls/hr IV Q24H [facility]


Insulin Glargine (Lantus)  25 unit SUBCUT DAILY [facility]


   Last Admin: 04/05/20 12:00 Dose:  25 units


Lorazepam (Ativan)  0.5 mg IVPUSH ONETIME ONE


   Stop: 04/05/20 18:04


   Last Admin: 04/05/20 18:00 Dose:  Not Given


Lorazepam (Ativan)  0.5 mg IVPUSH ONETIME ONE


   Stop: 04/05/20 20:01


   Last Admin: 04/05/20 20:50 Dose:  0.5 mg


Lorazepam (Ativan)  0.5 mg IVPUSH Q4H PRN


   PRN Reason: restlessness


Lorazepam (Ativan)  1 mg SUBCUT Q30M PRN


   PRN Reason: Anxiety


Lorazepam (Ativan)  1 mg IVPUSH Q15M PRN


   PRN Reason: Anxiety


Lorazepam (Ativan)  1 mg PO Q30M PRN


   PRN Reason: Anxiety


Magnesium Hydroxide (Milk Of Magnesia)  30 ml PO BID PRN


   PRN Reason: Constipation


   Last Admin: 03/29/20 05:17 Dose:  30 ml


Melatonin (Melatonin)  6 mg PO BEDTIME PRN


   PRN Reason: Sleep


   Last Admin: 04/04/20 20:38 Dose:  6 mg


Metoclopramide HCl (Reglan)  10 mg IVPUSH Q6H [facility]


   Stop: 04/01/20 21:16


   Last Admin: 04/01/20 12:12 Dose:  Not Given


Metoclopramide HCl (Reglan)  10 mg IVPUSH Q6H [facility]


   Stop: 04/01/20 23:01


   Last Admin: 04/01/20 23:15 Dose:  10 mg


Metoclopramide HCl (Reglan)  10 mg PO Q8H [facility]


   Last Admin: 04/05/20 14:52 Dose:  10 mg


Metoclopramide HCl (Reglan)  10 mg PO Q6H PRN


   PRN Reason: Nausea


Metoclopramide HCl (Reglan)  10 mg IVPUSH Q6H PRN


   PRN Reason: Nausea


Miscellaneous Information (Remove Patch)  1 ea TRDERM Q72H [facility]


   Last Admin: 04/07/20 20:53 Dose:  Not Given


Morphine Sulfate (Morphine)  2 mg IVPUSH Q4H PRN


   PRN Reason: Pain


   Last Admin: 04/06/20 04:17 Dose:  2 mg


Morphine Sulfate (Morphine)  2 mg SUBCUT Q30M PRN


   PRN Reason: Pain or Shortness of breath


   Last Admin: 04/09/20 01:10 Dose:  2 mg


Morphine Sulfate (Morphine)  2 mg IVPUSH Q30M PRN


   PRN Reason: Pain or Shortness of breath


   Last Admin: 04/09/20 11:00 Dose:  2 mg


Multivitamins (Thera)  1 each PO DAILY [facility]


   Last Admin: 03/29/20 08:46 Dose:  Not Given


Octreotide Acetate (Octreotide)  100 mcg SUBCUT TID [facility]


   Last Admin: 04/05/20 08:24 Dose:  100 mcg


Octreotide Acetate (Octreotide)  100 mcg SUBCUT QID [facility]


   Last Admin: 04/05/20 20:49 Dose:  100 mcg


Octreotide Acetate (Octreotide)  100 mcg SUBCUT QID [facility]


   Last Admin: 04/09/20 09:17 Dose:  100 mcg


Ondansetron HCl (Zofran)  4 mg IVPUSH ONETIME ONE


   Stop: 03/26/20 12:09


   Last Admin: 03/26/20 12:18 Dose:  4 mg


Ondansetron HCl (Zofran)  4 mg IVPUSH ONETIME ONE


   Stop: 03/26/20 14:31


   Last Admin: 03/26/20 14:38 Dose:  4 mg


Ondansetron HCl (Zofran)  4 mg IV Q6H PRN


   PRN Reason: Nausea/Vomiting


   Last Admin: 04/05/20 12:10 Dose:  4 mg


Ondansetron HCl (Zofran Odt)  4 mg PO Q12H [facility]


   Last Admin: 03/29/20 00:47 Dose:  4 mg


Ondansetron HCl (Zofran)  4 mg IVPUSH ONETIME ONE


   Stop: 04/05/20 15:00


   Last Admin: 04/05/20 15:13 Dose:  4 mg


Pharmacy Consult (Consult To Pharmacy)  1 each .XX ASDIRECTED [facility]


Scopolamine (Transderm-Scop)  1.5 mg TRDERM Q72H [facility]


   Last Admin: 04/07/20 20:52 Dose:  1.5 mg


Senna/Docusate Sodium (Senna Plus)  1 tab PO BID PRN


   PRN Reason: Constipation


   Last Admin: 03/28/20 05:07 Dose:  1 tab


Senna/Docusate Sodium (Senna Plus)  1 tab PO BID [facility]


   Last Admin: 04/06/20 07:44 Dose:  Not Given


Simethicone (Simethicone)  80 mg PO QID PRN


   PRN Reason: bloating 


   Last Admin: 03/31/20 20:54 Dose:  80 mg


Sodium Chloride (Saline Flush)  10 ml FLUSH ASDIRECTED PRN


   PRN Reason: Keep Vein Open


   Last Admin: 03/26/20 12:18 Dose:  10 ml


Tramadol HCl (Ultram)  50 mg PO Q6H PRN


   PRN Reason: Pain


   Last Admin: 04/04/20 20:38 Dose:  50 mg











- Exam


Quality Assessment: Reports: Supplemental Oxygen, Urine Catheter, DVT 

Prophylaxis


General: Reports: Alert, Oriented, Cooperative, No Acute Distress


HEENT: Reports: Pupils Equal, Pupils Reactive, Mucous Membr. Moist/Pink


Neck: Reports: Supple, Trachea Midline


Lungs: Reports: Clear to Auscultation, Normal Respiratory Effort


Cardiovascular: Reports: Regular Rate, Regular Rhythm


GI/Abdominal Exam: Soft, Non-Tender, No Distention, Abnormal Bowel Sounds (

absent )


 (Female) Exam: Deferred


Rectal (Female) Exam: Deferred


Extremities: Normal Inspection, Normal Range of Motion, Non-Tender, Normal 

Capillary Refill


Skin: Reports: Warm, Dry, Intact


Wound/Incisions: Reports: Healing Well.  Denies: Erythema


Neurological: Reports: No New Focal Deficit


Psy/Mental Status: Reports: Alert